# Patient Record
Sex: FEMALE | Race: WHITE | NOT HISPANIC OR LATINO | Employment: FULL TIME | URBAN - METROPOLITAN AREA
[De-identification: names, ages, dates, MRNs, and addresses within clinical notes are randomized per-mention and may not be internally consistent; named-entity substitution may affect disease eponyms.]

---

## 2017-03-30 ENCOUNTER — HOSPITAL ENCOUNTER (OUTPATIENT)
Dept: LAB | Facility: MEDICAL CENTER | Age: 47
End: 2017-03-30
Attending: FAMILY MEDICINE
Payer: COMMERCIAL

## 2017-03-30 LAB
25(OH)D3 SERPL-MCNC: 11 NG/ML (ref 30–100)
ALBUMIN SERPL BCP-MCNC: 3.9 G/DL (ref 3.2–4.9)
ALBUMIN/GLOB SERPL: 1.3 G/DL
ALP SERPL-CCNC: 73 U/L (ref 30–99)
ALT SERPL-CCNC: 22 U/L (ref 2–50)
ANION GAP SERPL CALC-SCNC: 10 MMOL/L (ref 0–11.9)
AST SERPL-CCNC: 19 U/L (ref 12–45)
BASOPHILS # BLD AUTO: 0.03 K/UL (ref 0–0.12)
BASOPHILS NFR BLD AUTO: 0.3 % (ref 0–1.8)
BILIRUB SERPL-MCNC: 0.6 MG/DL (ref 0.1–1.5)
BUN SERPL-MCNC: 11 MG/DL (ref 8–22)
CALCIUM SERPL-MCNC: 8.9 MG/DL (ref 8.5–10.5)
CHLORIDE SERPL-SCNC: 104 MMOL/L (ref 96–112)
CHOLEST SERPL-MCNC: 186 MG/DL (ref 100–199)
CO2 SERPL-SCNC: 24 MMOL/L (ref 20–33)
CREAT SERPL-MCNC: 0.86 MG/DL (ref 0.5–1.4)
EOSINOPHIL # BLD: 0.01 K/UL (ref 0–0.51)
EOSINOPHIL NFR BLD AUTO: 0.1 % (ref 0–6.9)
ERYTHROCYTE [DISTWIDTH] IN BLOOD BY AUTOMATED COUNT: 40.6 FL (ref 35.9–50)
GLOBULIN SER CALC-MCNC: 3 G/DL (ref 1.9–3.5)
GLUCOSE SERPL-MCNC: 79 MG/DL (ref 65–99)
HCT VFR BLD AUTO: 42.5 % (ref 37–47)
HDLC SERPL-MCNC: 43 MG/DL
HGB BLD-MCNC: 13.6 G/DL (ref 12–16)
IMM GRANULOCYTES # BLD AUTO: 0.04 K/UL (ref 0–0.11)
IMM GRANULOCYTES NFR BLD AUTO: 0.4 % (ref 0–0.9)
LDLC SERPL CALC-MCNC: 111 MG/DL
LYMPHOCYTES # BLD: 3.57 K/UL (ref 1–4.8)
LYMPHOCYTES NFR BLD AUTO: 36.6 % (ref 22–41)
MCH RBC QN AUTO: 26.9 PG (ref 27–33)
MCHC RBC AUTO-ENTMCNC: 32 G/DL (ref 33.6–35)
MCV RBC AUTO: 84.2 FL (ref 81.4–97.8)
MONOCYTES # BLD: 0.5 K/UL (ref 0–0.85)
MONOCYTES NFR BLD AUTO: 5.1 % (ref 0–13.4)
NEUTROPHILS # BLD: 5.6 K/UL (ref 2–7.15)
NEUTROPHILS NFR BLD AUTO: 57.5 % (ref 44–72)
NRBC # BLD AUTO: 0 K/UL
NRBC BLD-RTO: 0 /100 WBC
PLATELET # BLD AUTO: 293 K/UL (ref 164–446)
PMV BLD AUTO: 9.5 FL (ref 9–12.9)
POTASSIUM SERPL-SCNC: 3.9 MMOL/L (ref 3.6–5.5)
PROT SERPL-MCNC: 6.9 G/DL (ref 6–8.2)
RBC # BLD AUTO: 5.05 M/UL (ref 4.2–5.4)
SODIUM SERPL-SCNC: 138 MMOL/L (ref 135–145)
TRIGL SERPL-MCNC: 159 MG/DL (ref 0–149)
TSH SERPL DL<=0.005 MIU/L-ACNC: 1.04 UIU/ML (ref 0.3–3.7)
WBC # BLD AUTO: 9.8 K/UL (ref 4.8–10.8)

## 2017-03-30 PROCEDURE — 80061 LIPID PANEL: CPT

## 2017-03-30 PROCEDURE — 85025 COMPLETE CBC W/AUTO DIFF WBC: CPT

## 2017-03-30 PROCEDURE — 82306 VITAMIN D 25 HYDROXY: CPT

## 2017-03-30 PROCEDURE — 36415 COLL VENOUS BLD VENIPUNCTURE: CPT

## 2017-03-30 PROCEDURE — 84443 ASSAY THYROID STIM HORMONE: CPT

## 2017-03-30 PROCEDURE — 80053 COMPREHEN METABOLIC PANEL: CPT

## 2017-04-03 ENCOUNTER — HOSPITAL ENCOUNTER (OUTPATIENT)
Dept: RADIOLOGY | Facility: MEDICAL CENTER | Age: 47
End: 2017-04-03
Attending: FAMILY MEDICINE
Payer: COMMERCIAL

## 2017-04-03 DIAGNOSIS — Z12.39 SCREENING BREAST EXAMINATION: ICD-10-CM

## 2017-04-03 PROCEDURE — 77063 BREAST TOMOSYNTHESIS BI: CPT

## 2017-04-05 ENCOUNTER — HOSPITAL ENCOUNTER (OUTPATIENT)
Dept: RADIOLOGY | Facility: MEDICAL CENTER | Age: 47
End: 2017-04-05

## 2017-04-18 ENCOUNTER — HOSPITAL ENCOUNTER (OUTPATIENT)
Dept: RADIOLOGY | Facility: MEDICAL CENTER | Age: 47
End: 2017-04-18

## 2017-11-26 ENCOUNTER — APPOINTMENT (OUTPATIENT)
Dept: RADIOLOGY | Facility: MEDICAL CENTER | Age: 47
End: 2017-11-26
Attending: EMERGENCY MEDICINE
Payer: COMMERCIAL

## 2017-11-26 ENCOUNTER — HOSPITAL ENCOUNTER (EMERGENCY)
Facility: MEDICAL CENTER | Age: 47
End: 2017-11-26
Attending: EMERGENCY MEDICINE
Payer: COMMERCIAL

## 2017-11-26 VITALS
TEMPERATURE: 96.6 F | WEIGHT: 241.18 LBS | HEART RATE: 61 BPM | RESPIRATION RATE: 16 BRPM | OXYGEN SATURATION: 97 % | SYSTOLIC BLOOD PRESSURE: 115 MMHG | BODY MASS INDEX: 40.18 KG/M2 | HEIGHT: 65 IN | DIASTOLIC BLOOD PRESSURE: 60 MMHG

## 2017-11-26 DIAGNOSIS — S09.90XA CLOSED HEAD INJURY, INITIAL ENCOUNTER: ICD-10-CM

## 2017-11-26 PROCEDURE — 70450 CT HEAD/BRAIN W/O DYE: CPT

## 2017-11-26 PROCEDURE — 90471 IMMUNIZATION ADMIN: CPT

## 2017-11-26 PROCEDURE — 90715 TDAP VACCINE 7 YRS/> IM: CPT | Performed by: EMERGENCY MEDICINE

## 2017-11-26 PROCEDURE — A9270 NON-COVERED ITEM OR SERVICE: HCPCS | Performed by: EMERGENCY MEDICINE

## 2017-11-26 PROCEDURE — 700102 HCHG RX REV CODE 250 W/ 637 OVERRIDE(OP): Performed by: EMERGENCY MEDICINE

## 2017-11-26 PROCEDURE — 304217 HCHG IRRIGATION SYSTEM

## 2017-11-26 PROCEDURE — 304999 HCHG REPAIR-SIMPLE/INTERMED LEVEL 1

## 2017-11-26 PROCEDURE — 72125 CT NECK SPINE W/O DYE: CPT

## 2017-11-26 PROCEDURE — 305308 HCHG STAPLER,SKIN,DISP.

## 2017-11-26 PROCEDURE — 700111 HCHG RX REV CODE 636 W/ 250 OVERRIDE (IP): Performed by: EMERGENCY MEDICINE

## 2017-11-26 PROCEDURE — 99283 EMERGENCY DEPT VISIT LOW MDM: CPT

## 2017-11-26 RX ORDER — ONDANSETRON 4 MG/1
4 TABLET, ORALLY DISINTEGRATING ORAL ONCE
Status: COMPLETED | OUTPATIENT
Start: 2017-11-26 | End: 2017-11-26

## 2017-11-26 RX ORDER — FLUOXETINE HYDROCHLORIDE 40 MG/1
40 CAPSULE ORAL DAILY
Status: SHIPPED | COMMUNITY
End: 2018-12-26

## 2017-11-26 RX ORDER — HYDROCODONE BITARTRATE AND ACETAMINOPHEN 5; 325 MG/1; MG/1
1 TABLET ORAL ONCE
Status: COMPLETED | OUTPATIENT
Start: 2017-11-26 | End: 2017-11-26

## 2017-11-26 RX ORDER — ONDANSETRON 4 MG/1
4 TABLET, ORALLY DISINTEGRATING ORAL EVERY 8 HOURS PRN
Qty: 20 TAB | Refills: 0 | Status: SHIPPED | OUTPATIENT
Start: 2017-11-26 | End: 2018-12-18

## 2017-11-26 RX ORDER — HYDROCODONE BITARTRATE AND ACETAMINOPHEN 5; 325 MG/1; MG/1
1-2 TABLET ORAL EVERY 6 HOURS PRN
Qty: 5 TAB | Refills: 0 | Status: SHIPPED | OUTPATIENT
Start: 2017-11-26 | End: 2018-12-18

## 2017-11-26 RX ADMIN — HYDROCODONE BITARTRATE AND ACETAMINOPHEN 1 TABLET: 5; 325 TABLET ORAL at 13:58

## 2017-11-26 RX ADMIN — CLOSTRIDIUM TETANI TOXOID ANTIGEN (FORMALDEHYDE INACTIVATED), CORYNEBACTERIUM DIPHTHERIAE TOXOID ANTIGEN (FORMALDEHYDE INACTIVATED), BORDETELLA PERTUSSIS TOXOID ANTIGEN (GLUTARALDEHYDE INACTIVATED), BORDETELLA PERTUSSIS FILAMENTOUS HEMAGGLUTININ ANTIGEN (FORMALDEHYDE INACTIVATED), BORDETELLA PERTUSSIS PERTACTIN ANTIGEN, AND BORDETELLA PERTUSSIS FIMBRIAE 2/3 ANTIGEN 0.5 ML: 5; 2; 2.5; 5; 3; 5 INJECTION, SUSPENSION INTRAMUSCULAR at 13:47

## 2017-11-26 RX ADMIN — ONDANSETRON 4 MG: 4 TABLET, ORALLY DISINTEGRATING ORAL at 13:47

## 2017-11-26 ASSESSMENT — PAIN SCALES - GENERAL: PAINLEVEL_OUTOF10: 4

## 2017-11-26 NOTE — ED NOTES
Rolled out of bed and hit head on granite corner of  Nightstand. Laceration to scalp. Complaint of nausea. Denies LOC. States took advil PTA.

## 2017-11-26 NOTE — ED PROVIDER NOTES
ED Provider Note    CHIEF COMPLAINT  Chief Complaint   Patient presents with   • Scalp Laceration   • Nausea       HPI  Gladis Tovar is a 47 y.o. female who presentsTo the ED with head injury. The patient rolled out of her bed and hit her head on the night table. This happened approximate 4:00 in the morning, sharp severe pain to the head, the patient's been having a headache, has been taking ibuprofen for it, the patient is having nausea but no vomiting. No numbness, tingling, weakness. The patient is having some neck pain, no chest pain, shortness of breath, abdominal pains, nausea vomiting.    REVIEW OF SYSTEMS  See HPI for further details. All other systems are negative.     PAST MEDICAL HISTORY  History reviewed. No pertinent past medical history.    FAMILY HISTORY  History reviewed. No pertinent family history.    SOCIAL HISTORY  Social History     Social History   • Marital status:      Spouse name: N/A   • Number of children: N/A   • Years of education: N/A     Social History Main Topics   • Smoking status: Never Smoker   • Smokeless tobacco: Never Used   • Alcohol use Yes      Comment: occ   • Drug use: No   • Sexual activity: Not on file     Other Topics Concern   • Not on file     Social History Narrative   • No narrative on file       SURGICAL HISTORY  Past Surgical History:   Procedure Laterality Date   • APPENDECTOMY  1994   • BREAST RECONSTRUCTION      REDUCTION 1991   • CERVICAL CERCLAGE     • SHOULDER SURGERY Bilateral    • TONSILLECTOMY         CURRENT MEDICATIONS  Home Medications     Reviewed by Samira Senior (Pharmacy Tech) on 11/26/17 at 1344  Med List Status: Complete   Medication Last Dose Status   fluoxetine (PROZAC) 40 MG capsule 11/26/2017 Active   Levonorgest-Eth Estrad 91-Day (SEASONIQUE PO) 11/26/2017 Active                ALLERGIES  Allergies   Allergen Reactions   • Codeine Unspecified     Heart stops  RXN=age 3       PHYSICAL EXAM  VITAL SIGNS: /60   Pulse 61    "Temp 35.9 °C (96.6 °F)   Resp 16   Ht 1.651 m (5' 5\")   Wt 109.4 kg (241 lb 2.9 oz)   SpO2 97%   BMI 40.13 kg/m²   Constitutional:  Well developed, Well nourished,Moderate distress, Non-toxic appearance.   HENT: To have centimeter laceration on the occipital area, no cephalohematoma,  Eyes: PERRLA, EOMI, Conjunctiva normal, No discharge.   Neck: Mild paraspinal tenderness to palpation in the midline C-spine tenderness to palpation, no step-offs  Lymphatic: No lymphadenopathy noted.   Cardiovascular: Normal heart rate, Normal rhythm.   Thorax & Lungs: Normal breath sounds, No respiratory distress, No wheezing, No chest tenderness.   Skin: Warm, Dry, No erythema, No rash.   Extremities: Intact distal pulses, No edema, No tenderness.   Neurologic: Alert & oriented x 3, Normal motor function, Normal sensory function, No focal deficits noted.   Psychiatric: Affect normal, Judgment normal, Mood normal.     RADIOLOGY/PROCEDURES  CT-CSPINE WITHOUT PLUS RECONS   Final Result         1. No acute fracture from C1 through T1 is visualized.         CT-HEAD W/O   Final Result         1. No acute intracranial abnormality. No evidence of acute intracranial hemorrhage or mass lesion.                     Laceration Repair Procedure Note    Indication: Laceration    Procedure: The patient was placed in the appropriate position and anesthesia around the laceration was obtained by infiltration using 1% Lidocaine with epinephrine. The area was then irrigated with high pressure normal saline. The laceration was closed with staples. There were no additional lacerations requiring repair. The wound area was then dressed   Total repaired wound length: 2.5 cm.     Other Items: Staple count: 3    The patient tolerated the procedure well.    Complications: None          COURSE & MEDICAL DECISION MAKING  Pertinent Labs & Imaging studies reviewed. (See chart for details)  Patient with head injury, given the patient's persistent headache and " nausea get a CT scan also the head and the C-spine, these were negative. I stapled the patient's laceration, will discharge patient home on a few pain pills, Zofran, and she understands the risks of narcotics, though of the patient return with worsening symptoms.    FINAL IMPRESSION  1. Closed head injury, initial encounter        Patient referred to primary care provider for blood pressure management     This dictation was created using voice recognition software. The accuracy of the dictation is limited to the abilities of the software. I expect there may be some errors of grammar and possibly content. The nursing notes were reviewed and certain aspects of this information were incorporated into this note.    Electronically signed by: Lobo Arechiga, 11/26/2017 1:05 PM

## 2017-11-26 NOTE — DISCHARGE INSTRUCTIONS
Please follow-up with your primary care provider for blood pressure management.        Head Injury, Adult  You have a head injury. Headaches and throwing up (vomiting) are common after a head injury. It should be easy to wake up from sleeping. Sometimes you must stay in the hospital. Most problems happen within the first 24 hours. Side effects may occur up to 7-10 days after the injury.   WHAT ARE THE TYPES OF HEAD INJURIES?  Head injuries can be as minor as a bump. Some head injuries can be more severe. More severe head injuries include:  · A jarring injury to the brain (concussion).  · A bruise of the brain (contusion). This mean there is bleeding in the brain that can cause swelling.  · A cracked skull (skull fracture).  · Bleeding in the brain that collects, clots, and forms a bump (hematoma).  WHEN SHOULD I GET HELP RIGHT AWAY?   · You are confused or sleepy.  · You cannot be woken up.  · You feel sick to your stomach (nauseous) or keep throwing up (vomiting).  · Your dizziness or unsteadiness is getting worse.  · You have very bad, lasting headaches that are not helped by medicine. Take medicines only as told by your doctor.  · You cannot use your arms or legs like normal.  · You cannot walk.  · You notice changes in the black spots in the center of the colored part of your eye (pupil).  · You have clear or bloody fluid coming from your nose or ears.  · You have trouble seeing.  During the next 24 hours after the injury, you must stay with someone who can watch you. This person should get help right away (call 911 in the [place] if you start to shake and are not able to control it (have seizures), you pass out, or you are unable to wake up.  HOW CAN I PREVENT A HEAD INJURY IN THE FUTURE?  · Wear seat belts.  · Wear a helmet while bike riding and playing sports like football.  · Stay away from dangerous activities around the house.  WHEN CAN I RETURN TO NORMAL ACTIVITIES AND ATHLETICS?  See your doctor before  doing these activities. You should not do normal activities or play contact sports until 1 week after the following symptoms have stopped:  · Headache that does not go away.  · Dizziness.  · Poor attention.  · Confusion.  · Memory problems.  · Sickness to your stomach or throwing up.  · Tiredness.  · Fussiness.  · Bothered by bright lights or loud noises.  · Anxiousness or depression.  · Restless sleep.  MAKE SURE YOU:   · Understand these instructions.  · Will watch your condition.  · Will get help right away if you are not doing well or get worse.     This information is not intended to replace advice given to you by your health care provider. Make sure you discuss any questions you have with your health care provider.     Document Released: 11/30/2009 Document Revised: 01/08/2016 Document Reviewed: 08/25/2014  [employer] Interactive Patient Education ©2016 [employer]

## 2018-03-12 ENCOUNTER — OFFICE VISIT (OUTPATIENT)
Dept: URGENT CARE | Facility: CLINIC | Age: 48
End: 2018-03-12
Payer: COMMERCIAL

## 2018-03-12 VITALS
TEMPERATURE: 97.3 F | WEIGHT: 238 LBS | HEIGHT: 65 IN | RESPIRATION RATE: 18 BRPM | OXYGEN SATURATION: 96 % | BODY MASS INDEX: 39.65 KG/M2 | HEART RATE: 92 BPM | DIASTOLIC BLOOD PRESSURE: 72 MMHG | SYSTOLIC BLOOD PRESSURE: 110 MMHG

## 2018-03-12 DIAGNOSIS — E66.9 OBESITY (BMI 30-39.9): ICD-10-CM

## 2018-03-12 DIAGNOSIS — J01.00 ACUTE NON-RECURRENT MAXILLARY SINUSITIS: ICD-10-CM

## 2018-03-12 PROCEDURE — 99203 OFFICE O/P NEW LOW 30 MIN: CPT | Performed by: PHYSICIAN ASSISTANT

## 2018-03-12 RX ORDER — AMOXICILLIN AND CLAVULANATE POTASSIUM 875; 125 MG/1; MG/1
1 TABLET, FILM COATED ORAL 2 TIMES DAILY
Qty: 14 TAB | Refills: 0 | Status: SHIPPED | OUTPATIENT
Start: 2018-03-12 | End: 2018-03-19

## 2018-03-12 ASSESSMENT — ENCOUNTER SYMPTOMS
HEADACHES: 1
EYE DISCHARGE: 0
EYE REDNESS: 0
PALPITATIONS: 0
FEVER: 0
SINUS PAIN: 1
CHILLS: 0
SHORTNESS OF BREATH: 0
SORE THROAT: 1
EYE PAIN: 0
DIZZINESS: 1
COUGH: 0
WHEEZING: 0
SINUS PRESSURE: 1

## 2018-03-12 ASSESSMENT — PATIENT HEALTH QUESTIONNAIRE - PHQ9: CLINICAL INTERPRETATION OF PHQ2 SCORE: 0

## 2018-03-12 NOTE — PROGRESS NOTES
Subjective:      Gladis Tovar is a 47 y.o. female who presents with Sinus Problem (Almost a week stuffy nose , sunus pressure , dry cough )            Sinus Problem   This is a new problem. The current episode started 1 to 4 weeks ago. The problem is unchanged. There has been no fever. The pain is moderate. Associated symptoms include congestion, ear pain, headaches, sinus pressure and a sore throat. Pertinent negatives include no chills, coughing or shortness of breath. Past treatments include oral decongestants. The treatment provided no relief.       Review of Systems   Constitutional: Positive for malaise/fatigue. Negative for chills and fever.   HENT: Positive for congestion, ear pain, sinus pain, sinus pressure and sore throat.    Eyes: Negative for pain, discharge and redness.   Respiratory: Negative for cough, shortness of breath and wheezing.    Cardiovascular: Negative for chest pain and palpitations.   Neurological: Positive for dizziness and headaches.   All other systems reviewed and are negative.    PMH:  has no past medical history on file.  MEDS:   Current Outpatient Prescriptions:   •  amoxicillin-clavulanate (AUGMENTIN) 875-125 MG Tab, Take 1 Tab by mouth 2 times a day for 7 days., Disp: 14 Tab, Rfl: 0  •  Levonorgest-Eth Estrad 91-Day (SEASONIQUE PO), Take 1 Tab by mouth every day., Disp: , Rfl:   •  fluoxetine (PROZAC) 40 MG capsule, Take 40 mg by mouth every day., Disp: , Rfl:   •  hydrocodone-acetaminophen (NORCO) 5-325 MG Tab per tablet, Take 1-2 Tabs by mouth every 6 hours as needed., Disp: 5 Tab, Rfl: 0  •  ondansetron (ZOFRAN ODT) 4 MG TABLET DISPERSIBLE, Take 1 Tab by mouth every 8 hours as needed., Disp: 20 Tab, Rfl: 0  ALLERGIES:   Allergies   Allergen Reactions   • Codeine Unspecified     Heart stops  RXN=age 3     SURGHX:   Past Surgical History:   Procedure Laterality Date   • APPENDECTOMY  1994   • BREAST RECONSTRUCTION      REDUCTION 1991   • CERVICAL CERCLAGE     • SHOULDER SURGERY  "Bilateral    • TONSILLECTOMY       SOCHX:  reports that she has never smoked. She has never used smokeless tobacco. She reports that she drinks alcohol. She reports that she does not use drugs.  FH: Family history was reviewed, no pertinent findings to report  Medications, Allergies, and current problem list reviewed today in Epic       Objective:     /72   Pulse 92   Temp 36.3 °C (97.3 °F)   Resp 18   Ht 1.651 m (5' 5\")   Wt 108 kg (238 lb)   SpO2 96%   BMI 39.61 kg/m²      Physical Exam   Constitutional: She is oriented to person, place, and time. Vital signs are normal. She appears well-developed and well-nourished.   HENT:   Head: Normocephalic and atraumatic.   Right Ear: Hearing, tympanic membrane, external ear and ear canal normal.   Left Ear: Hearing, tympanic membrane, external ear and ear canal normal.   Nose: Mucosal edema and rhinorrhea present. No sinus tenderness. No epistaxis. Right sinus exhibits maxillary sinus tenderness. Left sinus exhibits maxillary sinus tenderness.   Mouth/Throat: Uvula is midline, oropharynx is clear and moist and mucous membranes are normal.   Neck: Normal range of motion. Neck supple.   Cardiovascular: Normal rate, regular rhythm, normal heart sounds and intact distal pulses.    Pulmonary/Chest: Effort normal and breath sounds normal.   Neurological: She is alert and oriented to person, place, and time.   Skin: Skin is warm and dry.   Psychiatric: She has a normal mood and affect. Her behavior is normal.   Vitals reviewed.              Assessment/Plan:     1. Acute non-recurrent maxillary sinusitis    - amoxicillin-clavulanate (AUGMENTIN) 875-125 MG Tab; Take 1 Tab by mouth 2 times a day for 7 days.  Dispense: 14 Tab; Refill: 0    2. Obesity (BMI 30-39.9)    - Patient identified as having weight management issue.  Appropriate orders and counseling given.    Differential diagnosis, natural history, supportive care discussed. Follow-up with primary care provider " within 7-10 days, emergency room precautions discussed.  Patient and/or family appears understanding of information.

## 2018-03-16 ENCOUNTER — OFFICE VISIT (OUTPATIENT)
Dept: URGENT CARE | Facility: CLINIC | Age: 48
End: 2018-03-16
Payer: COMMERCIAL

## 2018-03-16 VITALS
OXYGEN SATURATION: 97 % | HEIGHT: 65 IN | DIASTOLIC BLOOD PRESSURE: 80 MMHG | TEMPERATURE: 97.6 F | HEART RATE: 100 BPM | WEIGHT: 238 LBS | SYSTOLIC BLOOD PRESSURE: 124 MMHG | RESPIRATION RATE: 20 BRPM | BODY MASS INDEX: 39.65 KG/M2

## 2018-03-16 DIAGNOSIS — J06.9 URI WITH COUGH AND CONGESTION: ICD-10-CM

## 2018-03-16 DIAGNOSIS — J40 BRONCHITIS: Primary | ICD-10-CM

## 2018-03-16 DIAGNOSIS — J01.40 ACUTE NON-RECURRENT PANSINUSITIS: ICD-10-CM

## 2018-03-16 PROCEDURE — 99214 OFFICE O/P EST MOD 30 MIN: CPT | Performed by: PHYSICIAN ASSISTANT

## 2018-03-16 RX ORDER — BENZONATATE 200 MG/1
200 CAPSULE ORAL 3 TIMES DAILY PRN
Qty: 60 CAP | Refills: 0 | Status: SHIPPED | OUTPATIENT
Start: 2018-03-16 | End: 2018-12-18

## 2018-03-16 RX ORDER — METHYLPREDNISOLONE 4 MG/1
TABLET ORAL
Qty: 21 TAB | Refills: 0 | Status: SHIPPED | OUTPATIENT
Start: 2018-03-16 | End: 2018-12-18

## 2018-03-16 RX ORDER — DOXYCYCLINE HYCLATE 100 MG
100 TABLET ORAL 2 TIMES DAILY
Qty: 14 TAB | Refills: 0 | Status: SHIPPED | OUTPATIENT
Start: 2018-03-16 | End: 2018-03-23

## 2018-03-16 NOTE — PATIENT INSTRUCTIONS
Acute Bronchitis, Adult  Acute bronchitis is when air tubes (bronchi) in the lungs suddenly get swollen. The condition can make it hard to breathe. It can also cause these symptoms:  · A cough.  · Coughing up clear, yellow, or green mucus.  · Wheezing.  · Chest congestion.  · Shortness of breath.  · A fever.  · Body aches.  · Chills.  · A sore throat.  Follow these instructions at home:  Medicines  · Take over-the-counter and prescription medicines only as told by your doctor.  · If you were prescribed an antibiotic medicine, take it as told by your doctor. Do not stop taking the antibiotic even if you start to feel better.  General instructions  · Rest.  · Drink enough fluids to keep your pee (urine) clear or pale yellow.  · Avoid smoking and secondhand smoke. If you smoke and you need help quitting, ask your doctor. Quitting will help your lungs heal faster.  · Use an inhaler, cool mist vaporizer, or humidifier as told by your doctor.  · Keep all follow-up visits as told by your doctor. This is important.  How is this prevented?  To lower your risk of getting this condition again:  · Wash your hands often with soap and water. If you cannot use soap and water, use hand .  · Avoid contact with people who have cold symptoms.  · Try not to touch your hands to your mouth, nose, or eyes.  · Make sure to get the flu shot every year.  Contact a doctor if:  · Your symptoms do not get better in 2 weeks.  Get help right away if:  · You cough up blood.  · You have chest pain.  · You have very bad shortness of breath.  · You become dehydrated.  · You faint (pass out) or keep feeling like you are going to pass out.  · You keep throwing up (vomiting).  · You have a very bad headache.  · Your fever or chills gets worse.  This information is not intended to replace advice given to you by your health care provider. Make sure you discuss any questions you have with your health care provider.  Document Released: 06/05/2009  Document Revised: 07/26/2017 Document Reviewed: 06/07/2017  [employer] Interactive Patient Education © 2017 [employer]

## 2018-03-16 NOTE — PROGRESS NOTES
Subjective:      Pt is a 47 y.o. female who presents with Nasal Congestion and Cough            HPI  PT presents to  clinic today complaining of sore throat, pressure in ears, cough, fatigue, runny nose, wheezing and SOB. PT denies CP, NVD, abdominal pain, joint pain. PT states these symptoms began around 7 days ago. PT states the pain is a 7/10 with coughing fits, aching in nature and worse at night.  Pt has not taken any RX medications for this condition. The pt's medication list, problem list, and allergies have been evaluated and reviewed during today's visit.    PMH:  Negative per pt.      PSH:  Past Surgical History:   Procedure Laterality Date   • APPENDECTOMY  1994   • BREAST RECONSTRUCTION      REDUCTION 1991   • CERVICAL CERCLAGE     • SHOULDER SURGERY Bilateral    • TONSILLECTOMY         Fam Hx:  the patient's family history is not pertinent to their current complaint      Soc HX:  Social History     Social History   • Marital status:      Spouse name: N/A   • Number of children: N/A   • Years of education: N/A     Occupational History   • Not on file.     Social History Main Topics   • Smoking status: Never Smoker   • Smokeless tobacco: Never Used   • Alcohol use Yes      Comment: occ   • Drug use: No   • Sexual activity: Not on file     Other Topics Concern   • Not on file     Social History Narrative   • No narrative on file         Medications:    Current Outpatient Prescriptions:   •  benzonatate (TESSALON) 200 MG capsule, Take 1 Cap by mouth 3 times a day as needed., Disp: 60 Cap, Rfl: 0  •  doxycycline (VIBRAMYCIN) 100 MG Tab, Take 1 Tab by mouth 2 times a day for 7 days., Disp: 14 Tab, Rfl: 0  •  MethylPREDNISolone (MEDROL DOSEPAK) 4 MG Tablet Therapy Pack, Use as directed, Disp: 21 Tab, Rfl: 0  •  amoxicillin-clavulanate (AUGMENTIN) 875-125 MG Tab, Take 1 Tab by mouth 2 times a day for 7 days., Disp: 14 Tab, Rfl: 0  •  Levonorgest-Eth Estrad 91-Day (SEASONIQUE PO), Take 1 Tab by mouth  "every day., Disp: , Rfl:   •  fluoxetine (PROZAC) 40 MG capsule, Take 40 mg by mouth every day., Disp: , Rfl:   •  hydrocodone-acetaminophen (NORCO) 5-325 MG Tab per tablet, Take 1-2 Tabs by mouth every 6 hours as needed., Disp: 5 Tab, Rfl: 0  •  ondansetron (ZOFRAN ODT) 4 MG TABLET DISPERSIBLE, Take 1 Tab by mouth every 8 hours as needed., Disp: 20 Tab, Rfl: 0      Allergies:  Codeine    ROS  Review of Systems   Constitutional: Positive for malaise/fatigue. Negative for fever and diaphoresis.   HENT: Positive for congestion and sore throat. Negative for ear discharge, hearing loss, nosebleeds and tinnitus.    Eyes: Negative for blurred vision, double vision and photophobia.   Respiratory: Positive for cough, sputum production, shortness of breath and wheezing. Negative for hemoptysis.    Cardiovascular: Negative for chest pain and palpitations.   Gastrointestinal: Negative for nausea, vomiting, abdominal pain, diarrhea and constipation.   Genitourinary: Negative for dysuria and flank pain.   Musculoskeletal: Negative for joint pain and myalgias.   Skin: Negative for itching and rash.   Neurological: Positive for headaches. Negative for dizziness, tingling and weakness.   Endo/Heme/Allergies: Does not bruise/bleed easily.   Psychiatric/Behavioral: Negative for depression. The patient is not nervous/anxious.           Objective:     /80   Pulse 100   Temp 36.4 °C (97.6 °F)   Resp 20   Ht 1.651 m (5' 5\")   Wt 108 kg (238 lb)   SpO2 97%   BMI 39.61 kg/m²      Physical Exam      Physical Exam   Constitutional: PT is oriented to person, place, and time. PT appears well-developed and well-nourished. No distress.   HENT:   Head: Normocephalic and atraumatic.   Right Ear: Hearing, tympanic membrane, external ear and ear canal normal.   Left Ear: Hearing, tympanic membrane, external ear and ear canal normal.   Nose: Mucosal edema, rhinorrhea and sinus tenderness present. Right sinus exhibits frontal sinus " tenderness. Left sinus exhibits frontal sinus tenderness.   Mouth/Throat: Uvula is midline. Mucous membranes are pale. Posterior oropharyngeal edema and posterior oropharyngeal erythema present. No oropharyngeal exudate.   Eyes: Conjunctivae normal and EOM are normal. Pupils are equal, round, and reactive to light. Right eye exhibits no discharge. Left eye exhibits no discharge.   Neck: Normal range of motion. Neck supple. No thyromegaly present.   Cardiovascular: Normal rate, regular rhythm, normal heart sounds and intact distal pulses.  Exam reveals no gallop and no friction rub.    No murmur heard.  Pulmonary/Chest: Effort normal. No respiratory distress. PT has wheezes. PT has no rales. PT exhibits tenderness.   Abdominal: Soft. Bowel sounds are normal. PT exhibits no distension and no mass. There is no tenderness. There is no rebound and no guarding.   Musculoskeletal: Normal range of motion. PT exhibits no edema and no tenderness.   Lymphadenopathy:     PT has no cervical adenopathy.   Neurological: Pt is alert and oriented to person, place, and time. Pt has normal reflexes. No cranial nerve deficit.   Skin: Skin is warm and dry. No rash noted. No erythema.   Psychiatric: PT has a normal mood and affect. Pt behavior is normal. Judgment and thought content normal.          Assessment/Plan:     1. Bronchitis    - doxycycline (VIBRAMYCIN) 100 MG Tab; Take 1 Tab by mouth 2 times a day for 7 days.  Dispense: 14 Tab; Refill: 0  - MethylPREDNISolone (MEDROL DOSEPAK) 4 MG Tablet Therapy Pack; Use as directed  Dispense: 21 Tab; Refill: 0    2. URI with cough and congestion    - benzonatate (TESSALON) 200 MG capsule; Take 1 Cap by mouth 3 times a day as needed.  Dispense: 60 Cap; Refill: 0  - MethylPREDNISolone (MEDROL DOSEPAK) 4 MG Tablet Therapy Pack; Use as directed  Dispense: 21 Tab; Refill: 0    3. Acute non-recurrent pansinusitis    Rest, fluids encouraged.  OTC decongestant for congestion/cough  AVS with medical  info given.  Pt was in full understanding and agreement with the plan.  Follow-up as needed if symptoms worsen or fail to improve.

## 2018-04-16 ENCOUNTER — OFFICE VISIT (OUTPATIENT)
Dept: URGENT CARE | Facility: CLINIC | Age: 48
End: 2018-04-16
Payer: COMMERCIAL

## 2018-04-16 ENCOUNTER — HOSPITAL ENCOUNTER (OUTPATIENT)
Facility: MEDICAL CENTER | Age: 48
End: 2018-04-16
Attending: PHYSICIAN ASSISTANT
Payer: COMMERCIAL

## 2018-04-16 VITALS
TEMPERATURE: 98.9 F | DIASTOLIC BLOOD PRESSURE: 82 MMHG | OXYGEN SATURATION: 96 % | SYSTOLIC BLOOD PRESSURE: 130 MMHG | WEIGHT: 242 LBS | HEART RATE: 82 BPM | RESPIRATION RATE: 14 BRPM | BODY MASS INDEX: 40.32 KG/M2 | HEIGHT: 65 IN

## 2018-04-16 DIAGNOSIS — N30.00 ACUTE CYSTITIS WITHOUT HEMATURIA: ICD-10-CM

## 2018-04-16 LAB
APPEARANCE UR: NORMAL
BILIRUB UR STRIP-MCNC: NORMAL MG/DL
COLOR UR AUTO: YELLOW
GLUCOSE UR STRIP.AUTO-MCNC: NORMAL MG/DL
KETONES UR STRIP.AUTO-MCNC: NORMAL MG/DL
LEUKOCYTE ESTERASE UR QL STRIP.AUTO: NORMAL
NITRITE UR QL STRIP.AUTO: NORMAL
PH UR STRIP.AUTO: 7 [PH] (ref 5–8)
PROT UR QL STRIP: NORMAL MG/DL
RBC UR QL AUTO: NORMAL
SP GR UR STRIP.AUTO: 1.01
UROBILINOGEN UR STRIP-MCNC: NORMAL MG/DL

## 2018-04-16 PROCEDURE — 99000 SPECIMEN HANDLING OFFICE-LAB: CPT | Performed by: PHYSICIAN ASSISTANT

## 2018-04-16 PROCEDURE — 81002 URINALYSIS NONAUTO W/O SCOPE: CPT | Performed by: PHYSICIAN ASSISTANT

## 2018-04-16 PROCEDURE — 87086 URINE CULTURE/COLONY COUNT: CPT

## 2018-04-16 PROCEDURE — 87077 CULTURE AEROBIC IDENTIFY: CPT

## 2018-04-16 PROCEDURE — 99214 OFFICE O/P EST MOD 30 MIN: CPT | Performed by: PHYSICIAN ASSISTANT

## 2018-04-16 RX ORDER — NITROFURANTOIN 25; 75 MG/1; MG/1
100 CAPSULE ORAL EVERY 12 HOURS
Qty: 10 CAP | Refills: 0 | Status: SHIPPED | OUTPATIENT
Start: 2018-04-16 | End: 2018-04-21

## 2018-04-17 DIAGNOSIS — N30.00 ACUTE CYSTITIS WITHOUT HEMATURIA: ICD-10-CM

## 2018-04-18 ASSESSMENT — ENCOUNTER SYMPTOMS
BACK PAIN: 0
FEVER: 0
SHORTNESS OF BREATH: 0
PALPITATIONS: 0
FLANK PAIN: 0
COUGH: 0
CHILLS: 0

## 2018-04-18 NOTE — PROGRESS NOTES
Subjective:      Gldais Tovar is a 47 y.o. female who presents with UTI            UTI   This is a new problem. The current episode started in the past 7 days. The problem occurs constantly. The problem has been unchanged. Associated symptoms include urinary symptoms. Pertinent negatives include no chest pain, chills, coughing or fever. Nothing aggravates the symptoms. She has tried nothing for the symptoms.       Review of Systems   Constitutional: Negative for chills and fever.   Respiratory: Negative for cough and shortness of breath.    Cardiovascular: Negative for chest pain and palpitations.   Genitourinary: Positive for dysuria, frequency and urgency. Negative for flank pain and hematuria.   Musculoskeletal: Negative for back pain.   All other systems reviewed and are negative.    PMH:  has no past medical history on file.  MEDS:   Current Outpatient Prescriptions:   •  nitrofurantoin monohydr macro (MACROBID) 100 MG Cap, Take 1 Cap by mouth every 12 hours for 5 days., Disp: 10 Cap, Rfl: 0  •  Levonorgest-Eth Estrad 91-Day (SEASONIQUE PO), Take 1 Tab by mouth every day., Disp: , Rfl:   •  fluoxetine (PROZAC) 40 MG capsule, Take 40 mg by mouth every day., Disp: , Rfl:   •  benzonatate (TESSALON) 200 MG capsule, Take 1 Cap by mouth 3 times a day as needed., Disp: 60 Cap, Rfl: 0  •  MethylPREDNISolone (MEDROL DOSEPAK) 4 MG Tablet Therapy Pack, Use as directed, Disp: 21 Tab, Rfl: 0  •  hydrocodone-acetaminophen (NORCO) 5-325 MG Tab per tablet, Take 1-2 Tabs by mouth every 6 hours as needed., Disp: 5 Tab, Rfl: 0  •  ondansetron (ZOFRAN ODT) 4 MG TABLET DISPERSIBLE, Take 1 Tab by mouth every 8 hours as needed., Disp: 20 Tab, Rfl: 0  ALLERGIES:   Allergies   Allergen Reactions   • Codeine Unspecified     Heart stops  RXN=age 3     SURGHX:   Past Surgical History:   Procedure Laterality Date   • APPENDECTOMY  1994   • BREAST RECONSTRUCTION      REDUCTION 1991   • CERVICAL CERCLAGE     • SHOULDER SURGERY Bilateral    •  "TONSILLECTOMY       SOCHX:  reports that she has never smoked. She has never used smokeless tobacco. She reports that she drinks alcohol. She reports that she does not use drugs.  FH: Family history was reviewed, no pertinent findings to report  Medications, Allergies, and current problem list reviewed today in Epic       Objective:     /82   Pulse 82   Temp 37.2 °C (98.9 °F)   Resp 14   Ht 1.651 m (5' 5\")   Wt 109.8 kg (242 lb)   SpO2 96%   BMI 40.27 kg/m²      Physical Exam   Constitutional: She is oriented to person, place, and time. She appears well-developed and well-nourished.   Neck: Normal range of motion. Neck supple.   Cardiovascular: Normal rate, regular rhythm and normal heart sounds.    Pulmonary/Chest: Effort normal and breath sounds normal.   Musculoskeletal: She exhibits no tenderness.   No CVA tenderness   Neurological: She is alert and oriented to person, place, and time.   Skin: Skin is warm and dry.   Psychiatric: She has a normal mood and affect. Her behavior is normal. Judgment and thought content normal.   Vitals reviewed.              Assessment/Plan:     1. Acute cystitis without hematuria    - POCT Urinalysis  - Urine Culture; Future  - nitrofurantoin monohydr macro (MACROBID) 100 MG Cap; Take 1 Cap by mouth every 12 hours for 5 days.  Dispense: 10 Cap; Refill: 0    Differential diagnosis, natural history, supportive care discussed. Follow-up with primary care provider within 7-10 days, emergency room precautions discussed.  Patient and/or family appears understanding of information.    "

## 2018-04-19 LAB
BACTERIA UR CULT: ABNORMAL
BACTERIA UR CULT: ABNORMAL
SIGNIFICANT IND 70042: ABNORMAL
SITE SITE: ABNORMAL
SOURCE SOURCE: ABNORMAL

## 2018-05-03 ENCOUNTER — HOSPITAL ENCOUNTER (OUTPATIENT)
Facility: MEDICAL CENTER | Age: 48
End: 2018-05-03
Attending: NURSE PRACTITIONER
Payer: COMMERCIAL

## 2018-05-03 ENCOUNTER — OFFICE VISIT (OUTPATIENT)
Dept: URGENT CARE | Facility: CLINIC | Age: 48
End: 2018-05-03
Payer: COMMERCIAL

## 2018-05-03 VITALS
BODY MASS INDEX: 39.49 KG/M2 | DIASTOLIC BLOOD PRESSURE: 62 MMHG | TEMPERATURE: 98.4 F | HEART RATE: 90 BPM | WEIGHT: 237 LBS | HEIGHT: 65 IN | SYSTOLIC BLOOD PRESSURE: 110 MMHG | RESPIRATION RATE: 16 BRPM | OXYGEN SATURATION: 99 %

## 2018-05-03 DIAGNOSIS — Z86.19 HISTORY OF GROUP B STREPTOCOCCUS (GBS) INFECTION: ICD-10-CM

## 2018-05-03 DIAGNOSIS — R30.0 DYSURIA: ICD-10-CM

## 2018-05-03 DIAGNOSIS — N30.00 ACUTE CYSTITIS WITHOUT HEMATURIA: ICD-10-CM

## 2018-05-03 DIAGNOSIS — Z86.19 HISTORY OF CANDIDIASIS: ICD-10-CM

## 2018-05-03 LAB
APPEARANCE UR: NORMAL
BILIRUB UR STRIP-MCNC: NORMAL MG/DL
COLOR UR AUTO: YELLOW
GLUCOSE UR STRIP.AUTO-MCNC: NORMAL MG/DL
KETONES UR STRIP.AUTO-MCNC: NORMAL MG/DL
LEUKOCYTE ESTERASE UR QL STRIP.AUTO: NORMAL
NITRITE UR QL STRIP.AUTO: NORMAL
PH UR STRIP.AUTO: 7.5 [PH] (ref 5–8)
PROT UR QL STRIP: NORMAL MG/DL
RBC UR QL AUTO: NORMAL
SP GR UR STRIP.AUTO: 1.01
UROBILINOGEN UR STRIP-MCNC: NORMAL MG/DL

## 2018-05-03 PROCEDURE — 81002 URINALYSIS NONAUTO W/O SCOPE: CPT | Performed by: NURSE PRACTITIONER

## 2018-05-03 PROCEDURE — 99213 OFFICE O/P EST LOW 20 MIN: CPT | Performed by: NURSE PRACTITIONER

## 2018-05-03 PROCEDURE — 87077 CULTURE AEROBIC IDENTIFY: CPT

## 2018-05-03 PROCEDURE — 87086 URINE CULTURE/COLONY COUNT: CPT

## 2018-05-03 RX ORDER — FLUCONAZOLE 150 MG/1
150 TABLET ORAL DAILY
Qty: 1 TAB | Refills: 0 | Status: SHIPPED | OUTPATIENT
Start: 2018-05-03 | End: 2018-12-18

## 2018-05-03 RX ORDER — CEPHALEXIN 500 MG/1
1000 CAPSULE ORAL EVERY 6 HOURS
Qty: 42 CAP | Refills: 0 | Status: SHIPPED | OUTPATIENT
Start: 2018-05-03 | End: 2018-05-10

## 2018-05-03 ASSESSMENT — ENCOUNTER SYMPTOMS
DIZZINESS: 0
CHILLS: 0
NAUSEA: 0
MYALGIAS: 0
SORE THROAT: 0
VOMITING: 0
FLANK PAIN: 0
SHORTNESS OF BREATH: 0
EMPTYING BLADDER: 1
FEVER: 0
EYE PAIN: 0

## 2018-05-03 ASSESSMENT — PATIENT HEALTH QUESTIONNAIRE - PHQ9: CLINICAL INTERPRETATION OF PHQ2 SCORE: 0

## 2018-05-03 NOTE — PROGRESS NOTES
Subjective:     Gladis Tovar is a 47 y.o. female who presents for Cystitis (feeling of bloated and pressure lower abd, was taking antibtics for sore throat)       Cystitis    This is a recurrent problem. The current episode started 1 to 4 weeks ago. The problem occurs every urination. The problem has been unchanged. The quality of the pain is described as burning. The pain is at a severity of 3/10. The pain is mild. There has been no fever. There is no history of pyelonephritis. Associated symptoms include urgency. Pertinent negatives include no chills, discharge, flank pain, frequency, hematuria, hesitancy, nausea, possible pregnancy or vomiting. She has tried antibiotics for the symptoms. The treatment provided no relief. There is no history of recurrent UTIs or a single kidney.   No past medical history on file.  Past Surgical History:   Procedure Laterality Date   • APPENDECTOMY  1994   • BREAST RECONSTRUCTION      REDUCTION 1991   • CERVICAL CERCLAGE     • SHOULDER SURGERY Bilateral    • TONSILLECTOMY       Social History     Social History   • Marital status:      Spouse name: N/A   • Number of children: N/A   • Years of education: N/A     Occupational History   • Not on file.     Social History Main Topics   • Smoking status: Never Smoker   • Smokeless tobacco: Never Used   • Alcohol use Yes      Comment: occ   • Drug use: No   • Sexual activity: Not on file     Other Topics Concern   • Not on file     Social History Narrative   • No narrative on file    No family history on file. Review of Systems   Constitutional: Negative for chills and fever.   HENT: Negative for sore throat.    Eyes: Negative for pain.   Respiratory: Negative for shortness of breath.    Cardiovascular: Negative for chest pain.   Gastrointestinal: Negative for nausea and vomiting.   Genitourinary: Positive for dysuria and urgency. Negative for flank pain, frequency, hematuria and hesitancy.   Musculoskeletal: Negative for myalgias.  "  Skin: Negative for rash.   Neurological: Negative for dizziness.     Allergies   Allergen Reactions   • Codeine Unspecified     Heart stops  RXN=age 3      Objective:   /62   Pulse 90   Temp 36.9 °C (98.4 °F)   Resp 16   Ht 1.651 m (5' 5\")   Wt 107.5 kg (237 lb)   SpO2 99%   BMI 39.44 kg/m²   Physical Exam   Constitutional: She is oriented to person, place, and time. She appears well-developed and well-nourished. No distress.   HENT:   Head: Normocephalic and atraumatic.   Eyes: Conjunctivae and EOM are normal. Pupils are equal, round, and reactive to light.   Cardiovascular: Normal rate and regular rhythm.    No murmur heard.  Pulmonary/Chest: Effort normal and breath sounds normal. No respiratory distress.   Abdominal: Soft. She exhibits no distension. There is tenderness in the suprapubic area. There is no CVA tenderness.   Neurological: She is alert and oriented to person, place, and time. She has normal reflexes. No sensory deficit.   Skin: Skin is warm and dry.   Psychiatric: She has a normal mood and affect.         Assessment/Plan:   Assessment    1. Acute cystitis without hematuria  POCT Urinalysis    cephALEXin (KEFLEX) 500 MG Cap    URINE CULTURE(NEW)   2. Dysuria  POCT Urinalysis    cephALEXin (KEFLEX) 500 MG Cap    URINE CULTURE(NEW)   3. History of candidiasis  fluconazole (DIFLUCAN) 150 MG tablet   4. History of group B Streptococcus (GBS) infection     UA positive leuks, RBC patient's previous urine culture positive for strep B. Will place patient on Keflex which will cover provide coverage.  We'll send another urine culture to ensure bacterial infection. Advised increased fluid intake, cranberry pills.    Pt. Was given ABX therapy today and will change therapy if culture indicates this is necessary. ER precautions given- worsening symptoms, back pain, abd. Pain, or fevers.   Pt. Is to increase fluids, and take the complete duration of the therapy.   Pt. Understands and agrees with the " plan.   F/U with PCP in 3-4 days as needed.     Differential diagnosis, natural history, supportive care, and indications for immediate follow-up discussed.

## 2018-05-22 ENCOUNTER — HOSPITAL ENCOUNTER (OUTPATIENT)
Dept: LAB | Facility: MEDICAL CENTER | Age: 48
End: 2018-05-22
Attending: NURSE PRACTITIONER
Payer: COMMERCIAL

## 2018-05-22 PROCEDURE — 87186 SC STD MICRODIL/AGAR DIL: CPT

## 2018-05-22 PROCEDURE — 87086 URINE CULTURE/COLONY COUNT: CPT

## 2018-05-22 PROCEDURE — 87077 CULTURE AEROBIC IDENTIFY: CPT

## 2018-09-19 ENCOUNTER — OFFICE VISIT (OUTPATIENT)
Dept: URGENT CARE | Facility: CLINIC | Age: 48
End: 2018-09-19
Payer: COMMERCIAL

## 2018-09-19 VITALS
RESPIRATION RATE: 20 BRPM | SYSTOLIC BLOOD PRESSURE: 106 MMHG | WEIGHT: 238 LBS | HEART RATE: 92 BPM | OXYGEN SATURATION: 100 % | HEIGHT: 65 IN | DIASTOLIC BLOOD PRESSURE: 72 MMHG | BODY MASS INDEX: 39.65 KG/M2 | TEMPERATURE: 97.1 F

## 2018-09-19 DIAGNOSIS — J06.9 VIRAL URI WITH COUGH: ICD-10-CM

## 2018-09-19 DIAGNOSIS — J32.9 RHINOSINUSITIS: ICD-10-CM

## 2018-09-19 PROCEDURE — 99213 OFFICE O/P EST LOW 20 MIN: CPT | Performed by: NURSE PRACTITIONER

## 2018-09-19 RX ORDER — BENZONATATE 100 MG/1
100 CAPSULE ORAL 3 TIMES DAILY PRN
Qty: 60 CAP | Refills: 0 | Status: SHIPPED | OUTPATIENT
Start: 2018-09-19 | End: 2018-12-18

## 2018-09-19 ASSESSMENT — ENCOUNTER SYMPTOMS
SHORTNESS OF BREATH: 0
SPUTUM PRODUCTION: 1
FEVER: 0
COUGH: 1
WHEEZING: 0

## 2018-09-19 ASSESSMENT — PAIN SCALES - GENERAL: PAINLEVEL: 2=MINIMAL-SLIGHT

## 2018-09-19 ASSESSMENT — COPD QUESTIONNAIRES: COPD: 0

## 2018-09-19 NOTE — PROGRESS NOTES
Subjective:      Gladis Tovar is a 47 y.o. female who presents with Cough (x 5 days ); Nasal Congestion (x 5 days); and Ear Fullness (x 5 days )            Cough   This is a new problem. Episode onset: pt reports onset of cough and sinus congestion 5 days ago. She admits the cough is really bad and keeping her up at night. Denies any recent fever. Coughing up sputum throughout the day. The problem has been unchanged. The cough is productive of sputum. Associated symptoms include nasal congestion and postnasal drip. Pertinent negatives include no ear congestion, ear pain, fever, shortness of breath or wheezing. Associated symptoms comments: She admits she has recently been on a very long course of ABX for a kidney issue and is concerned about taking more ABX. Pt states over the last day her sinuses have been draining more constantly than before. She has tried OTC cough suppressant and rest (OTC cough drops and emergen-C) for the symptoms. The treatment provided mild relief. There is no history of asthma, COPD or pneumonia.       Review of Systems   Constitutional: Positive for malaise/fatigue. Negative for fever.   HENT: Positive for congestion and postnasal drip. Negative for ear pain.    Respiratory: Positive for cough and sputum production. Negative for shortness of breath and wheezing.    All other systems reviewed and are negative.    No past medical history on file.   Past Surgical History:   Procedure Laterality Date   • APPENDECTOMY  1994   • BREAST RECONSTRUCTION      REDUCTION 1991   • CERVICAL CERCLAGE     • SHOULDER SURGERY Bilateral    • TONSILLECTOMY        Social History     Social History   • Marital status:      Spouse name: N/A   • Number of children: N/A   • Years of education: N/A     Occupational History   • Not on file.     Social History Main Topics   • Smoking status: Never Smoker   • Smokeless tobacco: Never Used   • Alcohol use Yes      Comment: occ   • Drug use: No   • Sexual activity:  "Not on file     Other Topics Concern   • Not on file     Social History Narrative   • No narrative on file          Objective:     /72 (BP Location: Right arm, Patient Position: Sitting, BP Cuff Size: Adult)   Pulse 92   Temp 36.2 °C (97.1 °F) (Temporal)   Resp 20   Ht 1.651 m (5' 5\")   Wt 108 kg (238 lb)   SpO2 100%   BMI 39.61 kg/m²      Physical Exam   Constitutional: She is oriented to person, place, and time. Vital signs are normal. She appears well-developed and well-nourished.   HENT:   Head: Normocephalic and atraumatic.   Right Ear: Tympanic membrane and external ear normal.   Left Ear: Tympanic membrane and external ear normal.   Nose: Mucosal edema, rhinorrhea and sinus tenderness present.   Mouth/Throat: Posterior oropharyngeal erythema present.   Eyes: Pupils are equal, round, and reactive to light. EOM are normal.   Neck: Normal range of motion.   Cardiovascular: Normal rate and regular rhythm.    Pulmonary/Chest: Effort normal and breath sounds normal.   Musculoskeletal: Normal range of motion.   Lymphadenopathy:        Head (right side): Submandibular adenopathy present.        Head (left side): Submandibular adenopathy present.   Neurological: She is alert and oriented to person, place, and time.   Skin: Skin is warm and dry. Capillary refill takes less than 2 seconds.   Psychiatric: She has a normal mood and affect. Her speech is normal and behavior is normal. Thought content normal.   Vitals reviewed.              Assessment/Plan:     1. Rhinosinusitis    2. Viral URI with cough  - Hydrocod Polst-CPM Polst ER (TUSSIONEX) 10-8 MG/5ML Suspension Extended Release; Take 5 mL by mouth every 12 hours for 14 days.  Dispense: 140 mL; Refill: 0  - benzonatate (TESSALON) 100 MG Cap; Take 1 Cap by mouth 3 times a day as needed for Cough.  Dispense: 60 Cap; Refill: 0    Advised pt her symptoms appear to be consistent with a virus at this time. I would also like for her to wait and watch her " symptoms to assess for improvement as potentially starting her on another course of ABX could be detrimental I.e. Causing Cdiff and this was discussed with pt.   Increase water intake  Get plenty of rest  Sedating effects of cough syrup discussed. Checked patient's  and find no evidence of narcotic misuse.  Continue OTC immune support therapies and decongestant as directed  Sleep with HOB elevated to help improve cough at [facility]  Supportive care, differential diagnoses, and indications for immediate follow-up discussed with patient.    Pathogenesis of diagnosis discussed including typical length and natural progression.      Instructed to return to  or nearest emergency department if symptoms fail to improve, for any change in condition, further concerns, or new concerning symptoms.  Patient states understanding of the plan of care and discharge instructions.

## 2018-09-25 DIAGNOSIS — B96.89 ACUTE BACTERIAL SINUSITIS: ICD-10-CM

## 2018-09-25 DIAGNOSIS — J01.90 ACUTE BACTERIAL SINUSITIS: ICD-10-CM

## 2018-09-25 DIAGNOSIS — J98.01 BRONCHOSPASM: ICD-10-CM

## 2018-09-25 RX ORDER — AMOXICILLIN AND CLAVULANATE POTASSIUM 875; 125 MG/1; MG/1
1 TABLET, FILM COATED ORAL 2 TIMES DAILY
Qty: 20 TAB | Refills: 0 | Status: SHIPPED | OUTPATIENT
Start: 2018-09-25 | End: 2018-10-05

## 2018-09-25 RX ORDER — METHYLPREDNISOLONE 4 MG/1
4 TABLET ORAL DAILY
Qty: 1 KIT | Refills: 0 | Status: SHIPPED | OUTPATIENT
Start: 2018-09-25 | End: 2018-12-18

## 2018-12-18 ENCOUNTER — HOSPITAL ENCOUNTER (EMERGENCY)
Facility: MEDICAL CENTER | Age: 48
End: 2018-12-18
Attending: EMERGENCY MEDICINE
Payer: COMMERCIAL

## 2018-12-18 ENCOUNTER — APPOINTMENT (OUTPATIENT)
Dept: RADIOLOGY | Facility: MEDICAL CENTER | Age: 48
End: 2018-12-18
Attending: EMERGENCY MEDICINE
Payer: COMMERCIAL

## 2018-12-18 VITALS
SYSTOLIC BLOOD PRESSURE: 130 MMHG | RESPIRATION RATE: 16 BRPM | DIASTOLIC BLOOD PRESSURE: 72 MMHG | BODY MASS INDEX: 40.32 KG/M2 | HEART RATE: 76 BPM | WEIGHT: 242.29 LBS | TEMPERATURE: 97.5 F | OXYGEN SATURATION: 99 %

## 2018-12-18 DIAGNOSIS — S09.90XA CLOSED HEAD INJURY, INITIAL ENCOUNTER: ICD-10-CM

## 2018-12-18 DIAGNOSIS — S05.11XA PERIORBITAL CONTUSION OF RIGHT EYE, INITIAL ENCOUNTER: ICD-10-CM
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
## 2018-12-18 PROCEDURE — 70480 CT ORBIT/EAR/FOSSA W/O DYE: CPT

## 2018-12-18 PROCEDURE — 99284 EMERGENCY DEPT VISIT MOD MDM: CPT

## 2018-12-18 PROCEDURE — 700101 HCHG RX REV CODE 250

## 2018-12-18 RX ORDER — PROPARACAINE HYDROCHLORIDE 5 MG/ML
SOLUTION/ DROPS OPHTHALMIC
Status: COMPLETED
Start: 2018-12-18 | End: 2018-12-18

## 2018-12-18 RX ORDER — PROPARACAINE HYDROCHLORIDE 5 MG/ML
2 SOLUTION/ DROPS OPHTHALMIC ONCE
Status: COMPLETED | OUTPATIENT
Start: 2018-12-18 | End: 2018-12-18

## 2018-12-18 RX ADMIN — PROPARACAINE HYDROCHLORIDE 2 DROP: 5 SOLUTION/ DROPS OPHTHALMIC at 16:45

## 2018-12-18 RX ADMIN — FLUORESCEIN SODIUM 1 STRIP: 0.6 STRIP OPHTHALMIC at 16:45

## 2018-12-18 ASSESSMENT — PAIN SCALES - GENERAL: PAINLEVEL_OUTOF10: 4
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
## 2018-12-19 NOTE — ED NOTES
.Patient discharged in stable condition per orders.. Patient verbalized understanding of all discharge instructions. All belongings accounted for.

## 2018-12-19 NOTE — DISCHARGE INSTRUCTIONS
Head Injuries, Adult    Return for worsening headache, repeated vomiting, confusion, seizure, unequal pupils or difficulty arousing from sleep.  Avoid activities that may cause a repeat concussion for 1 weeks.  Take ibuprofen and Tylenol for pain.  You can apply ice around the eye.  Expect the edema and bruising to track lower in the face.    You had a borderline or high normal blood pressure reading today.  This does not necessarily mean you have hypertension.  Please followup with your/a primary physician for comprehensive blood pressure evaluation and yearly fasting cholesterol assessment.  BP Readings from Last 3 Encounters:   12/18/18 129/73   09/19/18 106/72   05/03/18 110/62         You have had a head injury which does not appear serious at this time. A concussion is a state of changed mental ability, usually from a blow to the head. You should take clear liquids for the rest of the day and then resume your regular diet. You should not take sedatives or alcoholic beverages for 48 hours after discharge. After injuries such as yours, most problems occur within the first 24 hours.    THESE MINOR SYMPTOMS MAY BE SEEN AFTER DISCHARGE:  Memory difficulties  Dizziness  Headaches Double vision  Hearing difficulties   Depression Tiredness  Weakness  Difficulty with concentration      If you experience any of these problems, you should not be alarmed. A bruise on the brain (concussion) requires a few days for recovery. This is the same as a bruise elsewhere on the body. Many patients with head injuries frequently experience such symptoms. Usually, these problems disappear without medical care. If symptoms last for more than one day, notify your caregiver. See your caregiver sooner if symptoms are becoming worse rather than better.    HOME CARE INSTRUCTIONS  During the next 24 hours you must stay with someone who can watch you for the above warning signs.  This person should wake you up every 30 minutes for 3 hours or  as directed to check on your condition, noting any of the above signs or symptoms. Problems which are getting worse mean you should call or return immediately to the facility where you were just seen, or to the nearest emergency department. In case of emergency or unconsciousness, dial 911.    Although it is unlikely that serious side effects will occur, you should be aware of signs and symptoms which may necessitate your return to this location. Side effects may occur up to 7 - 10 days following the injury.  It is important for you to carefully monitor your condition and contact your caregiver or seek immediate medical attention if there is a change in your condition.    SEEK IMMEDIATE MEDICAL ATTENTION IF:  There is confusion or drowsiness.   You can not awaken the injured person.  There is nausea (feeling sick to your stomach) or continued, forceful vomiting.  You notice dizziness or unsteadiness which is getting worse, or inability to walk.  You have convulsions or unconsciousness.  You experience severe, persistent headaches not relieved by Tylenol®. (Do not take aspirin as this impairs clotting abilities). Take other pain medications only as directed.  You can not use arms or legs normally.  There are changes in pupil sizes. (This is the black center in the colored part of the eye)  There is clear or bloody discharge from the nose or ears.    AGREEMENT BETWEEN PATIENT AND HEALTHCARE TEAM:  Your signature on this document represents an understanding between you and the healthcare team that took care of you today.  That means that you:  Understand these discharge instructions.   Will monitor your condition.  Will seek immediate medical attention as instructed.    Document Released: 12/18/2006  Document Re-Released: 06/30/2008  [employer]® Patient Information ©2009 [employer].
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
## 2018-12-26 ENCOUNTER — OFFICE VISIT (OUTPATIENT)
Dept: MEDICAL GROUP | Age: 48
End: 2018-12-26
Payer: COMMERCIAL

## 2018-12-26 VITALS
BODY MASS INDEX: 39.95 KG/M2 | WEIGHT: 239.8 LBS | DIASTOLIC BLOOD PRESSURE: 74 MMHG | OXYGEN SATURATION: 94 % | HEART RATE: 86 BPM | HEIGHT: 65 IN | TEMPERATURE: 96.3 F | SYSTOLIC BLOOD PRESSURE: 112 MMHG

## 2018-12-26 DIAGNOSIS — Z01.419 ENCOUNTER FOR GYNECOLOGICAL EXAMINATION: ICD-10-CM

## 2018-12-26 DIAGNOSIS — E66.9 OBESITY (BMI 35.0-39.9 WITHOUT COMORBIDITY): ICD-10-CM

## 2018-12-26 DIAGNOSIS — Z23 NEED FOR INFLUENZA VACCINATION: ICD-10-CM

## 2018-12-26 DIAGNOSIS — Z12.11 SCREENING FOR COLORECTAL CANCER: ICD-10-CM

## 2018-12-26 DIAGNOSIS — F33.1 MODERATE EPISODE OF RECURRENT MAJOR DEPRESSIVE DISORDER (HCC): ICD-10-CM

## 2018-12-26 DIAGNOSIS — J45.20 MILD INTERMITTENT ASTHMA WITHOUT COMPLICATION: ICD-10-CM

## 2018-12-26 DIAGNOSIS — Z12.12 SCREENING FOR COLORECTAL CANCER: ICD-10-CM

## 2018-12-26 DIAGNOSIS — E78.00 HYPERCHOLESTEROLEMIA: ICD-10-CM

## 2018-12-26 DIAGNOSIS — Z12.31 VISIT FOR SCREENING MAMMOGRAM: ICD-10-CM

## 2018-12-26 DIAGNOSIS — E55.9 VITAMIN D INSUFFICIENCY: ICD-10-CM

## 2018-12-26 PROBLEM — F33.9 RECURRENT MAJOR DEPRESSIVE DISORDER (HCC): Status: ACTIVE | Noted: 2018-12-26

## 2018-12-26 PROCEDURE — 90471 IMMUNIZATION ADMIN: CPT | Performed by: INTERNAL MEDICINE

## 2018-12-26 PROCEDURE — 90686 IIV4 VACC NO PRSV 0.5 ML IM: CPT | Performed by: INTERNAL MEDICINE

## 2018-12-26 PROCEDURE — 99204 OFFICE O/P NEW MOD 45 MIN: CPT | Mod: 25 | Performed by: INTERNAL MEDICINE

## 2018-12-26 RX ORDER — FLUOXETINE HYDROCHLORIDE 20 MG/1
20 CAPSULE ORAL
Qty: 30 CAP | Refills: 0 | Status: SHIPPED | OUTPATIENT
Start: 2018-12-26 | End: 2019-02-27

## 2018-12-26 RX ORDER — BUPROPION HYDROCHLORIDE 75 MG/1
75 TABLET ORAL 2 TIMES DAILY
Qty: 90 TAB | Refills: 3 | Status: SHIPPED | OUTPATIENT
Start: 2018-12-26 | End: 2019-01-30 | Stop reason: SDUPTHER

## 2018-12-26 NOTE — LETTER
[facility]  Tawny Jeronimo M.D.  [address]  [place] [address]  Fax: [phone]   Authorization for Release/Disclosure of   Protected Health Information   Name: GLADIS STRONG : 1970 SSN: [id number]   Address: [address] [facility] [address]  [place] [address] Phone:    [phone] (home)    I authorize the entity listed below to release/disclose the PHI below to:   [facility]/Tawny Jeronimo M.D. and Tawny Jeronimo M.D.   Provider or Entity Name:  Migdalia Foley   Address   City, State, [facility]   Phone:      Fax:     Reason for request: continuity of care   Information to be released:    [  ] LAST COLONOSCOPY,  including any PATH REPORT and follow-up  [  ] LAST FIT/COLOGUARD RESULT [  ] LAST DEXA  [  ] LAST MAMMOGRAM  [  ] LAST PAP  [  ] LAST LABS [  ] RETINA EXAM REPORT  [  ] IMMUNIZATION RECORDS  [XXX] Release all info      [  ] Check here and initial the line next to each item to release ALL health information INCLUDING  _____ Care and treatment for drug and / or alcohol abuse  _____ HIV testing, infection status, or AIDS  _____ Genetic Testing    DATES OF SERVICE OR TIME PERIOD TO BE DISCLOSED: _____________  I understand and acknowledge that:  * This Authorization may be revoked at any time by you in writing, except if your health information has already been used or disclosed.  * Your health information that will be used or disclosed as a result of you signing this authorization could be re-disclosed by the recipient. If this occurs, your re-disclosed health information may no longer be protected by State or Federal laws.  * You may refuse to sign this Authorization. Your refusal will not affect your ability to obtain treatment.  * This Authorization becomes effective upon signing and will  on (date) __________.      If no date is indicated, this Authorization will  one (1) year from the signature date.    Name: Gladis Strong    Signature:   Date:     2018       PLEASE FAX REQUESTED RECORDS  BACK TO: [phone]

## 2018-12-26 NOTE — LETTER
[facility]  Tawny Jeronimo M.D.  [address]  [place] [address]  Fax: [phone]   Authorization for Release/Disclosure of   Protected Health Information   Name: GLADIS TOVAR : 1970 SSN: [id number]   Address: [facility] [address]  [place] [address] Phone:    [phone] (home)    I authorize the entity listed below to release/disclose the PHI below to:   [facility]/Tawny Jeronimo M.D. and Tawny Jeronimo M.D.   Provider or Entity Name:  [facility] Address   City, [facility]   Phone:      Fax:     Reason for request: continuity of care   Information to be released:    [  ] LAST COLONOSCOPY,  including any PATH REPORT and follow-up  [  ] LAST FIT/COLOGUARD RESULT [  ] LAST DEXA  [  ] LAST MAMMOGRAM  [  ] LAST PAP  [  ] LAST LABS [  ] RETINA EXAM REPORT  [  ] IMMUNIZATION RECORDS  [XXXX] Release all info      [  ] Check here and initial the line next to each item to release ALL health information INCLUDING  _____ Care and treatment for drug and / or alcohol abuse  _____ HIV testing, infection status, or AIDS  _____ Genetic Testing    DATES OF SERVICE OR TIME PERIOD TO BE DISCLOSED: _____________  I understand and acknowledge that:  * This Authorization may be revoked at any time by you in writing, except if your health information has already been used or disclosed.  * Your health information that will be used or disclosed as a result of you signing this authorization could be re-disclosed by the recipient. If this occurs, your re-disclosed health information may no longer be protected by State or Federal laws.  * You may refuse to sign this Authorization. Your refusal will not affect your ability to obtain treatment.  * This Authorization becomes effective upon signing and will  on (date) __________.      If no date is indicated, this Authorization will  one (1) year from the signature date.    Name: Gladis Tovar    Signature:   Date:     2018       PLEASE FAX REQUESTED RECORDS  BACK TO: [phone]

## 2018-12-27 NOTE — ASSESSMENT & PLAN NOTE
Patient was diagnosed with allergic asthma by Dr. Parmar, allergy specialist in 2018.  She was prescribed to take albuterol inhaler as needed.  Patient stated that her asthma is mild and well controlled.  Her asthma is usually triggered by cigarette smoke or perfume.

## 2018-12-27 NOTE — ASSESSMENT & PLAN NOTE
Patient has low vitamin D level at 11 on 3/30/17.  She is taking vitamin D 2000 units daily.  She has not rechecked her vitamin D level.

## 2018-12-27 NOTE — ASSESSMENT & PLAN NOTE
Patient reported that she noticed gaining weight in the past few years.  She stated that she is physically active and she walks a lot at work.  She is  at a high school.  She does not eat too much meat that she likes to eat carbohydrate.  She also takes Seasonique oral contraceptive pill daily for over 10 years.  She also takes Prozac 40 mg daily for depression.  I discussed with patient that both oral contraceptive pill and antidepressant can cause weight gain as well.

## 2018-12-27 NOTE — ASSESSMENT & PLAN NOTE
Patient stated that she was diagnosed with depression since 1998 and she was treated with Paxil and she did not tolerate.  She is doing well with Prozac.  She is taking Prozac 20 mg daily for many years and she increased the dose of Prozac to 40 mg in the past few years due to increasing stress at work.  She states that her depression is well controlled but she noticed gaining weight a lot.  She also noticed decreasing libido.  I discussed with patient to try Wellbutrin.  Patient is interested to take Wellbutrin.  She wanted to completely wean off Prozac and switch to Wellbutrin.  I discussed with patient how to wean off Prozac and start with low-dose Wellbutrin 75 mg twice daily.  She agreed with the plan.  She denies suicidal ideation or plan or homicidal ideation or plan.

## 2018-12-27 NOTE — PROGRESS NOTES
Gladis Tovar is a 48 y.o. female here to establish care and the evaluation and management of:      HPI:    Vitamin D insufficiency  Patient has low vitamin D level at 11 on 3/30/17.  She is taking vitamin D 2000 units daily.  She has not rechecked her vitamin D level.    Obesity (BMI 35.0-39.9 without comorbidity)  Patient reported that she noticed gaining weight in the past few years.  She stated that she is physically active and she walks a lot at work.  She is  at a high school.  She does not eat too much meat that she likes to eat carbohydrate.  She also takes Seasonique oral contraceptive pill daily for over 10 years.  She also takes Prozac 40 mg daily for depression.  I discussed with patient that both oral contraceptive pill and antidepressant can cause weight gain as well.    Moderate episode of recurrent major depressive disorder (HCC)  Patient stated that she was diagnosed with depression since 1998 and she was treated with Paxil and she did not tolerate.  She is doing well with Prozac.  She is taking Prozac 20 mg daily for many years and she increased the dose of Prozac to 40 mg in the past few years due to increasing stress at work.  She states that her depression is well controlled but she noticed gaining weight a lot.  She also noticed decreasing libido.  I discussed with patient to try Wellbutrin.  Patient is interested to take Wellbutrin.  She wanted to completely wean off Prozac and switch to Wellbutrin.  I discussed with patient how to wean off Prozac and start with low-dose Wellbutrin 75 mg twice daily.  She agreed with the plan.  She denies suicidal ideation or plan or homicidal ideation or plan.    Mild intermittent asthma without complication  Patient was diagnosed with allergic asthma by Dr. Parmar, allergy specialist in 2018.  She was prescribed to take albuterol inhaler as needed.  Patient stated that her asthma is mild and well controlled.  Her asthma is usually triggered by  cigarette smoke or perfume.    Hypercholesterolemia  Patient has high triglyceride and LDL cholesterol in the past.  She has never been treated with medication.  She tries to control cholesterol with diet and exercise.  I discussed her previous blood test with her in clinic today.    Results for SHILPI STRONG (MRN [id number]) as of 12/26/2018 18:27   Ref. Range 3/30/2017 11:10   Cholesterol,Tot Latest Ref Range: 100 - 199 mg/dL 186   Triglycerides Latest Ref Range: 0 - 149 mg/dL 159 (H)   HDL Latest Ref Range: >=40 mg/dL 43   LDL Latest Ref Range: <100 mg/dL 111 (H)     Current medicines (including changes today)  Current Outpatient Prescriptions   Medication Sig Dispense Refill   • vitamin D (CHOLECALCIFEROL) 1000 UNIT Tab Take 2,000 Units by mouth every day.     • Albuterol Sulfate 108 (90 Base) MCG/ACT AEROSOL POWDER, BREATH ACTIVATED Inhale  by mouth.     • buPROPion (WELLBUTRIN) 75 MG Tab Take 1 Tab by mouth 2 times a day. 90 Tab 3   • FLUoxetine (PROZAC) 20 MG Cap Take 1 Cap by mouth every 48 hours. 30 Cap 0   • Levonorgest-Eth Estrad 91-Day (SEASONIQUE PO) Take 1 Tab by mouth every day.       No current facility-administered medications for this visit.      She  has a past medical history of Depression.  She  has a past surgical history that includes tonsillectomy; breast reconstruction; appendectomy (1994); cervical cerclage; and shoulder surgery (Bilateral).  Social History   Substance Use Topics   • Smoking status: Never Smoker   • Smokeless tobacco: Never Used   • Alcohol use Yes      Comment: occ     Social History     Social History Narrative   • No narrative on file     Family History   Problem Relation Age of Onset   • Lung Disease Mother    • Arthritis Mother    • Diabetes Mother    • Hypertension Father    • Alcohol/Drug Brother    • Arthritis Maternal Grandmother    • Cancer Maternal Grandmother    • Diabetes Maternal Grandmother      Family Status   Relation Status   • Mo Alive   • Fa Alive   • Bro  "Alive   • MGMo (Not Specified)     Health Maintenance Topics with due status: Overdue       Topic Date Due    IMM PNEUMOCOCCAL 19-64 (ADULT) MEDIUM RISK SERIES 10/29/1987    PAP SMEAR 10/29/1989    MAMMOGRAM 04/03/2018    COLONOSCOPY 10/29/2018    IMM ZOSTER VACCINES 10/29/2018         ROS    Gen.: Denied weight change, appetite change, fatigue.  ENT: Denied sinus tenderness, nasal congestion, runny nose, or sore throat  CVS: Denied chest pain, palpitations, legs swelling.  Respiratory: Denied cough, shortness of breath, wheezing.  GI: Denied abdominal pain, constipation or diarrhea.  Endocrine: Denied temperature intolerance, increased frequency of urination, polyphagia or polydipsia.  Musculoskeletal: Denied back pain or joint pain.    All other systems reviewed and are negative     Objective:     Blood pressure 112/74, pulse 86, temperature (!) 35.7 °C (96.3 °F), temperature source Temporal, height 1.659 m (5' 5.3\"), weight 108.8 kg (239 lb 12.8 oz), last menstrual period 09/26/2017, SpO2 94 %, not currently breastfeeding. Body mass index is 39.54 kg/m².  Physical Exam:    Constitutional: Well nourished and Well developed, Alert, no distress.  Skin: Warm, dry, good turgor, no rashes in visible areas.  Eye: Equal, round and reactive, conjunctiva clear, lids normal.  ENMT: Lips without lesions, good dentition, oropharynx clear.  Neck: Trachea midline, no masses, no thyromegaly. No cervical or supraclavicular lymphadenopathy.  Respiratory: Unlabored respiratory effort, lungs clear to auscultation, no wheezes, no ronchi.  Cardiovascular: Normal S1, S2, no murmur, no edema.   Abdomen: Soft, non distended, non-tender, no masses, no hepatosplenomegaly. Bowel sound normal.  Extremities: No edema, no clubbing, no cyanosis.  Psych: Alert and oriented x3, normal affect and mood.          Assessment and Plan:   The following treatment plan was discussed       1. Moderate episode of recurrent major depressive disorder " (HCC)  - Patient wanted to completely wean off Prozac due to decreased libido and weight gain.  I discussed with patient to wean down Prozac from 40 mg daily to 20 mg daily for 1-2 weeks and then cut down Prozac to 20 mg every other days for 2 weeks and then Prozac 20 mg twice a week and then stop.  She will take Wellbutrin 75 mg twice daily.  Patient is advised to closely monitor any potential side effects of Wellbutrin and Prozac and any mood changes.  - Discussed with patient regarding the use and side effects of medication as well as black box warning of suicidality risk with medication. Patient verbally understands. Recommend to call 911 or go to ER if patient has suicidal ideation or plan.  - buPROPion (WELLBUTRIN) 75 MG Tab; Take 1 Tab by mouth 2 times a day.  Dispense: 90 Tab; Refill: 3  - FLUoxetine (PROZAC) 20 MG Cap; Take 1 Cap by mouth every 48 hours.  Dispense: 30 Cap; Refill: 0  - CBC WITH DIFFERENTIAL; Future  - COMP METABOLIC PANEL; Future  - TSH; Future  - FREE THYROXINE; Future    2. Obesity (BMI 35.0-39.9 without comorbidity)  -We discussed to eat low carbohydrate and low-fat diet.  I advised patient to do cardio exercise 5 days a week, 30 minutes to 45 minutes each time.  Will wean off Prozac.  Patient is encouraged to avoid processed sugar as well.    3. Mild intermittent asthma without complication  - Well-controlled. Continue current regimens, albuterol inhaler 2 puffs every 6 hours as needed.  Reviewed potential side effects of albuterol inhaler with patient and advised patient not to over using it.. Recheck lab 1-2 weeks before next follow up visit.  Advised to follow with allergy specialist as scheduled.  - Albuterol Sulfate 108 (90 Base) MCG/ACT AEROSOL POWDER, BREATH ACTIVATED; Inhale  by mouth.  - CBC WITH DIFFERENTIAL; Future  - COMP METABOLIC PANEL; Future  - TSH; Future  - FREE THYROXINE; Future    4. Vitamin D insufficiency  - Continue vitamin D 2000 units daily.  Recheck vitamin D  in 2-3 months.  - vitamin D (CHOLECALCIFEROL) 1000 UNIT Tab; Take 2,000 Units by mouth every day.  - VITAMIN D,25 HYDROXY; Future    5. Hypercholesterolemia  - Not on medication yet.  Continue to control with diet and exercise.  Recheck lab in 3 months.  - Advised to eat low fat, low carbohydrate and high fiber diet as well as do cardio physical exercise regularly.  - COMP METABOLIC PANEL; Future  - Lipid Profile; Future  - TSH; Future  - FREE THYROXINE; Future    6. Encounter for gynecological examination  - Patient would like to refer to gynecologist for discussion of switching oral contraceptive pill to different contraception method as she is probably going to menopause within 5 years.  She also needs to do routine pelvic exam and Pap smear.  - REFERRAL TO GYNECOLOGY    7. Screening for colorectal cancer  - Patient stated that she has colonoscopy 10 years ago with [facility] and has some polyps removed.  Refer to digestive health for colonoscopy.  - REFERRAL TO  FOR COLONOSCOPY    8. Visit for screening mammogram  - Counseling for breast cancer screening.  Ordered screening mammogram.  - MA-SCREEN MAMMO W/CAD-BILAT; Future    9. Need for influenza vaccination  - Influenza vaccine was given today after reviewing risks and benefits as well as side effects of vaccine.  - Influenza Vaccine Quad Injection >3Y (PF)        Records requested.  Followup: Return in about 8 weeks (around 2/20/2019), or if symptoms worsen or fail to improve, for Hypercholesterolemia, depression, Vitamin D insufficiency, Asthma, Lab review. sooner should new symptoms or problems arise.      Please note that this dictation was created using voice recognition software. I have made every reasonable attempt to correct obvious errors, but I expect that there may have unintended errors in text, spelling, punctuation, or grammar that I did not discover.

## 2018-12-27 NOTE — ASSESSMENT & PLAN NOTE
Patient has high triglyceride and LDL cholesterol in the past.  She has never been treated with medication.  She tries to control cholesterol with diet and exercise.  I discussed her previous blood test with her in clinic today.    Results for SHILPI STRONG (MRN [id number]) as of 12/26/2018 18:27   Ref. Range 3/30/2017 11:10   Cholesterol,Tot Latest Ref Range: 100 - 199 mg/dL 186   Triglycerides Latest Ref Range: 0 - 149 mg/dL 159 (H)   HDL Latest Ref Range: >=40 mg/dL 43   LDL Latest Ref Range: <100 mg/dL 111 (H)

## 2019-01-10 ENCOUNTER — HOSPITAL ENCOUNTER (OUTPATIENT)
Dept: RADIOLOGY | Facility: MEDICAL CENTER | Age: 49
End: 2019-01-10
Attending: INTERNAL MEDICINE
Payer: COMMERCIAL

## 2019-01-10 DIAGNOSIS — Z12.31 VISIT FOR SCREENING MAMMOGRAM: ICD-10-CM

## 2019-01-10 PROCEDURE — 77063 BREAST TOMOSYNTHESIS BI: CPT

## 2019-01-30 DIAGNOSIS — F33.1 MODERATE EPISODE OF RECURRENT MAJOR DEPRESSIVE DISORDER (HCC): ICD-10-CM

## 2019-01-30 RX ORDER — BUPROPION HYDROCHLORIDE 100 MG/1
100 TABLET ORAL 2 TIMES DAILY
Qty: 180 TAB | Refills: 3 | Status: SHIPPED | OUTPATIENT
Start: 2019-01-30 | End: 2019-11-27 | Stop reason: SDUPTHER

## 2019-01-31 NOTE — PROGRESS NOTES
Patient sent my chart message regarding her depression is not well controlled by taking Wellbutrin 75 mg twice daily.  She is completely wean off Prozac.  She is currently taking Wellbutrin alone.  I advised patient to increase the dose of Wellbutrin from 75 mg twice daily to 100 mg twice daily.  I advised patient to return back to clinic as scheduled on 2/27/19.  I also advised patient to return back to clinic sooner if her depression is not well controlled.  I also offered patient to see psychiatrist and psychologist and I can place referral for her if she wants to see psychiatrist and/or psychologist.    Please see patient email documented on 1/30/19 for detailed discussion.    Tawny Jeronimo M.D.

## 2019-02-13 ENCOUNTER — OFFICE VISIT (OUTPATIENT)
Dept: URGENT CARE | Facility: CLINIC | Age: 49
End: 2019-02-13
Payer: COMMERCIAL

## 2019-02-13 VITALS
OXYGEN SATURATION: 98 % | HEIGHT: 63 IN | DIASTOLIC BLOOD PRESSURE: 90 MMHG | RESPIRATION RATE: 16 BRPM | BODY MASS INDEX: 42.7 KG/M2 | HEART RATE: 87 BPM | SYSTOLIC BLOOD PRESSURE: 136 MMHG | WEIGHT: 241 LBS | TEMPERATURE: 98.7 F

## 2019-02-13 DIAGNOSIS — J40 BRONCHITIS: Primary | ICD-10-CM

## 2019-02-13 DIAGNOSIS — J02.9 SORE THROAT: ICD-10-CM

## 2019-02-13 LAB
INT CON NEG: NORMAL
INT CON POS: NORMAL
S PYO AG THROAT QL: NEGATIVE

## 2019-02-13 PROCEDURE — 87880 STREP A ASSAY W/OPTIC: CPT | Performed by: NURSE PRACTITIONER

## 2019-02-13 PROCEDURE — 99214 OFFICE O/P EST MOD 30 MIN: CPT | Performed by: NURSE PRACTITIONER

## 2019-02-13 RX ORDER — BENZONATATE 200 MG/1
200 CAPSULE ORAL EVERY 8 HOURS PRN
Qty: 21 CAP | Refills: 0 | Status: SHIPPED | OUTPATIENT
Start: 2019-02-13 | End: 2019-02-27

## 2019-02-13 RX ORDER — DOXYCYCLINE HYCLATE 100 MG
100 TABLET ORAL 2 TIMES DAILY
Qty: 20 TAB | Refills: 0 | Status: SHIPPED | OUTPATIENT
Start: 2019-02-13 | End: 2019-02-23

## 2019-02-13 RX ORDER — METHYLPREDNISOLONE 4 MG/1
TABLET ORAL
Qty: 1 KIT | Refills: 0 | Status: SHIPPED | OUTPATIENT
Start: 2019-02-13 | End: 2019-02-27

## 2019-02-13 ASSESSMENT — ENCOUNTER SYMPTOMS
COUGH: 1
WHEEZING: 1
SHORTNESS OF BREATH: 0
PSYCHIATRIC NEGATIVE: 1
SINUS PAIN: 0
SPUTUM PRODUCTION: 1
FEVER: 1
FOCAL WEAKNESS: 0
EYES NEGATIVE: 1
MYALGIAS: 1
CHILLS: 1
TINGLING: 0
SENSORY CHANGE: 0
NEUROLOGICAL NEGATIVE: 1
DIZZINESS: 0
WEAKNESS: 0
GASTROINTESTINAL NEGATIVE: 1
SORE THROAT: 1

## 2019-02-14 NOTE — PROGRESS NOTES
Subjective:     Gladis Tovar is a 48 y.o. female who presents for Pharyngitis (cough, ear ache for 2 weeks)       Cough   This is a new problem. Episode onset: 2 weeks ago. The problem has been gradually worsening. The cough is productive of sputum. Associated symptoms include chills, a fever, myalgias, a sore throat and wheezing. Pertinent negatives include no chest pain, ear pain or shortness of breath. Treatments tried: Mucinex, Tessalon. Her past medical history is significant for bronchitis.   Pt reports hx of mild asthma which has been controlled with PRN albuterol inhaler.    PMH:  has a past medical history of Depression.    MEDS:   Current Outpatient Prescriptions:   •  doxycycline (VIBRAMYCIN) 100 MG Tab, Take 1 Tab by mouth 2 times a day for 10 days., Disp: 20 Tab, Rfl: 0  •  MethylPREDNISolone (MEDROL DOSEPAK) 4 MG Tablet Therapy Pack, Use as directed on package, Disp: 1 Kit, Rfl: 0  •  benzonatate (TESSALON) 200 MG capsule, Take 1 Cap by mouth every 8 hours as needed for Cough., Disp: 21 Cap, Rfl: 0  •  buPROPion (WELLBUTRIN) 100 MG Tab, Take 1 Tab by mouth 2 times a day., Disp: 180 Tab, Rfl: 3  •  vitamin D (CHOLECALCIFEROL) 1000 UNIT Tab, Take 2,000 Units by mouth every day., Disp: , Rfl:   •  Albuterol Sulfate 108 (90 Base) MCG/ACT AEROSOL POWDER, BREATH ACTIVATED, Inhale  by mouth., Disp: , Rfl:   •  Levonorgest-Eth Estrad 91-Day (SEASONIQUE PO), Take 1 Tab by mouth every day., Disp: , Rfl:   •  FLUoxetine (PROZAC) 20 MG Cap, Take 1 Cap by mouth every 48 hours. (Patient not taking: Reported on 2/13/2019), Disp: 30 Cap, Rfl: 0    ALLERGIES:   Allergies   Allergen Reactions   • Codeine Unspecified     Heart stops  RXN=age 3     SURGHX:   Past Surgical History:   Procedure Laterality Date   • APPENDECTOMY  1994   • BREAST RECONSTRUCTION      REDUCTION 1991   • CERVICAL CERCLAGE     • SHOULDER SURGERY Bilateral    • TONSILLECTOMY       SOCHX:  reports that she has never smoked. She has never used smokeless  "tobacco. She reports that she drinks alcohol. She reports that she does not use drugs.     FH: Reviewed with patient, not pertinent to this visit.     Review of Systems   Constitutional: Positive for chills, fever and malaise/fatigue.   HENT: Positive for sore throat. Negative for congestion, ear pain and sinus pain.    Eyes: Negative.    Respiratory: Positive for cough, sputum production and wheezing. Negative for shortness of breath.    Cardiovascular: Negative for chest pain.   Gastrointestinal: Negative.    Genitourinary: Negative.    Musculoskeletal: Positive for myalgias.   Skin: Negative.    Neurological: Negative.  Negative for dizziness, tingling, sensory change, focal weakness and weakness.   Psychiatric/Behavioral: Negative.    All other systems reviewed and are negative.    Objective:     /90 (BP Location: Right arm, Patient Position: Sitting, BP Cuff Size: Adult)   Pulse 87   Temp 37.1 °C (98.7 °F) (Temporal)   Resp 16   Ht 1.6 m (5' 3\")   Wt 109.3 kg (241 lb)   SpO2 98%   BMI 42.69 kg/m²     Physical Exam   Constitutional: She is oriented to person, place, and time. She appears well-developed and well-nourished. She is cooperative. No distress.   HENT:   Head: Normocephalic.   Right Ear: Tympanic membrane and external ear normal.   Left Ear: Tympanic membrane and external ear normal.   Nose: Rhinorrhea present. Right sinus exhibits no maxillary sinus tenderness and no frontal sinus tenderness. Left sinus exhibits no maxillary sinus tenderness and no frontal sinus tenderness.   Mouth/Throat: Uvula is midline and mucous membranes are normal. Posterior oropharyngeal edema and posterior oropharyngeal erythema present. No oropharyngeal exudate.   Eyes: Pupils are equal, round, and reactive to light. Conjunctivae and EOM are normal.   Neck: Normal range of motion.   Cardiovascular: Normal rate, regular rhythm, normal heart sounds and normal pulses.    Pulmonary/Chest: Effort normal. No " respiratory distress. She has no decreased breath sounds. She has rhonchi.   Abdominal: Soft. Bowel sounds are normal.   Musculoskeletal: Normal range of motion. She exhibits no deformity.   Lymphadenopathy:     She has no cervical adenopathy.   Neurological: She is alert and oriented to person, place, and time. She has normal strength. No sensory deficit.   Skin: Skin is warm and dry. Capillary refill takes less than 2 seconds.   Psychiatric: She has a normal mood and affect.   Vitals reviewed.    Rapid Strep A swab: negative       Assessment/Plan:     1. Bronchitis  - doxycycline (VIBRAMYCIN) 100 MG Tab; Take 1 Tab by mouth 2 times a day for 10 days.  Dispense: 20 Tab; Refill: 0  - MethylPREDNISolone (MEDROL DOSEPAK) 4 MG Tablet Therapy Pack; Use as directed on package  Dispense: 1 Kit; Refill: 0  - benzonatate (TESSALON) 200 MG capsule; Take 1 Cap by mouth every 8 hours as needed for Cough.  Dispense: 21 Cap; Refill: 0    2. Sore throat  - POCT Rapid Strep A    Pt states temp of 98.7 is unusually high for her. Symptoms getting worse, especially productive cough. Has been taking multiple OTC medications as well as Tessalon 100 mg from previous visit with no relief. Hx of asthma. Will send Rx electronically for abx, Medrol Dosepak, and Tessalon 200 mg. Pt states she still has albuterol inhaler available. Discussed supportive measures including increasing fluids and rest as well as OTC symptom management including acetaminophen and/or ibuprofen PRN pain and/or fever.     Patient advised to: Return for 1) Symptoms don't improve or worsen, or go to ER, 2) Follow up with primary care in 7-10 days.    Differential diagnosis, natural history, supportive care, and indications for immediate follow-up discussed. All questions answered. Patient agrees with the plan of care.

## 2019-02-14 NOTE — PATIENT INSTRUCTIONS

## 2019-02-22 ENCOUNTER — HOSPITAL ENCOUNTER (OUTPATIENT)
Dept: LAB | Facility: MEDICAL CENTER | Age: 49
End: 2019-02-22
Attending: INTERNAL MEDICINE
Payer: COMMERCIAL

## 2019-02-22 DIAGNOSIS — E78.00 HYPERCHOLESTEROLEMIA: ICD-10-CM

## 2019-02-22 DIAGNOSIS — F33.1 MODERATE EPISODE OF RECURRENT MAJOR DEPRESSIVE DISORDER (HCC): ICD-10-CM

## 2019-02-22 DIAGNOSIS — E55.9 VITAMIN D INSUFFICIENCY: ICD-10-CM

## 2019-02-22 DIAGNOSIS — J45.20 MILD INTERMITTENT ASTHMA WITHOUT COMPLICATION: ICD-10-CM

## 2019-02-22 LAB
25(OH)D3 SERPL-MCNC: 20 NG/ML (ref 30–100)
ALBUMIN SERPL BCP-MCNC: 3.8 G/DL (ref 3.2–4.9)
ALBUMIN/GLOB SERPL: 1.7 G/DL
ALP SERPL-CCNC: 65 U/L (ref 30–99)
ALT SERPL-CCNC: 13 U/L (ref 2–50)
ANION GAP SERPL CALC-SCNC: 8 MMOL/L (ref 0–11.9)
AST SERPL-CCNC: 17 U/L (ref 12–45)
BASOPHILS # BLD AUTO: 0.4 % (ref 0–1.8)
BASOPHILS # BLD: 0.04 K/UL (ref 0–0.12)
BILIRUB SERPL-MCNC: 0.5 MG/DL (ref 0.1–1.5)
BUN SERPL-MCNC: 12 MG/DL (ref 8–22)
CALCIUM SERPL-MCNC: 9.1 MG/DL (ref 8.5–10.5)
CHLORIDE SERPL-SCNC: 106 MMOL/L (ref 96–112)
CHOLEST SERPL-MCNC: 145 MG/DL (ref 100–199)
CO2 SERPL-SCNC: 23 MMOL/L (ref 20–33)
CREAT SERPL-MCNC: 0.79 MG/DL (ref 0.5–1.4)
EOSINOPHIL # BLD AUTO: 0.01 K/UL (ref 0–0.51)
EOSINOPHIL NFR BLD: 0.1 % (ref 0–6.9)
ERYTHROCYTE [DISTWIDTH] IN BLOOD BY AUTOMATED COUNT: 45.6 FL (ref 35.9–50)
FASTING STATUS PATIENT QL REPORTED: NORMAL
GLOBULIN SER CALC-MCNC: 2.2 G/DL (ref 1.9–3.5)
GLUCOSE SERPL-MCNC: 88 MG/DL (ref 65–99)
HCT VFR BLD AUTO: 43.2 % (ref 37–47)
HDLC SERPL-MCNC: 38 MG/DL
HGB BLD-MCNC: 13.9 G/DL (ref 12–16)
IMM GRANULOCYTES # BLD AUTO: 0.1 K/UL (ref 0–0.11)
IMM GRANULOCYTES NFR BLD AUTO: 1 % (ref 0–0.9)
LDLC SERPL CALC-MCNC: 75 MG/DL
LYMPHOCYTES # BLD AUTO: 4.27 K/UL (ref 1–4.8)
LYMPHOCYTES NFR BLD: 42.8 % (ref 22–41)
MCH RBC QN AUTO: 28.1 PG (ref 27–33)
MCHC RBC AUTO-ENTMCNC: 32.2 G/DL (ref 33.6–35)
MCV RBC AUTO: 87.4 FL (ref 81.4–97.8)
MONOCYTES # BLD AUTO: 0.87 K/UL (ref 0–0.85)
MONOCYTES NFR BLD AUTO: 8.7 % (ref 0–13.4)
NEUTROPHILS # BLD AUTO: 4.68 K/UL (ref 2–7.15)
NEUTROPHILS NFR BLD: 47 % (ref 44–72)
NRBC # BLD AUTO: 0 K/UL
NRBC BLD-RTO: 0 /100 WBC
PLATELET # BLD AUTO: 265 K/UL (ref 164–446)
PMV BLD AUTO: 9.6 FL (ref 9–12.9)
POTASSIUM SERPL-SCNC: 4.2 MMOL/L (ref 3.6–5.5)
PROT SERPL-MCNC: 6 G/DL (ref 6–8.2)
RBC # BLD AUTO: 4.94 M/UL (ref 4.2–5.4)
SODIUM SERPL-SCNC: 137 MMOL/L (ref 135–145)
T4 FREE SERPL-MCNC: 0.77 NG/DL (ref 0.53–1.43)
TRIGL SERPL-MCNC: 159 MG/DL (ref 0–149)
TSH SERPL DL<=0.005 MIU/L-ACNC: 1.01 UIU/ML (ref 0.38–5.33)
WBC # BLD AUTO: 10 K/UL (ref 4.8–10.8)

## 2019-02-22 PROCEDURE — 36415 COLL VENOUS BLD VENIPUNCTURE: CPT

## 2019-02-22 PROCEDURE — 84439 ASSAY OF FREE THYROXINE: CPT

## 2019-02-22 PROCEDURE — 80053 COMPREHEN METABOLIC PANEL: CPT

## 2019-02-22 PROCEDURE — 84443 ASSAY THYROID STIM HORMONE: CPT

## 2019-02-22 PROCEDURE — 82306 VITAMIN D 25 HYDROXY: CPT

## 2019-02-22 PROCEDURE — 80061 LIPID PANEL: CPT

## 2019-02-22 PROCEDURE — 85025 COMPLETE CBC W/AUTO DIFF WBC: CPT

## 2019-02-27 ENCOUNTER — OFFICE VISIT (OUTPATIENT)
Dept: MEDICAL GROUP | Age: 49
End: 2019-02-27
Payer: COMMERCIAL

## 2019-02-27 VITALS
OXYGEN SATURATION: 95 % | HEIGHT: 65 IN | WEIGHT: 241 LBS | SYSTOLIC BLOOD PRESSURE: 106 MMHG | BODY MASS INDEX: 40.15 KG/M2 | TEMPERATURE: 96.6 F | DIASTOLIC BLOOD PRESSURE: 64 MMHG | HEART RATE: 92 BPM

## 2019-02-27 DIAGNOSIS — F33.1 MODERATE EPISODE OF RECURRENT MAJOR DEPRESSIVE DISORDER (HCC): ICD-10-CM

## 2019-02-27 DIAGNOSIS — E55.9 VITAMIN D INSUFFICIENCY: ICD-10-CM

## 2019-02-27 DIAGNOSIS — J45.20 MILD INTERMITTENT ASTHMA WITHOUT COMPLICATION: ICD-10-CM

## 2019-02-27 DIAGNOSIS — E78.00 HYPERCHOLESTEROLEMIA: ICD-10-CM

## 2019-02-27 PROCEDURE — 99214 OFFICE O/P EST MOD 30 MIN: CPT | Performed by: INTERNAL MEDICINE

## 2019-02-27 ASSESSMENT — PATIENT HEALTH QUESTIONNAIRE - PHQ9
2. FEELING DOWN, DEPRESSED, IRRITABLE, OR HOPELESS: NOT AT ALL
SUM OF ALL RESPONSES TO PHQ9 QUESTIONS 1 AND 2: 0
7. TROUBLE CONCENTRATING ON THINGS, SUCH AS READING THE NEWSPAPER OR WATCHING TELEVISION: NOT AT ALL
1. LITTLE INTEREST OR PLEASURE IN DOING THINGS: NOT AT ALL
4. FEELING TIRED OR HAVING LITTLE ENERGY: NOT AT ALL
6. FEELING BAD ABOUT YOURSELF - OR THAT YOU ARE A FAILURE OR HAVE LET YOURSELF OR YOUR FAMILY DOWN: NOT AL ALL
SUM OF ALL RESPONSES TO PHQ QUESTIONS 1-9: 0
5. POOR APPETITE OR OVEREATING: NOT AT ALL
8. MOVING OR SPEAKING SO SLOWLY THAT OTHER PEOPLE COULD HAVE NOTICED. OR THE OPPOSITE, BEING SO FIGETY OR RESTLESS THAT YOU HAVE BEEN MOVING AROUND A LOT MORE THAN USUAL: NOT AT ALL
3. TROUBLE FALLING OR STAYING ASLEEP OR SLEEPING TOO MUCH: NOT AT ALL
9. THOUGHTS THAT YOU WOULD BE BETTER OFF DEAD, OR OF HURTING YOURSELF: NOT AT ALL

## 2019-02-28 NOTE — PROGRESS NOTES
Subjective:   Gladis Tovar is a 48 y.o. female here today for evaluation and management of:      Vitamin D insufficiency  Patient states that she is taking vitamin D 2000 units daily regularly.  Her vitamin D level improved from 11 on 3/30/17 to 20 on 2/22/19.  She denies side effects from taking vitamin D.  We discussed to increase the dose of vitamin D to 5000 units daily.  She agreed with the plan.    Moderate episode of recurrent major depressive disorder (HCC)  Patient is taking Wellbutrin 100 mg twice daily currently.  She completely wean off Prozac.  She increased her dose of Wellbutrin to 100 mg twice daily on 1/30/19.  She denies side effects from taking Wellbutrin.  She states that her depression is stable with current dose of Wellbutrin.  She reported having difficult time on switching Prozac to Wellbutrin, but she is doing better now with Wellbutrin.  She would like to stop taking Prozac due to risks of weight gain.  She is happy with her current condition.  She denied suicidal ideation or plan or homicidal ideation or plan.    Mild intermittent asthma without complication  Patient follows with Dr. Parmar, allergy specialist.  She is using albuterol inhaler as needed.  Patient states that she may use albuterol inhaler when she exposed to cold air.  Her asthma is stable and very well controlled currently.    Hypercholesterolemia  Patient tries to control her cholesterol with diet and physical exercise.  Her cholesterol level slightly improved.  She still has slightly elevated triglyceride.  She will try to control her triglyceride with diet and physical exercise.  I discussed blood test result with her in clinic today.    Results for GLADIS TOVAR (MRN [id number]) as of 2/27/2019 18:11   Ref. Range 3/30/2017 11:10 2/22/2019 07:25   Cholesterol,Tot Latest Ref Range: 100 - 199 mg/dL 186 145   Triglycerides Latest Ref Range: 0 - 149 mg/dL 159 (H) 159 (H)   HDL Latest Ref Range: >=40 mg/dL 43 38 (A)   LDL Latest Ref  "Range: <100 mg/dL 111 (H) 75          Current medicines (including changes today)  Current Outpatient Prescriptions   Medication Sig Dispense Refill   • buPROPion (WELLBUTRIN) 100 MG Tab Take 1 Tab by mouth 2 times a day. 180 Tab 3   • vitamin D (CHOLECALCIFEROL) 1000 UNIT Tab Take 5,000 Units by mouth every day.     • Albuterol Sulfate 108 (90 Base) MCG/ACT AEROSOL POWDER, BREATH ACTIVATED Inhale  by mouth.     • Levonorgest-Eth Estrad 91-Day (SEASONIQUE PO) Take 1 Tab by mouth every day.       No current facility-administered medications for this visit.      She  has a past medical history of Depression.    ROS   No chest pain, no shortness of breath, no abdominal pain       Objective:     Blood pressure 106/64, pulse 92, temperature 35.9 °C (96.6 °F), temperature source Temporal, height 1.651 m (5' 5\"), weight 109.3 kg (241 lb), SpO2 95 %, not currently breastfeeding. Body mass index is 40.1 kg/m².   Physical Exam:  General: Alert, oriented and no acute distress.  Eye contact is good, speech goal directed, affect calm  HEENT: conjunctiva non-injected, sclera non-icteric.  Oral mucous membranes pink and moist with no lesions.  Pinna normal.   Lungs: Normal respiratory effort, clear to auscultation bilaterally with good excursion.  CV: regular rate and rhythm. No murmurs.  Abdomen: soft, non distended, nontender, Bowel sound normal.  Ext: no edema, color normal, vascularity normal, temperature normal        Assessment and Plan:   The following treatment plan was discussed     1. Moderate episode of recurrent major depressive disorder (HCC)  - Well-controlled.  Continue Wellbutrin 100 mg twice daily.  - Discussed with patient regarding the use and side effects of medication as well as black box warning of suicidality risk with medication. Patient verbally understands. Recommend to call 911 or go to ER if patient has suicidal ideation or plan.  - Comp Metabolic Panel; Future    2. Hypercholesterolemia  - Improved.  " She still has slightly high triglyceride and low HDL at 38.  Continue to control with diet and exercise.  Recheck lab 1-2 weeks before next follow up visit.  - Reviewed the risks and benefits of treatment and potential side effects of medication.  - Advised to eat low fat, low carbohydrate and high fiber diet as well as do cardio physical exercise regularly.  - Comp Metabolic Panel; Future  - Lipid Profile; Future    3. Vitamin D insufficiency  - Improved but not at goal yet.  Discussed to increase the dose of vitamin D from 2000 units daily to 5000 units daily.  - VITAMIN D,25 HYDROXY; Future    4. Mild intermittent asthma without complication  - Well-controlled. Continue current regimens, albuterol inhaler once or twice a month as needed. Recheck lab 1-2 weeks before next follow up visit.    5. Health Maintenance   - Counseling to receive pneumonia vaccine.  Patient would like to postpone her pneumonia vaccine   to March 2019 when she has vacation.  Patient was referred to [facility] for colon cancer screening as she had colonoscopy 10 years ago with [facility].  She has not scheduled with [facility] yet.  Reprinted contact information of [facility] for patient.  Patient already has appointment with her gynecologist for pelvic exam and Pap smear.  She has normal mammogram on 1/10/20.    Followup: Return in about 6 months (around 8/27/2019), or if symptoms worsen or fail to improve, for Hyperlipidemia, Depression, Asthma, Vitamin D insufficiency, Lab review.      Please note that this dictation was created using voice recognition software. I have made every reasonable attempt to correct obvious errors, but I expect that there may have unintended errors in text, spelling, punctuation, or grammar that I did not discover.

## 2019-03-12 ENCOUNTER — HOSPITAL ENCOUNTER (OUTPATIENT)
Dept: LAB | Facility: MEDICAL CENTER | Age: 49
End: 2019-03-12
Attending: OBSTETRICS & GYNECOLOGY
Payer: COMMERCIAL

## 2019-03-12 PROCEDURE — 88175 CYTOPATH C/V AUTO FLUID REDO: CPT

## 2019-03-12 PROCEDURE — 87491 CHLMYD TRACH DNA AMP PROBE: CPT

## 2019-03-12 PROCEDURE — 87591 N.GONORRHOEAE DNA AMP PROB: CPT

## 2019-03-13 LAB — AMBIGUOUS DTTM AMBI4: NORMAL

## 2019-03-15 LAB
C TRACH DNA GENITAL QL NAA+PROBE: NEGATIVE
CYTOLOGY REG CYTOL: NORMAL
N GONORRHOEA DNA GENITAL QL NAA+PROBE: NEGATIVE
SPECIMEN SOURCE: NORMAL

## 2019-03-22 ENCOUNTER — TELEPHONE (OUTPATIENT)
Dept: MEDICAL GROUP | Age: 49
End: 2019-03-22

## 2019-03-22 DIAGNOSIS — Z23 NEED FOR VACCINATION: ICD-10-CM

## 2019-03-22 NOTE — TELEPHONE ENCOUNTER
Patient is on the MA Schedule 3/25/19 for PPSV 23 vaccine/injection.    SPECIFIC Action To Be Taken: Orders pending, please sign.

## 2019-03-25 ENCOUNTER — NON-PROVIDER VISIT (OUTPATIENT)
Dept: MEDICAL GROUP | Age: 49
End: 2019-03-25
Payer: COMMERCIAL

## 2019-03-25 ENCOUNTER — HOSPITAL ENCOUNTER (OUTPATIENT)
Dept: LAB | Facility: MEDICAL CENTER | Age: 49
End: 2019-03-25
Attending: OBSTETRICS & GYNECOLOGY
Payer: COMMERCIAL

## 2019-03-25 DIAGNOSIS — Z23 NEED FOR VACCINATION: ICD-10-CM

## 2019-03-25 LAB
ESTRADIOL SERPL-MCNC: <20 PG/ML
FSH SERPL-ACNC: 0.9 MIU/ML
LH SERPL-ACNC: <0.2 IU/L
PROGEST SERPL-MCNC: 0.13 NG/ML

## 2019-03-25 PROCEDURE — 83002 ASSAY OF GONADOTROPIN (LH): CPT

## 2019-03-25 PROCEDURE — 82670 ASSAY OF TOTAL ESTRADIOL: CPT

## 2019-03-25 PROCEDURE — 84144 ASSAY OF PROGESTERONE: CPT

## 2019-03-25 PROCEDURE — 83001 ASSAY OF GONADOTROPIN (FSH): CPT

## 2019-03-25 PROCEDURE — 90471 IMMUNIZATION ADMIN: CPT | Performed by: INTERNAL MEDICINE

## 2019-03-25 PROCEDURE — 36415 COLL VENOUS BLD VENIPUNCTURE: CPT

## 2019-03-25 PROCEDURE — 82671 ASSAY OF ESTROGENS: CPT

## 2019-03-25 PROCEDURE — 90732 PPSV23 VACC 2 YRS+ SUBQ/IM: CPT | Performed by: INTERNAL MEDICINE

## 2019-03-25 NOTE — PROGRESS NOTES
"Gladis Tovar is a 48 y.o. female here for a non-provider visit for:   PNEUMOVAX (PPSV23)    Reason for immunization: continue or complete series started at the office  Immunization records indicate need for vaccine: Yes, confirmed with Epic  Minimum interval has been met for this vaccine: Yes  ABN completed: Yes    Order and dose verified by: AMAYA MINER  VIS Dated  2015 was given to patient: Yes  All IAC Questionnaire questions were answered \"No.\"    Patient tolerated injection and no adverse effects were observed or reported: Yes    Pt scheduled for next dose in series: Not Indicated  "

## 2019-03-29 LAB
ESTRADIOL SERPL HS-MCNC: 6.5 PG/ML
ESTROGEN SERPL CALC-MCNC: 36.5 PG/ML
ESTRONE SERPL-MCNC: 30 PG/ML

## 2019-05-17 DIAGNOSIS — Z01.812 PRE-OPERATIVE LABORATORY EXAMINATION: ICD-10-CM

## 2019-05-17 LAB
BASOPHILS # BLD AUTO: 0.5 % (ref 0–1.8)
BASOPHILS # BLD: 0.05 K/UL (ref 0–0.12)
EOSINOPHIL # BLD AUTO: 0 K/UL (ref 0–0.51)
EOSINOPHIL NFR BLD: 0 % (ref 0–6.9)
ERYTHROCYTE [DISTWIDTH] IN BLOOD BY AUTOMATED COUNT: 41.6 FL (ref 35.9–50)
HCG SERPL QL: NEGATIVE
HCT VFR BLD AUTO: 42 % (ref 37–47)
HGB BLD-MCNC: 13.7 G/DL (ref 12–16)
IMM GRANULOCYTES # BLD AUTO: 0.06 K/UL (ref 0–0.11)
IMM GRANULOCYTES NFR BLD AUTO: 0.6 % (ref 0–0.9)
LYMPHOCYTES # BLD AUTO: 3.61 K/UL (ref 1–4.8)
LYMPHOCYTES NFR BLD: 35.8 % (ref 22–41)
MCH RBC QN AUTO: 28 PG (ref 27–33)
MCHC RBC AUTO-ENTMCNC: 32.6 G/DL (ref 33.6–35)
MCV RBC AUTO: 85.7 FL (ref 81.4–97.8)
MONOCYTES # BLD AUTO: 0.84 K/UL (ref 0–0.85)
MONOCYTES NFR BLD AUTO: 8.3 % (ref 0–13.4)
NEUTROPHILS # BLD AUTO: 5.51 K/UL (ref 2–7.15)
NEUTROPHILS NFR BLD: 54.8 % (ref 44–72)
NRBC # BLD AUTO: 0 K/UL
NRBC BLD-RTO: 0 /100 WBC
PLATELET # BLD AUTO: 232 K/UL (ref 164–446)
PMV BLD AUTO: 9.8 FL (ref 9–12.9)
RBC # BLD AUTO: 4.9 M/UL (ref 4.2–5.4)
WBC # BLD AUTO: 10.1 K/UL (ref 4.8–10.8)

## 2019-05-17 PROCEDURE — 36415 COLL VENOUS BLD VENIPUNCTURE: CPT

## 2019-05-17 PROCEDURE — 85025 COMPLETE CBC W/AUTO DIFF WBC: CPT

## 2019-05-17 PROCEDURE — 84703 CHORIONIC GONADOTROPIN ASSAY: CPT

## 2019-05-22 NOTE — H&P
DATE OF PROCEDURE:  2019    PREOPERATIVE DIAGNOSES:  1.  Abnormal uterine bleeding.  2.  Possible endometrial polyp or submucosal fibroid.  3.  Desires tubal sterilization.  4.  Desires conservative surgical management.    PLANNED PROCEDURE:  Examination under anesthesia, pelviscopy, bilateral   salpingectomy, diagnostic and operative hysteroscopy, excision of endometrial   polyp or submucosal fibroid with MyoSure and endometrial curettage.    HISTORY OF PRESENT ILLNESS:  The patient is a 48-year-old para 1-2-3-2   sexually active woman with an unsure last menstrual period, who has abnormal   uterine bleeding and who desires permanent sterilization.  The patient   underwent a pelvic ultrasound on 2019, which demonstrated that her   uterus measured 7.3x4.8x4.7 cm.  Her endometrial stripe measured 1.5 cm.    There were multiple small echogenic foci seen in the endometrium and it was   unclear if these represented endometrial polyps or submucosal fibroids.  Her   uterus is retroverted.  Her bilateral ovaries were not visualized.    The patient is also multiparous and desires permanent sterilization.  She has   a history of vaginal deliveries, however, several of them were .    The risks, benefits and alternatives of an examination under anesthesia,   pelviscopy, bilateral salpingectomy, diagnostic and operative hysteroscopy,   excision of endometrial polyp or submucosal fibroid with the MyoSure and   endometrial curettage were discussed with the patient and she agrees to   proceed.    PAST MEDICAL HISTORY:  Depression.    PAST SURGICAL HISTORY:  Breast surgery, laparoscopic appendectomy,   tonsillectomy, D and C.    OBSTETRIC HISTORY:  NSVDs x5, SABs x2.  She had 2 NSVDs at 24 weeks and both   of those infants passed away.    ALLERGIES:  TO CODEINE.    SOCIAL HISTORY:  She is .  She denies alcohol, tobacco, or drugs of   abuse.    REVIEW OF SYSTEMS:  Negative.    PHYSICAL EXAMINATION:  VITAL  SIGNS:  Blood pressure 116/69, pulse 89, temperature 98.2.  GENERAL:  Alert, awake and oriented x3, in no acute distress.  LUNGS:  Clear to auscultation bilaterally.  HEART:  Regular rate and rhythm.  No murmurs, rubs or gallops.  S1, S2 intact.  GENITOURINARY:  Deferred.  EXTREMITIES:  No clubbing, cyanosis or edema.    PREOPERATIVE LABORATORY STUDIES:  White count 10.1, hemoglobin 13.7,   hematocrit 42, platelets 232.  Qualitative beta-hCG is negative.    ASSESSMENT AND PLAN:  A 48-year-old para 1-2-3-2 sexually active woman with an   unsure last menstrual period, who has abnormal uterine bleeding and a   thickened endometrial stripe on transvaginal ultrasound, possible endometrial   polyp or submucosal fibroid, who desires permanent sterilization.  Risks,   benefits and alternatives of an examination under anesthesia, pelviscopy,   bilateral salpingectomy, diagnostic and operative hysteroscopy, excision of   endometrial polyp or submucosal fibroid with the MyoSure and endometrial   curettage were discussed with the patient and she agrees to proceed.       ____________________________________     WENDY BIRMINGHAM MD    HTA / N[facility]    DD:  05/22/2019 14:20:06  DT:  05/22/2019 14:35:39    D#:  [id number]  Job#:  [id number]

## 2019-05-23 ENCOUNTER — HOSPITAL ENCOUNTER (OUTPATIENT)
Facility: MEDICAL CENTER | Age: 49
End: 2019-05-23
Attending: OBSTETRICS & GYNECOLOGY | Admitting: OBSTETRICS & GYNECOLOGY
Payer: COMMERCIAL

## 2019-05-23 ENCOUNTER — ANESTHESIA (OUTPATIENT)
Dept: SURGERY | Facility: MEDICAL CENTER | Age: 49
End: 2019-05-23
Payer: COMMERCIAL

## 2019-05-23 ENCOUNTER — ANESTHESIA EVENT (OUTPATIENT)
Dept: SURGERY | Facility: MEDICAL CENTER | Age: 49
End: 2019-05-23
Payer: COMMERCIAL

## 2019-05-23 VITALS
WEIGHT: 242.95 LBS | HEART RATE: 100 BPM | OXYGEN SATURATION: 93 % | HEIGHT: 65 IN | TEMPERATURE: 97.4 F | BODY MASS INDEX: 40.48 KG/M2 | RESPIRATION RATE: 16 BRPM | SYSTOLIC BLOOD PRESSURE: 128 MMHG | DIASTOLIC BLOOD PRESSURE: 63 MMHG

## 2019-05-23 DIAGNOSIS — G89.18 PAIN FOLLOWING SURGERY OR PROCEDURE: ICD-10-CM

## 2019-05-23 LAB — PATHOLOGY CONSULT NOTE: NORMAL

## 2019-05-23 PROCEDURE — 700101 HCHG RX REV CODE 250: Performed by: ANESTHESIOLOGY

## 2019-05-23 PROCEDURE — 700111 HCHG RX REV CODE 636 W/ 250 OVERRIDE (IP): Performed by: ANESTHESIOLOGY

## 2019-05-23 PROCEDURE — A4338 INDWELLING CATHETER LATEX: HCPCS | Performed by: OBSTETRICS & GYNECOLOGY

## 2019-05-23 PROCEDURE — 88302 TISSUE EXAM BY PATHOLOGIST: CPT

## 2019-05-23 PROCEDURE — 501394 HCHG SOLUTION SORBITOL 3% 3000ML BAG: Performed by: OBSTETRICS & GYNECOLOGY

## 2019-05-23 PROCEDURE — A9270 NON-COVERED ITEM OR SERVICE: HCPCS | Performed by: ANESTHESIOLOGY

## 2019-05-23 PROCEDURE — 502240 HCHG MISC OR SUPPLY RC 0272: Performed by: OBSTETRICS & GYNECOLOGY

## 2019-05-23 PROCEDURE — 502587 HCHG PACK, D&C: Performed by: OBSTETRICS & GYNECOLOGY

## 2019-05-23 PROCEDURE — 160041 HCHG SURGERY MINUTES - EA ADDL 1 MIN LEVEL 4: Performed by: OBSTETRICS & GYNECOLOGY

## 2019-05-23 PROCEDURE — 500886 HCHG PACK, LAPAROSCOPY: Performed by: OBSTETRICS & GYNECOLOGY

## 2019-05-23 PROCEDURE — 160036 HCHG PACU - EA ADDL 30 MINS PHASE I: Performed by: OBSTETRICS & GYNECOLOGY

## 2019-05-23 PROCEDURE — 700105 HCHG RX REV CODE 258: Performed by: OBSTETRICS & GYNECOLOGY

## 2019-05-23 PROCEDURE — A6402 STERILE GAUZE <= 16 SQ IN: HCPCS | Performed by: OBSTETRICS & GYNECOLOGY

## 2019-05-23 PROCEDURE — 160046 HCHG PACU - 1ST 60 MINS PHASE II: Performed by: OBSTETRICS & GYNECOLOGY

## 2019-05-23 PROCEDURE — 160048 HCHG OR STATISTICAL LEVEL 1-5: Performed by: OBSTETRICS & GYNECOLOGY

## 2019-05-23 PROCEDURE — 700102 HCHG RX REV CODE 250 W/ 637 OVERRIDE(OP): Performed by: ANESTHESIOLOGY

## 2019-05-23 PROCEDURE — 160002 HCHG RECOVERY MINUTES (STAT): Performed by: OBSTETRICS & GYNECOLOGY

## 2019-05-23 PROCEDURE — 500002 HCHG ADHESIVE, DERMABOND: Performed by: OBSTETRICS & GYNECOLOGY

## 2019-05-23 PROCEDURE — 88305 TISSUE EXAM BY PATHOLOGIST: CPT

## 2019-05-23 PROCEDURE — 501582 HCHG TROCAR, THRD BLADED: Performed by: OBSTETRICS & GYNECOLOGY

## 2019-05-23 PROCEDURE — 160029 HCHG SURGERY MINUTES - 1ST 30 MINS LEVEL 4: Performed by: OBSTETRICS & GYNECOLOGY

## 2019-05-23 PROCEDURE — 160035 HCHG PACU - 1ST 60 MINS PHASE I: Performed by: OBSTETRICS & GYNECOLOGY

## 2019-05-23 PROCEDURE — 502704 HCHG DEVICE, LIGASURE IMPACT: Performed by: OBSTETRICS & GYNECOLOGY

## 2019-05-23 PROCEDURE — 160009 HCHG ANES TIME/MIN: Performed by: OBSTETRICS & GYNECOLOGY

## 2019-05-23 PROCEDURE — 501838 HCHG SUTURE GENERAL: Performed by: OBSTETRICS & GYNECOLOGY

## 2019-05-23 PROCEDURE — 160025 RECOVERY II MINUTES (STATS): Performed by: OBSTETRICS & GYNECOLOGY

## 2019-05-23 PROCEDURE — A6404 STERILE GAUZE > 48 SQ IN: HCPCS | Performed by: OBSTETRICS & GYNECOLOGY

## 2019-05-23 RX ORDER — ONDANSETRON 2 MG/ML
4 INJECTION INTRAMUSCULAR; INTRAVENOUS
Status: DISCONTINUED | OUTPATIENT
Start: 2019-05-23 | End: 2019-05-23 | Stop reason: HOSPADM

## 2019-05-23 RX ORDER — SIMETHICONE 80 MG
80 TABLET,CHEWABLE ORAL EVERY 8 HOURS PRN
Status: DISCONTINUED | OUTPATIENT
Start: 2019-05-23 | End: 2019-05-23 | Stop reason: HOSPADM

## 2019-05-23 RX ORDER — HALOPERIDOL 5 MG/ML
1 INJECTION INTRAMUSCULAR
Status: DISCONTINUED | OUTPATIENT
Start: 2019-05-23 | End: 2019-05-23 | Stop reason: HOSPADM

## 2019-05-23 RX ORDER — DEXAMETHASONE SODIUM PHOSPHATE 4 MG/ML
INJECTION, SOLUTION INTRA-ARTICULAR; INTRALESIONAL; INTRAMUSCULAR; INTRAVENOUS; SOFT TISSUE PRN
Status: DISCONTINUED | OUTPATIENT
Start: 2019-05-23 | End: 2019-05-23 | Stop reason: SURG

## 2019-05-23 RX ORDER — HYDROMORPHONE HYDROCHLORIDE 1 MG/ML
0.1 INJECTION, SOLUTION INTRAMUSCULAR; INTRAVENOUS; SUBCUTANEOUS
Status: DISCONTINUED | OUTPATIENT
Start: 2019-05-23 | End: 2019-05-23 | Stop reason: HOSPADM

## 2019-05-23 RX ORDER — ACETAMINOPHEN 500 MG
1000 TABLET ORAL ONCE
Status: COMPLETED | OUTPATIENT
Start: 2019-05-23 | End: 2019-05-23

## 2019-05-23 RX ORDER — MEPERIDINE HYDROCHLORIDE 25 MG/ML
12.5 INJECTION INTRAMUSCULAR; INTRAVENOUS; SUBCUTANEOUS
Status: DISCONTINUED | OUTPATIENT
Start: 2019-05-23 | End: 2019-05-23 | Stop reason: HOSPADM

## 2019-05-23 RX ORDER — OXYCODONE HCL 5 MG/5 ML
5 SOLUTION, ORAL ORAL
Status: COMPLETED | OUTPATIENT
Start: 2019-05-23 | End: 2019-05-23

## 2019-05-23 RX ORDER — CEFAZOLIN SODIUM 1 G/3ML
INJECTION, POWDER, FOR SOLUTION INTRAMUSCULAR; INTRAVENOUS PRN
Status: DISCONTINUED | OUTPATIENT
Start: 2019-05-23 | End: 2019-05-23 | Stop reason: SURG

## 2019-05-23 RX ORDER — PROMETHAZINE HYDROCHLORIDE 25 MG/1
12.5 SUPPOSITORY RECTAL EVERY 4 HOURS PRN
Status: DISCONTINUED | OUTPATIENT
Start: 2019-05-23 | End: 2019-05-23 | Stop reason: HOSPADM

## 2019-05-23 RX ORDER — BUPIVACAINE HYDROCHLORIDE AND EPINEPHRINE 2.5; 5 MG/ML; UG/ML
INJECTION, SOLUTION EPIDURAL; INFILTRATION; INTRACAUDAL; PERINEURAL
Status: DISCONTINUED
Start: 2019-05-23 | End: 2019-05-23 | Stop reason: HOSPADM

## 2019-05-23 RX ORDER — HYDROMORPHONE HYDROCHLORIDE 1 MG/ML
0.4 INJECTION, SOLUTION INTRAMUSCULAR; INTRAVENOUS; SUBCUTANEOUS
Status: DISCONTINUED | OUTPATIENT
Start: 2019-05-23 | End: 2019-05-23 | Stop reason: HOSPADM

## 2019-05-23 RX ORDER — ONDANSETRON 2 MG/ML
INJECTION INTRAMUSCULAR; INTRAVENOUS PRN
Status: DISCONTINUED | OUTPATIENT
Start: 2019-05-23 | End: 2019-05-23 | Stop reason: SURG

## 2019-05-23 RX ORDER — SODIUM CHLORIDE, SODIUM LACTATE, POTASSIUM CHLORIDE, CALCIUM CHLORIDE 600; 310; 30; 20 MG/100ML; MG/100ML; MG/100ML; MG/100ML
INJECTION, SOLUTION INTRAVENOUS CONTINUOUS
Status: DISCONTINUED | OUTPATIENT
Start: 2019-05-23 | End: 2019-05-23 | Stop reason: HOSPADM

## 2019-05-23 RX ORDER — KETOROLAC TROMETHAMINE 30 MG/ML
INJECTION, SOLUTION INTRAMUSCULAR; INTRAVENOUS PRN
Status: DISCONTINUED | OUTPATIENT
Start: 2019-05-23 | End: 2019-05-23 | Stop reason: SURG

## 2019-05-23 RX ORDER — OXYCODONE HCL 5 MG/5 ML
10 SOLUTION, ORAL ORAL
Status: COMPLETED | OUTPATIENT
Start: 2019-05-23 | End: 2019-05-23

## 2019-05-23 RX ORDER — HYDROMORPHONE HYDROCHLORIDE 1 MG/ML
0.2 INJECTION, SOLUTION INTRAMUSCULAR; INTRAVENOUS; SUBCUTANEOUS
Status: DISCONTINUED | OUTPATIENT
Start: 2019-05-23 | End: 2019-05-23 | Stop reason: HOSPADM

## 2019-05-23 RX ORDER — BUPIVACAINE HYDROCHLORIDE AND EPINEPHRINE 2.5; 5 MG/ML; UG/ML
INJECTION, SOLUTION INFILTRATION; PERINEURAL
Status: DISCONTINUED | OUTPATIENT
Start: 2019-05-23 | End: 2019-05-23 | Stop reason: HOSPADM

## 2019-05-23 RX ORDER — OXYCODONE HYDROCHLORIDE AND ACETAMINOPHEN 5; 325 MG/1; MG/1
1-2 TABLET ORAL EVERY 4 HOURS PRN
Qty: 30 TAB | Refills: 0 | Status: SHIPPED | OUTPATIENT
Start: 2019-05-23 | End: 2019-05-30

## 2019-05-23 RX ADMIN — FENTANYL CITRATE 25 MCG: 50 INJECTION INTRAMUSCULAR; INTRAVENOUS at 15:09

## 2019-05-23 RX ADMIN — SODIUM CHLORIDE, POTASSIUM CHLORIDE, SODIUM LACTATE AND CALCIUM CHLORIDE: 600; 310; 30; 20 INJECTION, SOLUTION INTRAVENOUS at 13:09

## 2019-05-23 RX ADMIN — CEFAZOLIN 2 G: 330 INJECTION, POWDER, FOR SOLUTION INTRAMUSCULAR; INTRAVENOUS at 13:16

## 2019-05-23 RX ADMIN — MIDAZOLAM HYDROCHLORIDE 2 MG: 1 INJECTION, SOLUTION INTRAMUSCULAR; INTRAVENOUS at 13:06

## 2019-05-23 RX ADMIN — OXYCODONE HYDROCHLORIDE 10 MG: 5 SOLUTION ORAL at 15:09

## 2019-05-23 RX ADMIN — PROPOFOL 200 MG: 10 INJECTION, EMULSION INTRAVENOUS at 13:09

## 2019-05-23 RX ADMIN — ACETAMINOPHEN 1000 MG: 500 TABLET ORAL at 11:35

## 2019-05-23 RX ADMIN — FENTANYL CITRATE 50 MCG: 50 INJECTION, SOLUTION INTRAMUSCULAR; INTRAVENOUS at 14:28

## 2019-05-23 RX ADMIN — FENTANYL CITRATE 150 MCG: 50 INJECTION, SOLUTION INTRAMUSCULAR; INTRAVENOUS at 13:09

## 2019-05-23 RX ADMIN — DEXAMETHASONE SODIUM PHOSPHATE 8 MG: 4 INJECTION, SOLUTION INTRA-ARTICULAR; INTRALESIONAL; INTRAMUSCULAR; INTRAVENOUS; SOFT TISSUE at 13:24

## 2019-05-23 RX ADMIN — ONDANSETRON 4 MG: 2 INJECTION INTRAMUSCULAR; INTRAVENOUS at 14:26

## 2019-05-23 RX ADMIN — SODIUM CHLORIDE, POTASSIUM CHLORIDE, SODIUM LACTATE AND CALCIUM CHLORIDE: 600; 310; 30; 20 INJECTION, SOLUTION INTRAVENOUS at 11:35

## 2019-05-23 RX ADMIN — ROCURONIUM BROMIDE 50 MG: 10 INJECTION, SOLUTION INTRAVENOUS at 13:09

## 2019-05-23 RX ADMIN — FENTANYL CITRATE 25 MCG: 50 INJECTION INTRAMUSCULAR; INTRAVENOUS at 15:14

## 2019-05-23 RX ADMIN — HYDROMORPHONE HYDROCHLORIDE 0.4 MG: 1 INJECTION, SOLUTION INTRAMUSCULAR; INTRAVENOUS; SUBCUTANEOUS at 15:41

## 2019-05-23 RX ADMIN — KETOROLAC TROMETHAMINE 30 MG: 30 INJECTION, SOLUTION INTRAMUSCULAR at 14:26

## 2019-05-23 RX ADMIN — HYDROMORPHONE HYDROCHLORIDE 0.2 MG: 1 INJECTION, SOLUTION INTRAMUSCULAR; INTRAVENOUS; SUBCUTANEOUS at 15:25

## 2019-05-23 RX ADMIN — HYDROMORPHONE HYDROCHLORIDE 0.2 MG: 1 INJECTION, SOLUTION INTRAMUSCULAR; INTRAVENOUS; SUBCUTANEOUS at 15:17

## 2019-05-23 RX ADMIN — LIDOCAINE HYDROCHLORIDE 80 MG: 20 INJECTION, SOLUTION INFILTRATION; PERINEURAL at 13:09

## 2019-05-23 ASSESSMENT — PAIN SCALES - GENERAL: PAIN_LEVEL: 0

## 2019-05-23 NOTE — ANESTHESIA PROCEDURE NOTES
Airway  Date/Time: 5/23/2019 1:18 PM  Performed by: MAURICIO CRISOSTOMO  Authorized by: MAURICIO CRISOSTOMO     Location:  OR  Urgency:  Elective  Indications for Airway Management:  Anesthesia  Spontaneous Ventilation: absent    Sedation Level:  Deep  Preoxygenated: Yes    Patient Position:  Sniffing  Mask Difficulty Assessment:  1 - vent by mask  Final Airway Type:  Endotracheal airway  Final Endotracheal Airway:  ETT  Cuffed: Yes    Technique Used for Successful ETT Placement:  Direct laryngoscopy  Insertion Site:  Oral  Blade Type:  Randle  Laryngoscope Blade/Videolaryngoscope Blade Size:  2  ETT Size (mm):  6.5  Measured from:  Teeth  ETT to Teeth (cm):  22  Placement Verified by: auscultation and capnometry    Cormack-Lehane Classification:  Grade IIb - view of arytenoids or posterior of glottis only  Number of Attempts at Approach:  1

## 2019-05-23 NOTE — OP REPORT
DATE OF SERVICE:  05/23/2019    PREOPERATIVE DIAGNOSES:  1.  Abnormal uterine bleeding.  2.  Possible endometrial polyp or submucosal fibroid.  3.  Desires tubal sterilization.  4.  Desires conservative surgical management.    POSTOPERATIVE DIAGNOSES:  1.  Abnormal uterine bleeding.  2.  Possible endometrial polyp or submucosal fibroid.  3.  Desires tubal sterilization.  4.  Desires conservative surgical management.  5.  No evidence of endometrial polyp or submucosal fibroid.  Pathology is   pending.    PROCEDURE PERFORMED:  Examination under anesthesia, pelviscopy, bilateral   salpingectomy, diagnostic and operative hysteroscopy, endometrial curettage   with the MyoSure and sharp uterine curettage.    SURGEON:  Edwina Miranda MD    ANESTHESIOLOGIST:  Juan Mohan MD    ANESTHESIA:  General endotracheal tube anesthesia and local anesthetic.    SPECIMEN:  Bilateral fallopian tubes and endometrial curettings.    ESTIMATED BLOOD LOSS:  Less than 20 mL    FINDINGS:  On examination under anesthesia, her cervix is closed, without   palpable masses.  Her uterus is retroverted and enlarged.  There are no   adnexal masses palpated.    LAPAROSCOPIC FINDINGS:  Normal pelvis.  Normal bilateral tubes and ovaries.    Normal uterus.  No evidence of endometriosis or pelvic adhesive disease.    HYSTEROSCOPIC FINDINGS:  Bilateral tubal ostia are visualized.  No evidence of   endometrial polyp or submucosal fibroid.  Endometrium does not appear plush,   but does appear vascular.  No abnormal masses seen.    COMPLICATIONS:  None apparent.    INDICATIONS FOR THE PROCEDURE:  The patient is a 48-year-old para 1-2-3-2,   sexually active woman with an unsure last menstrual period who has abnormal   uterine bleeding and who desires permanent sterilization.    DETAILS OF THE PROCEDURE:  The patient was taken to the operating room where   she underwent general endotracheal tube anesthesia.  She was then placed in   the dorsal  lithotomy position in the [facility].  An examination   under anesthesia was performed and the findings are noted as above.  The   patient was then prepped and draped in the dorsal lithotomy position.  A Martines   catheter was placed in her urinary bladder.    Medium Graves speculum was inserted into the vagina.  The cervix was   visualized.  The anterior lip of the cervix was grasped with an Allis clamp.    The endocervix was dilated to allow a 6 Jelly manipulator to be placed within   the uterine cavity.  The Jelly manipulator was placed without difficulty.  The   Allis clamp and speculum were removed.    Attention was turned to the laparoscopic portion of the procedure.  A 10 mm   infraumbilical skin incision was made with the scalpel after the instillation   of 0.25% Marcaine with epinephrine.  The incision was carried down to the   level of the fascia.  The fascia was grasped with Kocher clamps, elevated and   incised with Kapadia scissors.  The incision was extended laterally with Kapadia   scissors.  Either side of the fascial incision was sutured with 0 Vicryl as   stay sutures.  The S retractors were placed within the incision.  The   peritoneum was identified and entered bluntly.  The S retractors were placed   through all of these incisions and intra-abdominal entry was confirmed as   there was bowel and omentum visualized.  The patient was placed in   Trendelenburg.  The Gianni trocar was inserted under direct visualization.    The CO2 gas was connected and the opening pressure was 8 mmHg.  The   laparoscope was inserted and the patient's pelvis and upper abdomen were   explored and the findings are noted as above.  A second trocar site was   identified 3 fingerbreadths above the pubic symphysis.  A 5 mm skin incision   was made with the scalpel after the instillation of 0.25% Marcaine with   epinephrine.  The 5 mm trocar was inserted under direct visualization.  The   bilateral fallopian tubes and  ovaries were visualized.  Secondary to her   adiposity, I was unable to adequately visualize the bilateral ureters.    The right fallopian tube was grasped with the Prestige clamp, elevated and   brought to the midline.  There was no evidence of ureter proximal to the   fallopian tube.  The LigaSure was used to cauterize and then incise the   fallopian tube along its length to a level 2 cm from the cornual region of the   uterus.  The fallopian tube was cauterized and incised and then sent to   pathology.    The pedicle was examined and found to be hemostatic.  The right ovary appeared   within normal limits.    The left ovary and fallopian tube were examined and identified.  The left   fallopian tube was grasped near the fimbriated end and brought to the midline.    The LigaSure was used to cauterize the fallopian tube along its length to a   level approximately 2 cm from the cornual region of the uterus.  The fallopian   tube was cauterized and then incised and sent to pathology.  The pedicle was   examined and found to be hemostatic.    There was no evidence of endometriosis implants or pelvic adhesive disease.    The trocars were removed under direct visualization.  The CO2 gas was released   from the patient's abdomen.  The fascia was reapproximated using the stay   sutures as well as an additional figure-of-X of 0 Vicryl suture.  The   subcutaneous tissues were reapproximated with 2-0 Vicryl and the skin was   closed with 4-0 Monocryl.  The 5 mm trocar site skin was closed with 4-0   Monocryl.    The wounds were dressed with benzoin, Steri-Strips and a pressure dressing.    Attention was turned to the hysteroscopic portion of the procedure.  The Jelly   manipulator was removed.  The medium Graves speculum was inserted into the   vagina.  The cervix was visualized.  The anterior lip of the cervix was   grasped with an Allis clamp and the endocervix was dilated to allow the   MyoSure camera to be inserted.  The  MyoSure camera was inserted under direct   visualization and the findings are noted as above.  Using the MyoSure light,   endometrial curettings was performed.    The MyoSure camera and MyoSure device were removed under direct visualization.    The sharp curette was inserted and sharp curettage was performed.  The   specimens were sent to pathology.    The cervix was visualized and a small area of bleeding was made hemostatic   with silver nitrate.    The speculum was removed.  The Martines catheter was removed.  The patient was   taken out of dorsal lithotomy position.  She was then extubated and taken to   the PACU in stable condition.       ____________________________________     WENDY BIRMINGHAM MD    HTA / [facility]    DD:  05/23/2019 15:04:10  DT:  05/23/2019 16:40:37    D#:  [id number]  Job#:  [id number]

## 2019-05-23 NOTE — DISCHARGE INSTRUCTIONS
ACTIVITY: Rest and take it easy for the first 24 hours.  A responsible adult is recommended to remain with you during that time.  It is normal to feel sleepy.  We encourage you to not do anything that requires balance, judgment or coordination.    MILD FLU-LIKE SYMPTOMS ARE NORMAL. YOU MAY EXPERIENCE GENERALIZED MUSCLE ACHES, THROAT IRRITATION, HEADACHE AND/OR SOME NAUSEA.    FOR 24 HOURS DO NOT:  Drive, operate machinery or run household appliances.  Drink beer or alcoholic beverages.   Make important decisions or sign legal documents.    SPECIAL INSTRUCTIONS: Call for a temperature >100.4, heavy vaginal bleeding, foul smelling discharge, or if  incision oozes blood, pus, or fluid.  No driving for 2 weeks or while on narcotics, no lifting >20 pounds for 4 weeks, and pelvic rest (no tampons/douche/sexual intercourse) for 6 weeks.  Call for signs/symptos of DVT (deep vein thrombosis)/PE(pulmonary embolus).  Ambulate three times a day.    DIET: To avoid nausea, slowly advance diet as tolerated, avoiding spicy or greasy foods for the first day.  Add more substantial food to your diet according to your physician's instructions.  Babies can be fed formula or breast milk as soon as they are hungry.  INCREASE FLUIDS AND FIBER TO AVOID CONSTIPATION.    SURGICAL DRESSING/BATHING: May remove dressings in 3 days, may shower tomorrow, no tub baths/hot tubs/swimming.     FOLLOW-UP APPOINTMENT:  A follow-up appointment should be arranged with your doctor; call to schedule.    You should CALL YOUR PHYSICIAN if you develop:  Fever greater than 101 degrees F.  Pain not relieved by medication, or persistent nausea or vomiting.  Excessive bleeding (blood soaking through dressing) or unexpected drainage from the wound.  Extreme redness or swelling around the incision site, drainage of pus or foul smelling drainage.  Inability to urinate or empty your bladder within 8 hours.  Problems with breathing or chest pain.    You should call  [phone] if you develop problems with breathing or chest pain.  If you are unable to contact your doctor or surgical center, you should go to the nearest emergency room or urgent care center.  Physician's telephone #: DR. Mendieta [phone]    If any questions arise, call your doctor.  If your doctor is not available, please feel free to call the Surgical Center at [phone].  The Center is open Monday through Friday from 7AM to 7PM.  You can also call the HEALTH HOTLINE open 24 hours/day, 7 days/week and speak to a nurse at [phone], or toll free at [phone].    A registered nurse may call you a few days after your surgery to see how you are doing after your procedure.    MEDICATIONS: Resume taking daily medication.  Take prescribed pain medication with food.  If no medication is prescribed, you may take non-aspirin pain medication if needed.  PAIN MEDICATION CAN BE VERY CONSTIPATING.  Take a stool softener or laxative such as senokot, pericolace, or milk of magnesia if needed.    Prescription given for percocet.  Last pain medication given at 3:09 PM.    If your physician has prescribed pain medication that includes Acetaminophen (Tylenol), do not take additional Acetaminophen (Tylenol) while taking the prescribed medication.    Depression / Suicide Risk    As you are discharged from this [facility] facility, it is important to learn how to keep safe from harming yourself.    Recognize the warning signs:  · Abrupt changes in personality, positive or negative- including increase in energy   · Giving away possessions  · Change in eating patterns- significant weight changes-  positive or negative  · Change in sleeping patterns- unable to sleep or sleeping all the time   · Unwillingness or inability to communicate  · Depression  · Unusual sadness, discouragement and loneliness  · Talk of wanting to die  · Neglect of personal appearance   · Rebelliousness- reckless behavior  · Withdrawal from  people/activities they love  · Confusion- inability to concentrate     If you or a loved one observes any of these behaviors or has concerns about self-harm, here's what you can do:  · Talk about it- your feelings and reasons for harming yourself  · Remove any means that you might use to hurt yourself (examples: pills, rope, extension cords, firearm)  · Get professional help from the community (Mental Health, Substance Abuse, psychological counseling)  · Do not be alone:Call your Safe Contact- someone whom you trust who will be there for you.  · Call your local CRISIS HOTLINE 047-1823 or 560-995-8254  · Call your local Children's Mobile Crisis Response Team [place] (482) 356-8663 or www.[employer]  · Call the toll free National Suicide Prevention Hotlines   · National Suicide Prevention Lifeline 990-547-VSTO (3115)  · National [place] Line Network 800-SUICIDE (161-1160)      Deep Vein Thrombosis  A deep vein thrombosis (DVT) is a blood clot (thrombus) that usually occurs in a deep, larger vein of the lower leg or the pelvis, or in an upper extremity such as the arm. These are dangerous and can lead to serious and even life-threatening complications if the clot travels to the lungs.  A DVT can damage the valves in your leg veins so that instead of flowing upward, the blood pools in the lower leg. This is called post-thrombotic syndrome, and it can result in pain, swelling, discoloration, and sores on the leg.  What are the causes?  A DVT is caused by the formation of a blood clot in your leg, pelvis, or arm. Usually, several things contribute to the formation of blood clots. A clot may develop when:  · Your blood flow slows down.  · Your vein becomes damaged in some way.  · You have a condition that makes your blood clot more easily.  What increases the risk?  A DVT is more likely to develop in:  · People who are older, especially over 60 years of age.  · People who are overweight (obese).  · People who sit  or lie still for a long time, such as during long-distance travel (over 4 hours), bed rest, hospitalization, or during recovery from certain medical conditions like a stroke.  · People who do not engage in much physical activity (sedentary lifestyle).  · People who have chronic breathing disorders.  · People who have a personal or family history of blood clots or blood clotting disease.  · People who have peripheral vascular disease (PVD), diabetes, or some types of cancer.  · People who have heart disease, especially if the person had a recent heart attack or has congestive heart failure.  · People who have neurological diseases that affect the legs (leg paresis).  · People who have had a traumatic injury, such as breaking a hip or leg.  · People who have recently had major or lengthy surgery, especially on the hip, knee, or abdomen.  · People who have had a central line placed inside a large vein.  · People who take medicines that contain the hormone estrogen. These include birth control pills and hormone replacement therapy.  · Pregnancy or during childbirth or the postpartum period.  · Long plane flights (over 8 hours).  What are the signs or symptoms?     Symptoms of a DVT can include:  · Swelling of your leg or arm, especially if one side is much worse.  · Warmth and redness of your leg or arm, especially if one side is much worse.  · Pain in your arm or leg. If the clot is in your leg, symptoms may be more noticeable or worse when you stand or walk.  · A feeling of pins and needles, if the clot is in the arm.  The symptoms of a DVT that has traveled to the lungs (pulmonary embolism, PE) usually start suddenly and include:  · Shortness of breath while active or at rest.  · Coughing or coughing up blood or blood-tinged mucus.  · Chest pain that is often worse with deep breaths.  · Rapid or irregular heartbeat.  · Feeling light-headed or dizzy.  · Fainting.  · Feeling anxious.  · Sweating.  There may also be  pain and swelling in a leg if that is where the blood clot started.  These symptoms may represent a serious problem that is an emergency. Do not wait to see if the symptoms will go away. Get medical help right away. Call your local emergency services (911 in the [place] Do not drive yourself to the hospital.   How is this diagnosed?  Your health care provider will take a medical history and perform a physical exam. You may also have other tests, including:  · Blood tests to assess the clotting properties of your blood.  · Imaging tests, such as CT, ultrasound, MRI, X-ray, and other tests to see if you have clots anywhere in your body.

## 2019-05-23 NOTE — OR SURGEON
Immediate Post OP Note    PreOp Diagnosis:     1. Abnormal uterine bleeding.   2. Possible endometrial polyp or submucosal fibroid.   3. Desires tubal sterilization.   4. Desires conservative surgical management.    PostOp Diagnosis: As above.  No evidence of endometrial polyp or submucosal fibroid.    Procedure(s):  EXAM UNDER ANESTHESIA, PELVIS - Wound Class: Clean Contaminated  PELVISCOPY - Wound Class: Clean Contaminated  SALPINGECTOMY - Wound Class: Clean Contaminated  HYSTEROSCOPY, DIAGNOSTIC- ENDOMETRIAL CURETTAGE WITH MYOSURE - Wound Class: Clean Contaminated  HYSTEROSCOPY, WITH VIDEO IMAGING - Wound Class: Clean Contaminated  HYSTEROSCOPY, WITH TISSUE REMOVAL, USING HYSTEROSCOPIC ROTATING CUTTER BLADE - Wound Class: Clean Contaminated  SHARP UTERINE CURETTAGE    Surgeon(s):  Edwina Miranda M.D.    Anesthesiologist/Type of Anesthesia:  Anesthesiologist: Juan Mohan M.D./General and local anesthetic    Surgical Staff:  Circulator: Johanne Merino R.N.  Relief Circulator: Shasha Dai R.N.  Relief Scrub: Maggy Sutton  Scrub Person: Sidney Gamez    Specimens removed if any:  ID Type Source Tests Collected by Time Destination   A : bilateral fallopian tubes  Tissue Fallopian Tube PATHOLOGY SPECIMEN Edwina Miranda M.D. 5/23/2019 1348    B : endometrial curettings #1 Tissue Endometrium PATHOLOGY SPECIMEN Edwina Miranda M.D. 5/23/2019 1424    C : endometrial curettings #2 Tissue Endometrium PATHOLOGY SPECIMEN Edwina Miranda M.D. 5/23/2019 1427        Estimated Blood Loss: <20 cc    Findings: EUA: cervix is closed without palpable masses.  Uterus is retroverted and not enlarged.  No adnexal masses palpated.  Laparoscopic findings: normal bilateral tubes and ovaries.  Normal uterus.      Hysteroscopic findings: bilateral tubal ostia are visualized.  No evidence of endometrial polyp or submucosal fibroid.  Endometrium does not appear plush but does appear vascular.  No abnormal  masses seen.    Complications: None apparent.        5/23/2019 2:32 PM Edwina Miranda M.D.

## 2019-05-23 NOTE — OR NURSING
"1441 Patient arrived from OR on gurney. Report received from RN and Anesthesia. Patient's VS WNL, patient arousable, stable, breathing even/non labored. Dressings CDI, peripad on, valdes removed in OR.  1512 Pt c/o \"severe pain\" patient still drowsy, denies nausea, medicated, see MAR.   1635 Pt ambulated to bathroom, voided. Minimal vaginal bleeding noted.   1643 Patient's mom at bedside.   1700 Patient verbalized readiness to go home. Discharge instructions discussed with patient and mom, copy given. Patient verbalized understanding to instructions. Prescriptions with mom. Belongings with patient.   1726 Discharge criteria met. Pt says pain is tolerable, PIV out, Discharged via wheelchair.      "

## 2019-05-23 NOTE — ANESTHESIA TIME REPORT
Anesthesia Start and Stop Event Times     Date Time Event    5/23/2019 1309 Anesthesia Start     1443 Anesthesia Stop        Responsible Staff  05/23/19    Name Role Begin End    Juan Mohan M.D. Anesth 1309 1443        Preop Diagnosis (Free Text):  Pre-op Diagnosis     ABNORMAL UTERINE BLEEDING, MENOPAUSAL AND PERIMENOPAUSAL DISORDER         Preop Diagnosis (Codes):  Diagnosis Information     Diagnosis Code(s): Abnormal uterine bleeding [N93.9]        Post op Diagnosis  Abnormal uterine bleeding  desires sterilization    Premium Reason  Non-Premium    Comments:

## 2019-05-23 NOTE — ANESTHESIA QCDR
2019 [facility] Clinical Data Registry (for Quality Improvement)     Postoperative nausea/vomiting risk protocol (Adult = 18 yrs and Pediatric 3-17 yrs)- (430 and 463)  General inhalation anesthetic (NOT TIVA) with PONV risk factors: Yes  Provision of anti-emetic therapy with at least 2 different classes of agents: Yes   Patient DID NOT receive anti-emetic therapy and reason is documented in Medical Record:  N/A    Multimodal Pain Management- (AQI59)  Patient undergoing Elective Surgery (i.e. Outpatient, or ASC, or Prescheduled Surgery prior to Hospital Admission): Yes  Use of Multimodal Pain Management, two or more drugs and/or interventions, NOT including systemic opioids: Yes   Exception: Documented allergy to multiple classes of analgesics:  N/A    PACU assessment of acute postoperative pain prior to Anesthesia Care End- Applies to Patients Age = 18- (ABG7)  Initial PACU pain score is which of the following: < 7/10  Patient unable to report pain score: N/A    Post-anesthetic transfer of care checklist/protocol to PACU/ICU- (426 and 427)  Upon conclusion of case, patient transferred to which of the following locations: PACU/Non-ICU  Use of transfer checklist/protocol: Yes  Exclusion: Service Performed in Patient Hospital Room (and thus did not require transfer): N/A    PACU Reintubation- (AQI31)  General anesthesia requiring endotracheal intubation (ETT) along with subsequent extubation in OR or PACU: Yes  Required reintubation in the PACU: No   Extubation was a planned trial documented in the medical record prior to removal of the original airway device:  N/A    Unplanned admission to ICU related to anesthesia service up through end of PACU care- (MD51)  Unplanned admission to ICU (not initially anticipated at anesthesia start time): No

## 2019-05-23 NOTE — ANESTHESIA POSTPROCEDURE EVALUATION
Patient: Gladis Tovar    Procedure Summary     Date:  05/23/19 Room / Location:  [facility] ROOM 23 / SURGERY SAME DAY [facility]    Anesthesia Start:  1309 Anesthesia Stop:  1443    Procedures:       EXAM UNDER ANESTHESIA, PELVIS (Vagina )      PELVISCOPY (Abdomen)      SALPINGECTOMY (Bilateral Abdomen)      HYSTEROSCOPY, DIAGNOSTIC- AND EXCISION OF POLY/FIBROID AND ENDOMETRIAL CURETTAGE (Vagina )      HYSTEROSCOPY, WITH VIDEO IMAGING (Vagina )      HYSTEROSCOPY, WITH TISSUE REMOVAL, USING HYSTEROSCOPIC ROTATING CUTTER BLADE (Vagina ) Diagnosis:       Abnormal uterine bleeding      (ABNORMAL UTERINE BLEEDING)    Surgeon:  Edwina Miranda M.D. Responsible Provider:  Juan Mohan M.D.    Anesthesia Type:  general ASA Status:  2          Final Anesthesia Type: general  Last vitals  BP   Blood Pressure: 128/63, NIBP: 141/49    Temp   36.6 °C (97.9 °F)    Pulse   Pulse: (!) 115, Heart Rate (Monitored): (!) 114   Resp   20    SpO2   91 %      Anesthesia Post Evaluation    Patient location during evaluation: PACU  Patient participation: complete - patient participated  Level of consciousness: awake and alert  Pain score: 0    Airway patency: patent  Anesthetic complications: no  Cardiovascular status: hemodynamically stable  Respiratory status: acceptable  Hydration status: euvolemic    PONV: none           Nurse Pain Score: 0 (NPRS)

## 2019-05-23 NOTE — ANESTHESIA PREPROCEDURE EVALUATION
Relevant Problems   (+) Mild intermittent asthma without complication       Physical Exam    Airway   Mallampati: II  TM distance: >3 FB  Neck ROM: full       Cardiovascular - normal exam  Rhythm: regular  Rate: normal  (-) murmur     Dental - normal exam         Pulmonary - normal exam  Breath sounds clear to auscultation     Abdominal    Neurological - normal exam                 Anesthesia Plan    ASA 2       Plan - general       Airway plan will be ETT        Induction: intravenous    Postoperative Plan: Postoperative administration of opioids is intended.    Pertinent diagnostic labs and testing reviewed    Informed Consent:    Anesthetic plan and risks discussed with patient.    Use of blood products discussed with: patient whom consented to blood products.

## 2019-06-20 DIAGNOSIS — Z01.812 PRE-OPERATIVE LABORATORY EXAMINATION: ICD-10-CM

## 2019-06-20 DIAGNOSIS — Z01.810 PRE-OPERATIVE CARDIOVASCULAR EXAMINATION: ICD-10-CM

## 2019-06-20 LAB
ANION GAP SERPL CALC-SCNC: 11 MMOL/L (ref 0–11.9)
BASOPHILS # BLD AUTO: 0.3 % (ref 0–1.8)
BASOPHILS # BLD: 0.03 K/UL (ref 0–0.12)
BUN SERPL-MCNC: 14 MG/DL (ref 8–22)
CALCIUM SERPL-MCNC: 9.9 MG/DL (ref 8.5–10.5)
CHLORIDE SERPL-SCNC: 107 MMOL/L (ref 96–112)
CO2 SERPL-SCNC: 25 MMOL/L (ref 20–33)
CREAT SERPL-MCNC: 0.82 MG/DL (ref 0.5–1.4)
EOSINOPHIL # BLD AUTO: 0.01 K/UL (ref 0–0.51)
EOSINOPHIL NFR BLD: 0.1 % (ref 0–6.9)
ERYTHROCYTE [DISTWIDTH] IN BLOOD BY AUTOMATED COUNT: 42.5 FL (ref 35.9–50)
GLUCOSE SERPL-MCNC: 75 MG/DL (ref 65–99)
HCG SERPL QL: POSITIVE
HCT VFR BLD AUTO: 43.5 % (ref 37–47)
HGB BLD-MCNC: 13.8 G/DL (ref 12–16)
IMM GRANULOCYTES # BLD AUTO: 0.03 K/UL (ref 0–0.11)
IMM GRANULOCYTES NFR BLD AUTO: 0.3 % (ref 0–0.9)
LYMPHOCYTES # BLD AUTO: 3.35 K/UL (ref 1–4.8)
LYMPHOCYTES NFR BLD: 35.2 % (ref 22–41)
MCH RBC QN AUTO: 27.6 PG (ref 27–33)
MCHC RBC AUTO-ENTMCNC: 31.7 G/DL (ref 33.6–35)
MCV RBC AUTO: 87 FL (ref 81.4–97.8)
MONOCYTES # BLD AUTO: 0.78 K/UL (ref 0–0.85)
MONOCYTES NFR BLD AUTO: 8.2 % (ref 0–13.4)
NEUTROPHILS # BLD AUTO: 5.32 K/UL (ref 2–7.15)
NEUTROPHILS NFR BLD: 55.9 % (ref 44–72)
NRBC # BLD AUTO: 0 K/UL
NRBC BLD-RTO: 0 /100 WBC
PLATELET # BLD AUTO: 201 K/UL (ref 164–446)
PMV BLD AUTO: 10.1 FL (ref 9–12.9)
POTASSIUM SERPL-SCNC: 3.7 MMOL/L (ref 3.6–5.5)
RBC # BLD AUTO: 5 M/UL (ref 4.2–5.4)
SODIUM SERPL-SCNC: 143 MMOL/L (ref 135–145)
WBC # BLD AUTO: 9.5 K/UL (ref 4.8–10.8)

## 2019-06-20 PROCEDURE — 36415 COLL VENOUS BLD VENIPUNCTURE: CPT

## 2019-06-20 PROCEDURE — 85025 COMPLETE CBC W/AUTO DIFF WBC: CPT

## 2019-06-20 PROCEDURE — 80048 BASIC METABOLIC PNL TOTAL CA: CPT

## 2019-06-20 PROCEDURE — 84703 CHORIONIC GONADOTROPIN ASSAY: CPT

## 2019-06-20 PROCEDURE — 93005 ELECTROCARDIOGRAM TRACING: CPT

## 2019-06-21 LAB — EKG IMPRESSION: NORMAL

## 2019-06-21 PROCEDURE — 93010 ELECTROCARDIOGRAM REPORT: CPT | Performed by: INTERNAL MEDICINE

## 2019-06-22 NOTE — H&P
DATE OF PROCEDURE:  2019    PREOPERATIVE DIAGNOSES:  Abnormal uterine bleeding, dysmenorrhea, likely   adenomyosis, history of prior tubal sterilization, desires definitive surgical   management.    PLANNED PROCEDURE:  Examination under anesthesia, pelviscopy,   laparoscopic-assisted vaginal hysterectomy and cystoscopy.    HISTORY OF PRESENT ILLNESS:  The patient is a 48-year-old para 1-3-2-2,   sexually active woman with an unsure LMP who has a history of abnormal   bleeding and pelvic pain who recently underwent an examination under   anesthesia, exploratory laparoscopy, bilateral salpingectomy and diagnostic   and operative hysteroscopy for benign endometrial polyps on 2019 who   continues to have pelvic pain and abnormal bleeding who desires definitive   surgical management.    The patient desires hysterectomy.  I discussed the risks, benefits and   alternatives of an examination under anesthesia, pelviscopy,   laparoscopic-assisted vaginal hysterectomy and cystoscopy with the patient and   she agrees to proceed.    PAST OBSTETRIC HISTORY:  History of  at term.  History of 2 prior NSVDs at   24 weeks, both infants .  History of  at 32 weeks and history of SABs   x2.    PAST SURGICAL HISTORY:  Breast surgery, tonsillectomy, appendectomy, D and C,   and then the exploratory laparoscopy, bilateral salpingectomy, diagnostic and   operative hysteroscopy with excision of endometrial polyps on 2019.    ALLERGIES:  TO CODEINE.    PAST MEDICAL HISTORY:  Depression, obesity.    SOCIAL HISTORY:  She has a partner.  She denies alcohol, tobacco, or drugs of   abuse.    REVIEW OF SYSTEMS:  Negative.    FAMILY HISTORY:  Noncontributory.    PHYSICAL EXAMINATION:  VITAL SIGNS:  Blood pressure 110/70, pulse 81, temperature 98.2, BMI 41.19.  GENERAL:  Alert, awake and oriented x3, in no acute distress.  LUNGS:  Clear to auscultation bilaterally.  HEART:  Regular rate and rhythm.  No murmurs, rubs or  gallops.  S1, S2 intact.  EXTREMITIES:  No clubbing, cyanosis or edema.  SKIN:  Well-healed previous laparoscopic incisions.  GENITOURINARY:  Deferred.    PREOPERATIVE LABORATORY STUDIES:  White count 9.5, hemoglobin 13.8, hematocrit   43.5 and platelets 201.  Sodium 143, potassium 3.7, chloride 107, CO2 is 25,   glucose 75, BUN 14, creatinine 0.62 and calcium 9.9.  Her qualitative beta hCG   is actually positive, so we will run a stat quantitative beta hCG, it is   possible that there is a heterophilic antibody leading to a false positive   qualitative beta hCG.  If her quantitative Beta hCG is positive, we will have   to cancel the surgery.    EKG:  Sinus rhythm.    ASSESSMENT AND PLAN:  A 48-year-old para 1-3-2-2, sexually active woman with   an unsure LMP who has abnormal bleeding who recently underwent an examination   under anesthesia, exploratory laparoscopy, bilateral salpingectomy, diagnostic   and operative hysteroscopy for benign endometrial polyps who continues to   have pelvic pain and abnormal bleeding who desires definitive surgical   management.  The risks, benefits and alternatives of an examination under   anesthesia, pelviscopy, laparoscopic-assisted vaginal hysterectomy and   cystoscopy were discussed with the patient and she agrees to proceed.  We will   obtain a quantitative beta hCG prior to the surgery and if negative, we will   proceed as scheduled.  If positive, we will have to delay the surgery.    Risks, benefits and alternatives of the procedure discussed with the patient   and she agrees to proceed.       ____________________________________     WENDY BIRMINGHAM MD    HTA / NTS    DD:  06/21/2019 11:39:50  DT:  06/21/2019 14:12:20    D#:  [id number]  Job#:  [id number]

## 2019-06-24 ENCOUNTER — ANESTHESIA (OUTPATIENT)
Dept: SURGERY | Facility: MEDICAL CENTER | Age: 49
End: 2019-06-24
Payer: COMMERCIAL

## 2019-06-24 ENCOUNTER — ANESTHESIA EVENT (OUTPATIENT)
Dept: SURGERY | Facility: MEDICAL CENTER | Age: 49
End: 2019-06-24
Payer: COMMERCIAL

## 2019-06-24 ENCOUNTER — HOSPITAL ENCOUNTER (OUTPATIENT)
Facility: MEDICAL CENTER | Age: 49
End: 2019-06-24
Attending: OBSTETRICS & GYNECOLOGY | Admitting: OBSTETRICS & GYNECOLOGY
Payer: COMMERCIAL

## 2019-06-24 VITALS
OXYGEN SATURATION: 95 % | DIASTOLIC BLOOD PRESSURE: 65 MMHG | BODY MASS INDEX: 39.67 KG/M2 | TEMPERATURE: 97.7 F | HEIGHT: 65 IN | WEIGHT: 238.1 LBS | SYSTOLIC BLOOD PRESSURE: 118 MMHG | HEART RATE: 99 BPM | RESPIRATION RATE: 15 BRPM

## 2019-06-24 DIAGNOSIS — G89.18 PAIN FOLLOWING SURGERY OR PROCEDURE: ICD-10-CM

## 2019-06-24 LAB — PATHOLOGY CONSULT NOTE: NORMAL

## 2019-06-24 PROCEDURE — 500512 HCHG ENDO PEANUT: Performed by: OBSTETRICS & GYNECOLOGY

## 2019-06-24 PROCEDURE — 160048 HCHG OR STATISTICAL LEVEL 1-5: Performed by: OBSTETRICS & GYNECOLOGY

## 2019-06-24 PROCEDURE — 700102 HCHG RX REV CODE 250 W/ 637 OVERRIDE(OP): Performed by: ANESTHESIOLOGY

## 2019-06-24 PROCEDURE — 160009 HCHG ANES TIME/MIN: Performed by: OBSTETRICS & GYNECOLOGY

## 2019-06-24 PROCEDURE — 160036 HCHG PACU - EA ADDL 30 MINS PHASE I: Performed by: OBSTETRICS & GYNECOLOGY

## 2019-06-24 PROCEDURE — 501330 HCHG SET, CYSTO IRRIG TUBING: Performed by: OBSTETRICS & GYNECOLOGY

## 2019-06-24 PROCEDURE — A4338 INDWELLING CATHETER LATEX: HCPCS | Performed by: OBSTETRICS & GYNECOLOGY

## 2019-06-24 PROCEDURE — 502703 HCHG DEVICE, LIGASURE V SEALER: Performed by: OBSTETRICS & GYNECOLOGY

## 2019-06-24 PROCEDURE — A9270 NON-COVERED ITEM OR SERVICE: HCPCS | Performed by: ANESTHESIOLOGY

## 2019-06-24 PROCEDURE — 700111 HCHG RX REV CODE 636 W/ 250 OVERRIDE (IP): Performed by: ANESTHESIOLOGY

## 2019-06-24 PROCEDURE — 501411 HCHG SPONGE, BABY LAP W/O RINGS: Performed by: OBSTETRICS & GYNECOLOGY

## 2019-06-24 PROCEDURE — 88307 TISSUE EXAM BY PATHOLOGIST: CPT

## 2019-06-24 PROCEDURE — 700105 HCHG RX REV CODE 258: Performed by: OBSTETRICS & GYNECOLOGY

## 2019-06-24 PROCEDURE — 500002 HCHG ADHESIVE, DERMABOND: Performed by: OBSTETRICS & GYNECOLOGY

## 2019-06-24 PROCEDURE — 160025 RECOVERY II MINUTES (STATS): Performed by: OBSTETRICS & GYNECOLOGY

## 2019-06-24 PROCEDURE — 700101 HCHG RX REV CODE 250: Performed by: ANESTHESIOLOGY

## 2019-06-24 PROCEDURE — 160046 HCHG PACU - 1ST 60 MINS PHASE II: Performed by: OBSTETRICS & GYNECOLOGY

## 2019-06-24 PROCEDURE — 500886 HCHG PACK, LAPAROSCOPY: Performed by: OBSTETRICS & GYNECOLOGY

## 2019-06-24 PROCEDURE — 160029 HCHG SURGERY MINUTES - 1ST 30 MINS LEVEL 4: Performed by: OBSTETRICS & GYNECOLOGY

## 2019-06-24 PROCEDURE — 160041 HCHG SURGERY MINUTES - EA ADDL 1 MIN LEVEL 4: Performed by: OBSTETRICS & GYNECOLOGY

## 2019-06-24 PROCEDURE — 160047 HCHG PACU  - EA ADDL 30 MINS PHASE II: Performed by: OBSTETRICS & GYNECOLOGY

## 2019-06-24 PROCEDURE — 160035 HCHG PACU - 1ST 60 MINS PHASE I: Performed by: OBSTETRICS & GYNECOLOGY

## 2019-06-24 PROCEDURE — A6402 STERILE GAUZE <= 16 SQ IN: HCPCS | Performed by: OBSTETRICS & GYNECOLOGY

## 2019-06-24 PROCEDURE — 501582 HCHG TROCAR, THRD BLADED: Performed by: OBSTETRICS & GYNECOLOGY

## 2019-06-24 PROCEDURE — 501838 HCHG SUTURE GENERAL: Performed by: OBSTETRICS & GYNECOLOGY

## 2019-06-24 PROCEDURE — 160002 HCHG RECOVERY MINUTES (STAT): Performed by: OBSTETRICS & GYNECOLOGY

## 2019-06-24 PROCEDURE — 700101 HCHG RX REV CODE 250: Performed by: OBSTETRICS & GYNECOLOGY

## 2019-06-24 RX ORDER — HYDROMORPHONE HYDROCHLORIDE 1 MG/ML
0.4 INJECTION, SOLUTION INTRAMUSCULAR; INTRAVENOUS; SUBCUTANEOUS
Status: DISCONTINUED | OUTPATIENT
Start: 2019-06-24 | End: 2019-06-24 | Stop reason: HOSPADM

## 2019-06-24 RX ORDER — OXYCODONE HCL 5 MG/5 ML
10 SOLUTION, ORAL ORAL
Status: COMPLETED | OUTPATIENT
Start: 2019-06-24 | End: 2019-06-24

## 2019-06-24 RX ORDER — CEFOTETAN DISODIUM 2 G/20ML
INJECTION, POWDER, FOR SOLUTION INTRAMUSCULAR; INTRAVENOUS PRN
Status: DISCONTINUED | OUTPATIENT
Start: 2019-06-24 | End: 2019-06-24 | Stop reason: SURG

## 2019-06-24 RX ORDER — GABAPENTIN 300 MG/1
300 CAPSULE ORAL ONCE
Status: COMPLETED | OUTPATIENT
Start: 2019-06-24 | End: 2019-06-24

## 2019-06-24 RX ORDER — HYDROMORPHONE HYDROCHLORIDE 1 MG/ML
0.2 INJECTION, SOLUTION INTRAMUSCULAR; INTRAVENOUS; SUBCUTANEOUS
Status: DISCONTINUED | OUTPATIENT
Start: 2019-06-24 | End: 2019-06-24 | Stop reason: HOSPADM

## 2019-06-24 RX ORDER — HALOPERIDOL 5 MG/ML
1 INJECTION INTRAMUSCULAR
Status: DISCONTINUED | OUTPATIENT
Start: 2019-06-24 | End: 2019-06-24 | Stop reason: HOSPADM

## 2019-06-24 RX ORDER — DEXAMETHASONE SODIUM PHOSPHATE 4 MG/ML
INJECTION, SOLUTION INTRA-ARTICULAR; INTRALESIONAL; INTRAMUSCULAR; INTRAVENOUS; SOFT TISSUE PRN
Status: DISCONTINUED | OUTPATIENT
Start: 2019-06-24 | End: 2019-06-24 | Stop reason: SURG

## 2019-06-24 RX ORDER — ONDANSETRON 2 MG/ML
4 INJECTION INTRAMUSCULAR; INTRAVENOUS
Status: COMPLETED | OUTPATIENT
Start: 2019-06-24 | End: 2019-06-24

## 2019-06-24 RX ORDER — SODIUM CHLORIDE, SODIUM LACTATE, POTASSIUM CHLORIDE, CALCIUM CHLORIDE 600; 310; 30; 20 MG/100ML; MG/100ML; MG/100ML; MG/100ML
INJECTION, SOLUTION INTRAVENOUS CONTINUOUS
Status: DISCONTINUED | OUTPATIENT
Start: 2019-06-24 | End: 2019-06-24 | Stop reason: HOSPADM

## 2019-06-24 RX ORDER — ACETAMINOPHEN 500 MG
1000 TABLET ORAL ONCE
Status: COMPLETED | OUTPATIENT
Start: 2019-06-24 | End: 2019-06-24

## 2019-06-24 RX ORDER — PROMETHAZINE HYDROCHLORIDE 25 MG/1
12.5 SUPPOSITORY RECTAL EVERY 4 HOURS PRN
Status: DISCONTINUED | OUTPATIENT
Start: 2019-06-24 | End: 2019-06-24 | Stop reason: HOSPADM

## 2019-06-24 RX ORDER — HYDROMORPHONE HYDROCHLORIDE 1 MG/ML
0.1 INJECTION, SOLUTION INTRAMUSCULAR; INTRAVENOUS; SUBCUTANEOUS
Status: DISCONTINUED | OUTPATIENT
Start: 2019-06-24 | End: 2019-06-24 | Stop reason: HOSPADM

## 2019-06-24 RX ORDER — KETOROLAC TROMETHAMINE 30 MG/ML
INJECTION, SOLUTION INTRAMUSCULAR; INTRAVENOUS PRN
Status: DISCONTINUED | OUTPATIENT
Start: 2019-06-24 | End: 2019-06-24 | Stop reason: SURG

## 2019-06-24 RX ORDER — SIMETHICONE 80 MG
80 TABLET,CHEWABLE ORAL EVERY 8 HOURS PRN
Status: DISCONTINUED | OUTPATIENT
Start: 2019-06-24 | End: 2019-06-24 | Stop reason: HOSPADM

## 2019-06-24 RX ORDER — ONDANSETRON 2 MG/ML
INJECTION INTRAMUSCULAR; INTRAVENOUS PRN
Status: DISCONTINUED | OUTPATIENT
Start: 2019-06-24 | End: 2019-06-24 | Stop reason: SURG

## 2019-06-24 RX ORDER — BUPIVACAINE HYDROCHLORIDE AND EPINEPHRINE 2.5; 5 MG/ML; UG/ML
INJECTION, SOLUTION EPIDURAL; INFILTRATION; INTRACAUDAL; PERINEURAL
Status: DISCONTINUED
Start: 2019-06-24 | End: 2019-06-24 | Stop reason: HOSPADM

## 2019-06-24 RX ORDER — OXYCODONE HCL 5 MG/5 ML
5 SOLUTION, ORAL ORAL
Status: COMPLETED | OUTPATIENT
Start: 2019-06-24 | End: 2019-06-24

## 2019-06-24 RX ORDER — MEPERIDINE HYDROCHLORIDE 25 MG/ML
6.25 INJECTION INTRAMUSCULAR; INTRAVENOUS; SUBCUTANEOUS
Status: DISCONTINUED | OUTPATIENT
Start: 2019-06-24 | End: 2019-06-24 | Stop reason: HOSPADM

## 2019-06-24 RX ORDER — BUPIVACAINE HYDROCHLORIDE AND EPINEPHRINE 2.5; 5 MG/ML; UG/ML
INJECTION, SOLUTION EPIDURAL; INFILTRATION; INTRACAUDAL; PERINEURAL
Status: DISCONTINUED | OUTPATIENT
Start: 2019-06-24 | End: 2019-06-24 | Stop reason: HOSPADM

## 2019-06-24 RX ORDER — OXYCODONE HYDROCHLORIDE AND ACETAMINOPHEN 5; 325 MG/1; MG/1
1-2 TABLET ORAL EVERY 4 HOURS PRN
Qty: 30 TAB | Refills: 0 | Status: SHIPPED | OUTPATIENT
Start: 2019-06-24 | End: 2019-07-01

## 2019-06-24 RX ADMIN — ONDANSETRON 4 MG: 2 INJECTION INTRAMUSCULAR; INTRAVENOUS at 15:16

## 2019-06-24 RX ADMIN — FENTANYL CITRATE 50 MCG: 50 INJECTION, SOLUTION INTRAMUSCULAR; INTRAVENOUS at 14:24

## 2019-06-24 RX ADMIN — CEFOTETAN DISODIUM 2 G: 2 INJECTION, POWDER, FOR SOLUTION INTRAMUSCULAR; INTRAVENOUS at 12:38

## 2019-06-24 RX ADMIN — ACETAMINOPHEN 1000 MG: 500 TABLET ORAL at 11:19

## 2019-06-24 RX ADMIN — DEXAMETHASONE SODIUM PHOSPHATE 8 MG: 4 INJECTION, SOLUTION INTRA-ARTICULAR; INTRALESIONAL; INTRAMUSCULAR; INTRAVENOUS; SOFT TISSUE at 12:46

## 2019-06-24 RX ADMIN — FENTANYL CITRATE 50 MCG: 50 INJECTION, SOLUTION INTRAMUSCULAR; INTRAVENOUS at 13:46

## 2019-06-24 RX ADMIN — ONDANSETRON 4 MG: 2 INJECTION INTRAMUSCULAR; INTRAVENOUS at 14:15

## 2019-06-24 RX ADMIN — FLUORESCEIN SODIUM 0.5 ML: 100 INJECTION INTRAVENOUS at 14:12

## 2019-06-24 RX ADMIN — GABAPENTIN 300 MG: 300 CAPSULE ORAL at 11:18

## 2019-06-24 RX ADMIN — SODIUM CHLORIDE, POTASSIUM CHLORIDE, SODIUM LACTATE AND CALCIUM CHLORIDE 1000 ML: 600; 310; 30; 20 INJECTION, SOLUTION INTRAVENOUS at 11:18

## 2019-06-24 RX ADMIN — FENTANYL CITRATE 200 MCG: 50 INJECTION, SOLUTION INTRAMUSCULAR; INTRAVENOUS at 12:38

## 2019-06-24 RX ADMIN — HYDROMORPHONE HYDROCHLORIDE 0.4 MG: 1 INJECTION, SOLUTION INTRAMUSCULAR; INTRAVENOUS; SUBCUTANEOUS at 16:10

## 2019-06-24 RX ADMIN — SODIUM CHLORIDE, POTASSIUM CHLORIDE, SODIUM LACTATE AND CALCIUM CHLORIDE: 600; 310; 30; 20 INJECTION, SOLUTION INTRAVENOUS at 13:25

## 2019-06-24 RX ADMIN — PROPOFOL 200 MG: 10 INJECTION, EMULSION INTRAVENOUS at 12:38

## 2019-06-24 RX ADMIN — ROCURONIUM BROMIDE 15 MG: 10 INJECTION, SOLUTION INTRAVENOUS at 14:10

## 2019-06-24 RX ADMIN — FENTANYL CITRATE 50 MCG: 50 INJECTION, SOLUTION INTRAMUSCULAR; INTRAVENOUS at 15:11

## 2019-06-24 RX ADMIN — OXYCODONE HYDROCHLORIDE 10 MG: 5 SOLUTION ORAL at 15:23

## 2019-06-24 RX ADMIN — FENTANYL CITRATE 50 MCG: 50 INJECTION, SOLUTION INTRAMUSCULAR; INTRAVENOUS at 15:23

## 2019-06-24 RX ADMIN — HYDROMORPHONE HYDROCHLORIDE 0.4 MG: 1 INJECTION, SOLUTION INTRAMUSCULAR; INTRAVENOUS; SUBCUTANEOUS at 15:43

## 2019-06-24 RX ADMIN — KETOROLAC TROMETHAMINE 30 MG: 30 INJECTION, SOLUTION INTRAMUSCULAR at 14:19

## 2019-06-24 RX ADMIN — ROCURONIUM BROMIDE 50 MG: 10 INJECTION, SOLUTION INTRAVENOUS at 12:38

## 2019-06-24 RX ADMIN — SUGAMMADEX 200 MG: 100 INJECTION, SOLUTION INTRAVENOUS at 14:20

## 2019-06-24 RX ADMIN — SODIUM CHLORIDE, POTASSIUM CHLORIDE, SODIUM LACTATE AND CALCIUM CHLORIDE: 600; 310; 30; 20 INJECTION, SOLUTION INTRAVENOUS at 14:30

## 2019-06-24 RX ADMIN — MIDAZOLAM HYDROCHLORIDE 2 MG: 1 INJECTION, SOLUTION INTRAMUSCULAR; INTRAVENOUS at 12:30

## 2019-06-24 RX ADMIN — LIDOCAINE HYDROCHLORIDE 80 MG: 20 INJECTION, SOLUTION INFILTRATION; PERINEURAL at 12:38

## 2019-06-24 ASSESSMENT — PAIN SCALES - GENERAL: PAIN_LEVEL: 4

## 2019-06-24 NOTE — ANESTHESIA POSTPROCEDURE EVALUATION
Patient: Gladis Tovar    Procedure Summary     Date:  06/24/19 Room / Location:  [facility] ROOM 26 / SURGERY SAME DAY Health system    Anesthesia Start:  1229 Anesthesia Stop:  1454    Procedures:       EXAM UNDER ANESTHESIA, PELVIS (N/A Vagina )      PELVISCOPY (Vagina )      HYSTERECTOMY, TOTAL, VAGINAL, LAPAROSCOPY-ASSISTED (Vagina )      CYSTOSCOPY (Vagina ) Diagnosis:       Abnormal uterine bleeding      Endometriosis      (ABNORMAL UTERINE BLEEDING, ADENOMYOSIS, ENDOMETRIOSIS)    Surgeon:  Edwina Miranda M.D. Responsible Provider:  Juan Mohan M.D.    Anesthesia Type:  general ASA Status:  3          Final Anesthesia Type: general  Last vitals  BP   Blood Pressure: 118/65, NIBP: 150/60    Temp   36.3 °C (97.3 °F)    Pulse   Pulse: 93   Resp   20    SpO2   100 %      Anesthesia Post Evaluation    Patient location during evaluation: PACU  Patient participation: complete - patient participated  Level of consciousness: awake and alert  Pain score: 4    Airway patency: patent  Anesthetic complications: no  Cardiovascular status: hemodynamically stable  Respiratory status: acceptable  Hydration status: euvolemic    PONV: none           Nurse Pain Score: 0 (NPRS)

## 2019-06-24 NOTE — ANESTHESIA TIME REPORT
Anesthesia Start and Stop Event Times     Date Time Event    6/24/2019 1229 Anesthesia Start     1454 Anesthesia Stop        Responsible Staff  06/24/19    Name Role Begin End    Juan Mohan M.D. Anesth 1229 1454        Preop Diagnosis (Free Text):  Pre-op Diagnosis     ABNORMAL UTERINE BLEEDING, ADENOMYOSIS, ENDOMETRIOSIS        Preop Diagnosis (Codes):  Diagnosis Information     Diagnosis Code(s): Abnormal uterine bleeding [N93.9]     Endometriosis [617]        Post op Diagnosis  Abnormal uterine bleeding      Premium Reason  Non-Premium    Comments:

## 2019-06-24 NOTE — OR SURGEON
Immediate Post OP Note    PreOp Diagnosis:     1.  Abnormal uterine bleeding.  2.  Dysmenorrhea.  3.  Likely adenomyosis.  4.  Pelvic pain.  5.  Previous bilateral salpingectomy for permanent sterilization.  6.  Desires definitive surgical management.    PostOp Diagnosis: As above - pathology pending.    Procedure(s):  EXAM UNDER ANESTHESIA, PELVIS - Wound Class: Clean Contaminated  PELVISCOPY - Wound Class: Clean Contaminated  HYSTERECTOMY, TOTAL, VAGINAL, LAPAROSCOPY-ASSISTED - Wound Class: Clean Contaminated  CYSTOSCOPY WITH BLADDER DISTENSION- Wound Class: Clean Contaminated    Surgeon(s):  KONSTANTIN Morales M.D.    Anesthesiologist/Type of Anesthesia:  Anesthesiologist: Juan Mohan M.D./General and local anesthetic    Surgical Staff:  Circulator: Mary Sorto R.N.  Relief Circulator: Jenny Drake R.N.  Scrub Person: Sachi Wiseman; Key Rhodes    Specimens removed if any:  ID Type Source Tests Collected by Time Destination   A : uterus, cervix Tissue Uterus PATHOLOGY SPECIMEN Edwina Miranda M.D. 6/24/2019 1329        Estimated Blood Loss: 50 cc    Findings: EUA: uterus is AV mobile and 7 weeks size.  No adnexal masses.  Cervix is shortened and scarred from previous history of cerclage placement during previous pregnancies.      Laparoscopic findings: normal bilateral ovaries.  Normal uterus.  Bilateral ureters visualized.  Previous bilateral salpingectomy noted.    Cystoscopic findings: bladder is erythematous with findings consistent with IC.  Bilateral efflux of yellow urine from both ureteral orifices.  No evidence of bladder injury or suture material in bladder.  No masses or lesions noted.    Complications: none apparent        6/24/2019 2:41 PM Edwina Miranda M.D.

## 2019-06-24 NOTE — ANESTHESIA PREPROCEDURE EVALUATION
Relevant Problems   (+) Hypercholesterolemia   (+) Mild intermittent asthma without complication   (+) Moderate episode of recurrent major depressive disorder (HCC)   (+) Obesity (BMI 35.0-39.9 without comorbidity)       Physical Exam    Airway   Mallampati: II  TM distance: >3 FB  Neck ROM: full       Cardiovascular - normal exam  Rhythm: regular  Rate: normal  (-) murmur     Dental - normal exam         Pulmonary - normal exam  Breath sounds clear to auscultation     Abdominal    Neurological - normal exam                 Anesthesia Plan    ASA 3   ASA physical status 3 criteria: morbid obesity - BMI greater than or equal to 40    Plan - general       Airway plan will be ETT        Induction: intravenous    Postoperative Plan: Postoperative administration of opioids is intended.    Pertinent diagnostic labs and testing reviewed    Informed Consent:    Anesthetic plan and risks discussed with patient.    Use of blood products discussed with: patient whom consented to blood products.

## 2019-06-24 NOTE — ANESTHESIA PROCEDURE NOTES
Airway  Date/Time: 6/24/2019 12:40 PM  Performed by: MAURICIO CRISOSTOMO  Authorized by: MAURICIO CRISOSTOMO     Location:  OR  Urgency:  Elective  Indications for Airway Management:  Anesthesia  Spontaneous Ventilation: absent    Sedation Level:  Deep  Preoxygenated: Yes    Patient Position:  Sniffing  Mask Difficulty Assessment:  1 - vent by mask  Final Airway Type:  Endotracheal airway  Final Endotracheal Airway:  ETT  Cuffed: Yes    Technique Used for Successful ETT Placement:  Direct laryngoscopy  Devices/Methods Used in Placement:  Intubating stylet  Insertion Site:  Oral  Blade Type:  Randle  Laryngoscope Blade/Videolaryngoscope Blade Size:  2  ETT Size (mm):  6.5  Measured from:  Teeth  ETT to Teeth (cm):  22  Placement Verified by: auscultation and capnometry    Cormack-Lehane Classification:  Grade IIb - view of arytenoids or posterior of glottis only  Number of Attempts at Approach:  1

## 2019-06-24 NOTE — OR NURSING
1453 Patient arrived from OR on [facility]. Report received from RN and Anesthesia. Patient's VS WNL, patient arousable, stable, breathing even/non labored.   1511 pt c/o increased pain, 6-7/10, medicated with fentanyl.  1523 Pt tolerating PO water, denying nausea, medicated with PO oxycodone.  1530 Martines catheter removed as per order.   1640 Pt's family member at bedside.   1730 Pt ambulated to bathroom, voided, small amount of vaginal bleeding noted on pad.   1750 Patient verbalized readiness to go home. Discharge instructions discussed with patient and family, copy given. Patient verbalized understanding to instructions.Belongings with patient.   1808 Discharge criteria met. PIV out, Discharged via wheelchair.

## 2019-06-25 NOTE — OP REPORT
DATE OF SERVICE:  06/24/2019    PREOPERATIVE DIAGNOSES:  1.  Abnormal uterine bleeding.  2.  Dysmenorrhea.  3.  Pelvic pain.  4.  Likely adenomyosis.  5.  Previous bilateral salpingectomy for permanent sterilization.  6.  Desires definitive surgical management.    POSTOPERATIVE DIAGNOSES:  1.  Abnormal uterine bleeding.  2.  Dysmenorrhea.  3.  Pelvic pain.  4.  Likely adenomyosis.  5.  Previous bilateral salpingectomy for permanent sterilization.  6.  Desires definitive surgical management.    PATHOLOGY:  Pending, unlikely interstitial cystitis.    PROCEDURES:  Examination under anesthesia, pelviscopy, total laparoscopic   hysterectomy, and cystoscopy with bladder distention.    SURGEON:  Edwina Miranda MD    ASSISTANT:  Grant Maya MD    ANESTHESIOLOGIST:  Juan Mohan MD    ANESTHESIA:  General endotracheal tube anesthesia and local anesthetic.    SPECIMENS:  Uterus and cervix.    INDICATIONS FOR THE PROCEDURE:  The patient is a 48-year-old para 1-3-2-2   sexually active woman with an unsure last menstrual period, who has a history   of abnormal uterine bleeding and pelvic pain, who likely has adenomyosis and   who underwent a bilateral salpingectomy and hysteroscopy on 05/23/2019, who   continues to have pelvic pain and abnormal bleeding, desires definitive   surgical management.    FINDINGS:  On examination under anesthesia, her uterus is anteverted, mobile,   and 7 weeks size.  There are no adnexal masses.  Her cervix is shortened and   scarred from previous history of cerclage placement during previous   pregnancies.    LAPAROSCOPIC FINDINGS:  Normal bilateral ovaries, normal uterus, bilateral   ureters visualized.  Previous bilateral salpingectomy noted.  No   abdominopelvic adhesive disease.    CYSTOSCOPIC FINDINGS:  Her bladder is erythematous with findings consistent   with IC.  Bilateral efflux of yellow urine noted from both ureteral orifices.    No evidence of bladder injury or  suture material in the bladder.  No masses   or lesions noted.    COMPLICATIONS:  None apparent.    DETAILS OF THE PROCEDURE:  The patient was taken to the operating room where   she underwent general endotracheal tube anesthesia.  She was then placed in   the dorsal lithotomy position in the [facility].  Examination   under anesthesia was performed and the findings are noted as above.  The   patient was then prepped and draped in the dorsal lithotomy position.  A Martines   catheter was placed in her urinary bladder.  An exam under anesthesia was   performed and the findings are noted as above.  A medium Graves speculum was   inserted into the vagina.  The cervix was visualized and grasped with an Allis   clamp.  The endocervix was dilated to allow the VCare manipulator to be   placed within the uterus.  The VCare was placed to 7 cm.  The balloon was   inflated.  The tenaculum and speculum were removed.  The cervical cup was   placed around the cervix in the usual fashion and then the vaginal cup was   pushed against the cervical cup and secured in place.    The attention was turned to the laparoscopic portion of the procedure.  A   10-mm infraumbilical skin incision was made with the scalpel after the   instillation of 0.25% Marcaine with epinephrine.  The incision was carried   down to the level of the fascia.  The fascia was grasped with Kocher clamps,   elevated, and the fascial incision was extended laterally with Kapadia scissors.    Either side of the fascial incision was sutured with 0 Vicryl stay sutures.    The S retractors were placed within this incision and the peritoneum was   entered bluntly.  The patient was placed in Trendelenburg and the trocar was   inserted under direct visualization.  The CO2 gas was connected and the   opening pressure was 8 mmHg.    The patient's pelvis and abdomen were explored and the findings are noted as   above.  The uterus was elevated and the bilateral ovaries  were within normal   limits.  The patient had had a previous bilateral salpingectomy.  The   bilateral ureters were visualized coursing through the medial leaves of the   broad ligament.    Two additional lateral trocar sites were identified after transilluminating   the anterior abdominal wall.  First, the left lateral trocar site was made.  A   5-mm skin incision was made with the scalpel after the instillation of 0.25%   Marcaine with epinephrine.  The trocar was inserted under direct   visualization.    The right trocar site was identified after transilluminating the anterior   abdominal wall, 5 mm skin incision was made with a scalpel after the   instillation of 0.25% Marcaine with epinephrine.    The uterus was elevated and the left ureter was again identified.  The round   ligament was grasped with the Prestige clamp.  Using the LigaSure, the round   ligament was cauterized in 3 proximal locations and then incised.  The   uteroovarian ligament was cauterized in 3 proximal locations and then incised.    The anterior leaf of the broad ligament was elevated with the LigaSure,   cauterized and then incised.  Following along the vesicouterine peritoneum,   the bladder flap was created by elevating the vesicouterine peritoneum and   cauterizing it.  The left uterine artery was identified and cauterized in 3   proximal locations.  The left uterosacral ligament was cauterized and then   incised.    Attention was turned to the right side of the pelvis where the right ureter   was identified coursing through the medial leaf of the broad ligament.  The   right uteroovarian ligament was cauterized in 3 proximal locations and then   incised.  The right round ligament was cauterized in 3 proximal locations and   then incised.  The anterior leaf of the broad ligament was elevated with a   Prestige clamp, undermined with the one blade of the LigaSure, cauterized and   then incised.  Following along the vesicouterine  peritoneum, the remainder of   the bladder flap was created.  The bladder was pushed off the lower uterine   segment with the Endo Peanut.  The right uterine artery was identified and   cauterized in 3 proximal locations with the LigaSure and then incised.  The   uterosacral ligament was identified, cauterized with the LigaSure, and then   incised.  The bladder was further pushed off the lower uterine segment.  The   cervical cup was palpated using the Prestige clamp and the colpotomy was   performed using the J hook cautery.  The colpotomy was performed without   difficulty.  The specimen was delivered through the vagina.  The pelvis was   irrigated with sterile water and the pedicles were found to be hemostatic.    The laparoscope and instruments were removed from the patient's abdomen.  The   CO2 gas was disconnected and the light was placed on standby.    Attention was turned to the closure of the vaginal cuff, which was performed   vaginally.  The patient was placed in high lithotomy position.  The   medium-weighted speculum was placed in the vagina.  The vaginal cuff angles   were grasped with Allis clamps and the bilateral angles were sutured with 0   Vicryl incorporating the uterosacral ligaments.  The remainder of the vaginal   cuff was approximated with 0 Vicryl in a running locked fashion.  The vaginal   cuff was irrigated and there was a small area of bleeding along the vaginal   cuff at the right side of the cuff.  This was made hemostatic with a series of   figure-of-X of 0 Vicryl suture.    Additional irrigation was performed and the cuff was noted to be hemostatic.    Attention was again turned to the laparoscopic portion of the procedure, where   the CO2 gas was reconnected.  The laparoscope was inserted and the vaginal   cuff was thoroughly examined and found to be hemostatic.  The pelvis was   irrigated and again, all pedicles and vaginal cuff were examined and found to   be hemostatic.  Adiel  was placed along the vaginal cuff and all pedicles.    The bilateral ovaries appeared to be within normal limits.    The trocars were removed under direct visualization.  The CO2 gas was released   from the patient's abdomen.  The fascia was reapproximated using the stay   sutures and additional 0 Vicryl suture.  The subcutaneous tissues were   reapproximated using 2-0 Vicryl and the skin was closed with 4-0 Monocryl.    The bilateral lateral trocars skin was reapproximated using 4-0 Monocryl.  The   wounds were dressed with benzoin, Steri-Strips, 2x2, and Tegaderm.    The Martines catheter was removed from the patient's urinary bladder.  The   30-degree cystoscope was inserted.  The patient had received fluorescein.    Bilateral efflux of yellow urine was noted to be effluxing from the bilateral   ureteral orifices.  There was no evidence of mass or lesion in the bladder.    There was no evidence of suture material or injury to the bladder.  However,   the bladder was noted to be erythematous, which was likely secondary to her   having a new diagnosis of interstitial cystitis.  The background mucosa had   dilated and injected blood vessels and was diffusely erythematous consistent   with interstitial cystitis.  The cystoscope was removed and the Martines catheter   was returned to the patient's urinary bladder.    The patient was taken out of dorsal lithotomy position.  She was then   extubated and taken to the PACU in stable condition.  Sponge, lap, and needle   counts were correct x2.       ____________________________________     MD JULIUS HINES / NTS    DD:  06/24/2019 17:27:30  DT:  06/24/2019 18:07:00    D#:  [id number]  Job#:  [id number]

## 2019-06-25 NOTE — DISCHARGE INSTRUCTIONS
ACTIVITY: Rest and take it easy for the first 24 hours.  A responsible adult is recommended to remain with you during that time.  It is normal to feel sleepy.  We encourage you to not do anything that requires balance, judgment or coordination.    MILD FLU-LIKE SYMPTOMS ARE NORMAL. YOU MAY EXPERIENCE GENERALIZED MUSCLE ACHES, THROAT IRRITATION, HEADACHE AND/OR SOME NAUSEA.    FOR 24 HOURS DO NOT:  Drive, operate machinery or run household appliances.  Drink beer or alcoholic beverages.   Make important decisions or sign legal documents.    SPECIAL INSTRUCTIONS: Call for temp >100.4 degrees Farenheit.  Call for heavy vaginal bleeding (soaking a peripad in one hour for 2 hours or passing golf ball sized clots).  No driving for 2 weeks or while on narcotics, no lifting >20 pounds for 4 weeks, and pelvic rest (no tampons/douche/sexual intercourse) for 6 weeks.  Call for sign/symptoms of Deep Vein thrombosis/Pulmonary embolus or if incision oozes blood/pus/fluid.    DIET: To avoid nausea, slowly advance diet as tolerated, avoiding spicy or greasy foods for the first day.  Add more substantial food to your diet according to your physician's instructions.  Babies can be fed formula or breast milk as soon as they are hungry.  INCREASE FLUIDS AND FIBER TO AVOID CONSTIPATION.    SURGICAL DRESSING/BATHING: may shower tomorrow, no tub baths/hot tubs/swimming until cleared by doctor.     FOLLOW-UP APPOINTMENT:  A follow-up appointment should be arranged with your doctor call to schedule.    You should CALL YOUR PHYSICIAN if you develop:  Fever greater than 101 degrees F.  Pain not relieved by medication, or persistent nausea or vomiting.  Excessive bleeding (blood soaking through dressing) or unexpected drainage from the wound.  Extreme redness or swelling around the incision site, drainage of pus or foul smelling drainage.  Inability to urinate or empty your bladder within 8 hours.  Problems with breathing or chest  pain.    You should call 911 if you develop problems with breathing or chest pain.  If you are unable to contact your doctor or surgical center, you should go to the nearest emergency room or urgent care center.  Physician's telephone #: [phone]    If any questions arise, call your doctor.  If your doctor is not available, please feel free to call the Surgical Center at [phone].  The Center is open Monday through Friday from 7AM to 7PM.  You can also call the HEALTH HOTLINE open 24 hours/day, 7 days/week and speak to a nurse at [phone], or toll free at [phone].    A registered nurse may call you a few days after your surgery to see how you are doing after your procedure.    MEDICATIONS: Resume taking daily medication.  Take prescribed pain medication with food.  If no medication is prescribed, you may take non-aspirin pain medication if needed.  PAIN MEDICATION CAN BE VERY CONSTIPATING.  Take a stool softener or laxative such as senokot, pericolace, or milk of magnesia if needed.    Prescription given for oxycodone.  Last pain medication given at 3:23 pm, next dose at 7:23 PM.    If your physician has prescribed pain medication that includes Acetaminophen (Tylenol), do not take additional Acetaminophen (Tylenol) while taking the prescribed medication.    Depression / Suicide Risk    As you are discharged from this [facility] facility, it is important to learn how to keep safe from harming yourself.    Recognize the warning signs:  · Abrupt changes in personality, positive or negative- including increase in energy   · Giving away possessions  · Change in eating patterns- significant weight changes-  positive or negative  · Change in sleeping patterns- unable to sleep or sleeping all the time   · Unwillingness or inability to communicate  · Depression  · Unusual sadness, discouragement and loneliness  · Talk of wanting to die  · Neglect of personal appearance   · Rebelliousness- reckless  behavior  · Withdrawal from people/activities they love  · Confusion- inability to concentrate     If you or a loved one observes any of these behaviors or has concerns about self-harm, here's what you can do:  · Talk about it- your feelings and reasons for harming yourself  · Remove any means that you might use to hurt yourself (examples: pills, rope, extension cords, firearm)  · Get professional help from the community (Mental Health, Substance Abuse, psychological counseling)  · Do not be alone:Call your Safe Contact- someone whom you trust who will be there for you.  · Call your local CRISIS HOTLINE 733-0224 or 522-271-4344  · Call your local Children's Mobile Crisis Response Team [place] (407) 450-5482 or www.iRidge  · Call the toll free National Suicide Prevention Hotlines   · National Suicide Prevention Lifeline 610-450-BTLX (9354)  · National [employer] Line Network 800-SUICIDE (228-4468)      Deep Vein Thrombosis  A deep vein thrombosis (DVT) is a blood clot (thrombus) that usually occurs in a deep, larger vein of the lower leg or the pelvis, or in an upper extremity such as the arm. These are dangerous and can lead to serious and even life-threatening complications if the clot travels to the lungs.  A DVT can damage the valves in your leg veins so that instead of flowing upward, the blood pools in the lower leg. This is called post-thrombotic syndrome, and it can result in pain, swelling, discoloration, and sores on the leg.  What are the causes?  A DVT is caused by the formation of a blood clot in your leg, pelvis, or arm. Usually, several things contribute to the formation of blood clots. A clot may develop when:  · Your blood flow slows down.  · Your vein becomes damaged in some way.  · You have a condition that makes your blood clot more easily.  What increases the risk?  A DVT is more likely to develop in:  · People who are older, especially over 60 years of age.  · People who are overweight  (obese).  · People who sit or lie still for a long time, such as during long-distance travel (over 4 hours), bed rest, hospitalization, or during recovery from certain medical conditions like a stroke.  · People who do not engage in much physical activity (sedentary lifestyle).  · People who have chronic breathing disorders.  · People who have a personal or family history of blood clots or blood clotting disease.  · People who have peripheral vascular disease (PVD), diabetes, or some types of cancer.  · People who have heart disease, especially if the person had a recent heart attack or has congestive heart failure.  · People who have neurological diseases that affect the legs (leg paresis).  · People who have had a traumatic injury, such as breaking a hip or leg.  · People who have recently had major or lengthy surgery, especially on the hip, knee, or abdomen.  · People who have had a central line placed inside a large vein.  · People who take medicines that contain the hormone estrogen. These include birth control pills and hormone replacement therapy.  · Pregnancy or during childbirth or the postpartum period.  · Long plane flights (over 8 hours).  What are the signs or symptoms?     Symptoms of a DVT can include:  · Swelling of your leg or arm, especially if one side is much worse.  · Warmth and redness of your leg or arm, especially if one side is much worse.  · Pain in your arm or leg. If the clot is in your leg, symptoms may be more noticeable or worse when you stand or walk.  · A feeling of pins and needles, if the clot is in the arm.  The symptoms of a DVT that has traveled to the lungs (pulmonary embolism, PE) usually start suddenly and include:  · Shortness of breath while active or at rest.  · Coughing or coughing up blood or blood-tinged mucus.  · Chest pain that is often worse with deep breaths.  · Rapid or irregular heartbeat.  · Feeling light-headed or dizzy.  · Fainting.  · Feeling  anxious.  · Sweating.  There may also be pain and swelling in a leg if that is where the blood clot started.  These symptoms may represent a serious problem that is an emergency. Do not wait to see if the symptoms will go away. Get medical help right away. Call your local emergency services (911 in the [place] Do not drive yourself to the hospital.     How is this prevented?  Take these actions to decrease your risk of developing another DVT:  · Exercise regularly. For at least 30 minutes every day, engage in:  ¨ Activity that involves moving your arms and legs.  ¨ Activity that encourages good blood flow through your body by increasing your heart rate.  · Exercise your arms and legs every hour during long-distance travel (over 4 hours). Drink plenty of water and avoid drinking alcohol while traveling.  · Avoid sitting or lying in bed for long periods of time without moving your legs.  · Maintain a weight that is appropriate for your height. Ask your health care provider what weight is healthy for you.  · If you are a woman who is over 35 years of age, avoid unnecessary use of medicines that contain estrogen. These include birth control pills.  · Do not smoke, especially if you take estrogen medicines. If you need help quitting, ask your health care provider.  If you are hospitalized, prevention measures may include:  · Early walking after surgery, as soon as your health care provider says that it is safe.  · Receiving anticoagulants to prevent blood clots. If you cannot take anticoagulants, other options may be available, such as wearing compression stockings or using different types of devices.  Get help right away if:  · You have new or increased pain, swelling, or redness in an arm or leg.  · You have numbness or tingling in an arm or leg.  · You have shortness of breath while active or at rest.  · You have chest pain.  · You have a rapid or irregular heartbeat.  · You feel light-headed or dizzy.  · You cough up  blood.  · You notice blood in your vomit, bowel movement, or urine.  These symptoms may represent a serious problem that is an emergency. Do not wait to see if the symptoms will go away. Get medical help right away. Call your local emergency services (911 in the [place] Do not drive yourself to the hospital.   This information is not intended to replace advice given to you by your health care provider. Make sure you discuss any questions you have with your health care provider.  Document Released: 12/18/2006 Document Revised: 05/25/2017 Document Reviewed: 04/13/2016  Elsevier Interactive Patient Education © 2017 [employer]

## 2019-06-27 ENCOUNTER — HOSPITAL ENCOUNTER (EMERGENCY)
Facility: MEDICAL CENTER | Age: 49
End: 2019-06-28
Attending: EMERGENCY MEDICINE
Payer: COMMERCIAL

## 2019-06-27 ENCOUNTER — APPOINTMENT (OUTPATIENT)
Dept: RADIOLOGY | Facility: MEDICAL CENTER | Age: 49
End: 2019-06-27
Attending: EMERGENCY MEDICINE
Payer: COMMERCIAL

## 2019-06-27 DIAGNOSIS — G89.18 POST-OPERATIVE PAIN: ICD-10-CM

## 2019-06-27 DIAGNOSIS — J98.11 ATELECTASIS OF BOTH LUNGS: ICD-10-CM

## 2019-06-27 DIAGNOSIS — R07.81 PLEURITIC CHEST PAIN: ICD-10-CM

## 2019-06-27 PROCEDURE — 93005 ELECTROCARDIOGRAM TRACING: CPT | Performed by: EMERGENCY MEDICINE

## 2019-06-27 PROCEDURE — 99284 EMERGENCY DEPT VISIT MOD MDM: CPT

## 2019-06-28 ENCOUNTER — APPOINTMENT (OUTPATIENT)
Dept: RADIOLOGY | Facility: MEDICAL CENTER | Age: 49
End: 2019-06-28
Attending: EMERGENCY MEDICINE
Payer: COMMERCIAL

## 2019-06-28 VITALS
TEMPERATURE: 97.6 F | HEART RATE: 83 BPM | WEIGHT: 241.62 LBS | DIASTOLIC BLOOD PRESSURE: 78 MMHG | OXYGEN SATURATION: 99 % | RESPIRATION RATE: 15 BRPM | BODY MASS INDEX: 40.21 KG/M2 | SYSTOLIC BLOOD PRESSURE: 125 MMHG

## 2019-06-28 LAB
ALBUMIN SERPL BCP-MCNC: 3.6 G/DL (ref 3.2–4.9)
ALBUMIN/GLOB SERPL: 1.4 G/DL
ALP SERPL-CCNC: 84 U/L (ref 30–99)
ALT SERPL-CCNC: 35 U/L (ref 2–50)
ANION GAP SERPL CALC-SCNC: 10 MMOL/L (ref 0–11.9)
APTT PPP: 24.6 SEC (ref 24.7–36)
AST SERPL-CCNC: 38 U/L (ref 12–45)
BASOPHILS # BLD AUTO: 0.5 % (ref 0–1.8)
BASOPHILS # BLD: 0.04 K/UL (ref 0–0.12)
BILIRUB SERPL-MCNC: 0.5 MG/DL (ref 0.1–1.5)
BUN SERPL-MCNC: 12 MG/DL (ref 8–22)
CALCIUM SERPL-MCNC: 8.7 MG/DL (ref 8.4–10.2)
CHLORIDE SERPL-SCNC: 102 MMOL/L (ref 96–112)
CO2 SERPL-SCNC: 26 MMOL/L (ref 20–33)
CREAT SERPL-MCNC: 0.83 MG/DL (ref 0.5–1.4)
EKG IMPRESSION: NORMAL
EOSINOPHIL # BLD AUTO: 0.01 K/UL (ref 0–0.51)
EOSINOPHIL NFR BLD: 0.1 % (ref 0–6.9)
ERYTHROCYTE [DISTWIDTH] IN BLOOD BY AUTOMATED COUNT: 41.4 FL (ref 35.9–50)
GLOBULIN SER CALC-MCNC: 2.5 G/DL (ref 1.9–3.5)
GLUCOSE SERPL-MCNC: 100 MG/DL (ref 65–99)
HCT VFR BLD AUTO: 36.7 % (ref 37–47)
HGB BLD-MCNC: 12.1 G/DL (ref 12–16)
IMM GRANULOCYTES # BLD AUTO: 0.04 K/UL (ref 0–0.11)
IMM GRANULOCYTES NFR BLD AUTO: 0.5 % (ref 0–0.9)
INR PPP: 0.88 (ref 0.87–1.13)
LIPASE SERPL-CCNC: 27 U/L (ref 7–58)
LYMPHOCYTES # BLD AUTO: 3.15 K/UL (ref 1–4.8)
LYMPHOCYTES NFR BLD: 36 % (ref 22–41)
MCH RBC QN AUTO: 27.5 PG (ref 27–33)
MCHC RBC AUTO-ENTMCNC: 33 G/DL (ref 33.6–35)
MCV RBC AUTO: 83.4 FL (ref 81.4–97.8)
MONOCYTES # BLD AUTO: 0.74 K/UL (ref 0–0.85)
MONOCYTES NFR BLD AUTO: 8.4 % (ref 0–13.4)
NEUTROPHILS # BLD AUTO: 4.78 K/UL (ref 2–7.15)
NEUTROPHILS NFR BLD: 54.5 % (ref 44–72)
NRBC # BLD AUTO: 0 K/UL
NRBC BLD-RTO: 0 /100 WBC
PLATELET # BLD AUTO: 194 K/UL (ref 164–446)
PMV BLD AUTO: 9.1 FL (ref 9–12.9)
POTASSIUM SERPL-SCNC: 3.8 MMOL/L (ref 3.6–5.5)
PROT SERPL-MCNC: 6.1 G/DL (ref 6–8.2)
PROTHROMBIN TIME: 12 SEC (ref 12–14.6)
RBC # BLD AUTO: 4.4 M/UL (ref 4.2–5.4)
SODIUM SERPL-SCNC: 138 MMOL/L (ref 135–145)
TROPONIN I SERPL-MCNC: <0.02 NG/ML (ref 0–0.04)
WBC # BLD AUTO: 8.8 K/UL (ref 4.8–10.8)

## 2019-06-28 PROCEDURE — 700111 HCHG RX REV CODE 636 W/ 250 OVERRIDE (IP): Performed by: EMERGENCY MEDICINE

## 2019-06-28 PROCEDURE — 85610 PROTHROMBIN TIME: CPT

## 2019-06-28 PROCEDURE — 85730 THROMBOPLASTIN TIME PARTIAL: CPT

## 2019-06-28 PROCEDURE — 83690 ASSAY OF LIPASE: CPT

## 2019-06-28 PROCEDURE — 700117 HCHG RX CONTRAST REV CODE 255: Performed by: EMERGENCY MEDICINE

## 2019-06-28 PROCEDURE — 85025 COMPLETE CBC W/AUTO DIFF WBC: CPT

## 2019-06-28 PROCEDURE — 96372 THER/PROPH/DIAG INJ SC/IM: CPT

## 2019-06-28 PROCEDURE — 84484 ASSAY OF TROPONIN QUANT: CPT

## 2019-06-28 PROCEDURE — 80053 COMPREHEN METABOLIC PANEL: CPT

## 2019-06-28 PROCEDURE — 71275 CT ANGIOGRAPHY CHEST: CPT

## 2019-06-28 RX ORDER — MORPHINE SULFATE 10 MG/ML
6 INJECTION, SOLUTION INTRAMUSCULAR; INTRAVENOUS ONCE
Status: DISCONTINUED | OUTPATIENT
Start: 2019-06-28 | End: 2019-06-28

## 2019-06-28 RX ORDER — MORPHINE SULFATE 10 MG/ML
6 INJECTION, SOLUTION INTRAMUSCULAR; INTRAVENOUS ONCE
Status: COMPLETED | OUTPATIENT
Start: 2019-06-28 | End: 2019-06-28

## 2019-06-28 RX ORDER — ONDANSETRON 4 MG/1
4 TABLET, ORALLY DISINTEGRATING ORAL ONCE
Status: COMPLETED | OUTPATIENT
Start: 2019-06-28 | End: 2019-06-28

## 2019-06-28 RX ADMIN — IOHEXOL 72 ML: 350 INJECTION, SOLUTION INTRAVENOUS at 02:34

## 2019-06-28 RX ADMIN — MORPHINE SULFATE 6 MG: 10 INJECTION INTRAVENOUS at 01:36

## 2019-06-28 RX ADMIN — ONDANSETRON 4 MG: 4 TABLET, ORALLY DISINTEGRATING ORAL at 01:33

## 2019-06-28 NOTE — ED TRIAGE NOTES
"Pt to room via rolfrmaximiliano. A & O, privacy, monitored, plan of care and support given. Family at bedside.    Pt states had hysterectomy with bladder scope on 6/24/19. Pt states had tubal ligation and uterine biopsy a week before that.    Pt states pain is 9/10 to RUQ \"It feels like when I had pleurisy before.\"    Pt is using her medications as prescribed but states she has been using them around the clock d/t constant pain.  Pt states she has been passing formed stools and clear/yellow urine.      "

## 2019-06-28 NOTE — ED NOTES
Pt given d/c instructions, questions answered and support given. VSS. Pt up steady self.   Pt being driven home by family with whom she lives.  Pt has f/u appointment tomorrow at 11am with PCP.   Pt will return if other complications.  IV removed without problem.

## 2019-06-28 NOTE — ED PROVIDER NOTES
ED Provider Note    ED Provider Note    Scribed for Evon George MD by Evon George. 6/27/2019, 11:47 PM.    Primary care provider: Tawny Jeronimo M.D.  Means of arrival: Private  History obtained from: Patient and famiy  History limited by: None    CHIEF COMPLAINT  Chief Complaint   Patient presents with   • Post-Op Pain     tubal ligation with uterine biposy followed by hysterectomy and bladder exploratory       HPI  Gladis Tovar is a 48 y.o. female who presents to the Emergency Department for evaluation of pleuritic discomfort to the right costal margin/right upper quadrant.  This been for the last 3 days.  Of note patient is status post bilateral tubal ligation and cystoscopy on the 24th of this month.  She notes pain worsening since despite ibuprofen and oxycodone.  No fever, no cough, pain is pleuritic and also more so with movement.  No syncope, no precordial chest pain.  She does note mild abdominal bloating and discomfort but this is well controlled with her pain medications.  She has never really had any similar such symptoms, no recent travel, no DVT/PE history.    REVIEW OF SYSTEMS  Pertinent positives include status post tubal ligation with uterine biopsy on the 24th, having normal bowel movements, abdominal cramping noted, pleuritic discomfort to right upper chest. Pertinent negatives include no fever, no vomiting, no trauma history.  All other systems reviewed and negative.    PAST MEDICAL HISTORY   has a past medical history of Asthma (05/17/2019); Depression; Snoring; and Urinary incontinence (05/17/2019).    SURGICAL HISTORY   has a past surgical history that includes tonsillectomy; breast reconstruction (1992); appendectomy (1994); cervical cerclage (1994/1996/1999); shoulder surgery (Bilateral, 2012); pelvic examination w anesth (5/23/2019); lap,diagnostic abdomen (5/23/2019); hysteroscopy,dx,sep proc (5/23/2019); salpingectomy (Bilateral, 5/23/2019); hysteroscopy with video  operative (5/23/2019); hysteroscopy with myosure (5/23/2019); pelvic examination w anesth (N/A, 6/24/2019); lap,diagnostic abdomen (6/24/2019); vaginal hysterectomy scope total (6/24/2019); and cystoscopy (6/24/2019).    SOCIAL HISTORY  Social History   Substance Use Topics   • Smoking status: Never Smoker   • Smokeless tobacco: Never Used   • Alcohol use Yes      Comment: 1 per month      History   Drug Use No       FAMILY HISTORY  Family History   Problem Relation Age of Onset   • Lung Disease Mother    • Arthritis Mother    • Diabetes Mother    • Hypertension Father    • Alcohol/Drug Brother    • Arthritis Maternal Grandmother    • Cancer Maternal Grandmother    • Diabetes Maternal Grandmother        CURRENT MEDICATIONS  Home Medications     Reviewed by Chico Dasilva RJaronNJaron (Registered Nurse) on 06/27/19 at 2241  Med List Status: Complete   Medication Last Dose Status   Albuterol Sulfate 108 (90 Base) MCG/ACT AEROSOL POWDER, BREATH ACTIVATED  Active   buPROPion (WELLBUTRIN) 100 MG Tab 6/27/2019 Active   oxyCODONE-acetaminophen (PERCOCET) 5-325 MG Tab 6/27/2019 Active   vitamin D (CHOLECALCIFEROL) 1000 UNIT Tab 6/27/2019 Active                ALLERGIES  Allergies   Allergen Reactions   • Codeine Unspecified     Heart stops  RXN=age 3       PHYSICAL EXAM  VITAL SIGNS: /80   Pulse 84   Temp 36.4 °C (97.6 °F)   Resp 17   Wt 109.6 kg (241 lb 10 oz)   LMP  (LMP Unknown)   SpO2 97%   BMI 40.21 kg/m²     General: Alert, mild acute distress, anxious, appears uncomfortable  Skin: Warm, dry, normal for ethnicity  Head: Normocephalic, atraumatic  Neck: Trachea midline, no tenderness  Eye: PERRL, normal conjunctiva  ENMT: Oral mucosa pink and mildly dry, no pharyngeal erythema or exudate  Cardiovascular: Regular rate and rhythm, No murmur, Normal peripheral perfusion.  Reproducible tenderness to the right costal margin, no step-off.  Respiratory: Lungs CTA, respirations are non-labored, breath sounds are  equal  Gastrointestinal: Soft, negative Salinas sign, nontender, no guarding, rebound, no rigidity.  Surgical scars including the umbilicus and bilateral lower quadrants are clean, dry, intact and covered in dressing and Steri-Strips.  Musculoskeletal: No swelling, no deformity  Neurological: Alert and oriented to person, place, time, and situation  Lymphatics: No lymphadenopathy  Psychiatric: Cooperative, moderately anxious but otherwise appropriate mood & affect      DIAGNOSTIC STUDIES/PROCEDURES    LABS  Results for orders placed or performed during the hospital encounter of 06/27/19   CBC w/ Differential   Result Value Ref Range    WBC 8.8 4.8 - 10.8 K/uL    RBC 4.40 4.20 - 5.40 M/uL    Hemoglobin 12.1 12.0 - 16.0 g/dL    Hematocrit 36.7 (L) 37.0 - 47.0 %    MCV 83.4 81.4 - 97.8 fL    MCH 27.5 27.0 - 33.0 pg    MCHC 33.0 (L) 33.6 - 35.0 g/dL    RDW 41.4 35.9 - 50.0 fL    Platelet Count 194 164 - 446 K/uL    MPV 9.1 9.0 - 12.9 fL    Neutrophils-Polys 54.50 44.00 - 72.00 %    Lymphocytes 36.00 22.00 - 41.00 %    Monocytes 8.40 0.00 - 13.40 %    Eosinophils 0.10 0.00 - 6.90 %    Basophils 0.50 0.00 - 1.80 %    Immature Granulocytes 0.50 0.00 - 0.90 %    Nucleated RBC 0.00 /100 WBC    Neutrophils (Absolute) 4.78 2.00 - 7.15 K/uL    Lymphs (Absolute) 3.15 1.00 - 4.80 K/uL    Monos (Absolute) 0.74 0.00 - 0.85 K/uL    Eos (Absolute) 0.01 0.00 - 0.51 K/uL    Baso (Absolute) 0.04 0.00 - 0.12 K/uL    Immature Granulocytes (abs) 0.04 0.00 - 0.11 K/uL    NRBC (Absolute) 0.00 K/uL   Complete Metabolic Panel (CMP)   Result Value Ref Range    Sodium 138 135 - 145 mmol/L    Potassium 3.8 3.6 - 5.5 mmol/L    Chloride 102 96 - 112 mmol/L    Co2 26 20 - 33 mmol/L    Anion Gap 10.0 0.0 - 11.9    Glucose 100 (H) 65 - 99 mg/dL    Bun 12 8 - 22 mg/dL    Creatinine 0.83 0.50 - 1.40 mg/dL    Calcium 8.7 8.4 - 10.2 mg/dL    AST(SGOT) 38 12 - 45 U/L    ALT(SGPT) 35 2 - 50 U/L    Alkaline Phosphatase 84 30 - 99 U/L    Total Bilirubin 0.5  0.1 - 1.5 mg/dL    Albumin 3.6 3.2 - 4.9 g/dL    Total Protein 6.1 6.0 - 8.2 g/dL    Globulin 2.5 1.9 - 3.5 g/dL    A-G Ratio 1.4 g/dL   Troponin STAT   Result Value Ref Range    Troponin I <0.02 0.00 - 0.04 ng/mL   Lipase   Result Value Ref Range    Lipase 27 7 - 58 U/L   Prothrombin Time   Result Value Ref Range    PT 12.0 12.0 - 14.6 sec    INR 0.88 0.87 - 1.13   APTT   Result Value Ref Range    APTT 24.6 (L) 24.7 - 36.0 sec   ESTIMATED GFR   Result Value Ref Range    GFR If African American >60 >60 mL/min/1.73 m 2    GFR If Non African American >60 >60 mL/min/1.73 m 2   EKG   Result Value Ref Range    Report       [facility]    Test Date:  2019  Pt Name:    SHILPI STRONG                    Department: [facility]  MRN:        [id number]                      [facility]  Gender:     Female                       Technician: HRS  :        [id number]                   Requested By:BRIGIDA LAND  Order #:    [id number]                    Reading MD: BRIGIDA LAND MD    Measurements  Intervals                                Axis  Rate:       78                           P:          59  TN:         154                          QRS:        20  QRSD:       87                           T:          37  QT:         373  QTc:        425    Interpretive Statements  Sinus rhythm  Compared to ECG 2019 16:00:27  No significant changes    Electronically Signed On 2019 3:01:17 PDT by BRIGIDA LAND MD       All labs reviewed by me.    EKG  12 Lead EKG obtained at 2215 and interpreted by me to show:  Rhythm: Normal sinus rhythm   Rate: 78  Axis: Normal  Intervals: Normal  Q Waves: Normal  No diagnostic ST segment elevation    Clinical Impression: Normal EKG  Compared to 2019, no change    RADIOLOGY  CT-CTA CHEST PULMONARY ARTERY W/ RECONS   Final Result      Negative pulmonary CTA      Calcified right middle lobe nodules are consistent with  remote granulomatous disease        The radiologist's interpretation of all radiological studies have been reviewed by me.    COURSE & MEDICAL DECISION MAKING  Pertinent Labs & Imaging studies reviewed. (See chart for details)    11:47 PM - Patient seen and examined at bedside. Patient will be treated with morphine and Zofran. Ordered cardiac work-up as well as CT imaging of chest to evaluate her symptoms. The differential diagnoses include but are not limited to: Pulmonary embolism, pleurisy, pneumonia    0120: Staff still unable to place an IV.  Have ordered IM analgesia.  I requested ultrasound-guided IV.    0151: Successful placement of IV by additional staff.  I have added coags.    0223: Went to reassess, patient currently in CT.  I spoke with family and updated with unremarkable work-up thus far.  Awaiting imaging results.    0255: Patient reassessed, feeling much better, relieved here of unremarkable studies.    Decision Making:  This is a 48 y.o. year old female who presents with pleuritic discomfort to right costal margin/right upper quadrant.  She has no actual abdominal tenderness and a negative Salinas sign.  Patient's incision sites from surgery on the 24th are clean, dry, intact and closed with Steri-Strips.  No fever, no leukocytosis, no evidence of septicemia.  Given recent surgery and pleuritic discomfort I am suspicious for potential PEs versus pleurisy or atelectasis.  CT imaging is indicated.  EKG is unremarkable, troponin unremarkable, no precordial chest pain, this would not be consistent with ACS.  Thankfully CTA demonstrates no pulmonary Aisha there is bibasilar atelectasis noted.  Given this presentation in the postoperative setting I suspect likely pleurisy.  Patient is already on NSAIDs, taking ibuprofen 800.  Recommend continue this.  No evidence of any operative issues, her wounds are clean, dry, intact, and there is no leukocytosis nor fever.  She is well-appearing and nontoxic with  no evidence of surgical abdominal process.  She is comfortable with follow-up with her surgeon tomorrow as planned.    The patient will return for new or worsening symptoms and is stable at the time of discharge.    Patient has had high blood pressure while in the emergency department, felt likely secondary to medical condition. Counseled patient to monitor blood pressure at home and follow up with primary care physician.     DISPOSITION:  Patient will be discharged home in stable condition.    FOLLOW UP:  [address] [place] [id number] [phone]    Schedule an appointment as soon as possible for a visit in 3 days        OUTPATIENT MEDICATIONS:  New Prescriptions    No medications on file         FINAL IMPRESSION  1. Pleuritic chest pain    2. Post-operative pain    3. Atelectasis of both lungs          Evon AGUAYO (Adrian), am scribing for, and in the presence of, Evon George MD.    Electronically signed by: Evon George (Bettye), 6/27/2019    Evon AGUAYO MD personally performed the services described in this documentation, as scribed by Evon George in my presence, and it is both accurate and complete    The note accurately reflects work and decisions made by me.  Evon George  6/28/2019  3:03 AM

## 2019-06-28 NOTE — ED NOTES
After 4 peripheral IV attempts US assisted IV was obtained in R AC.   Pt is emotionally exhausted after all attemtps (Pt has severe needle anxiety).  Family remains at bedside.   Radiology informed and pt is ready for CT scan.    Pt states pain is still 7+10 to RUQ/ rib area and pt states post surgical pain increasing.  Pain meds were given and pt informed of expectations for pain coverage.

## 2019-07-22 ENCOUNTER — TELEPHONE (OUTPATIENT)
Dept: MEDICAL GROUP | Age: 49
End: 2019-07-22

## 2019-07-22 NOTE — TELEPHONE ENCOUNTER
Phone Number Called: [phone] (home)       Call outcome: left message for patient to call back regarding message below    Message: Called pt. No answer. LVM: Per Dr. Jeronimo pt appt needs to be rescheduled on Tues 8/27/19 for an earlier time or different date.

## 2019-08-20 ENCOUNTER — HOSPITAL ENCOUNTER (EMERGENCY)
Facility: MEDICAL CENTER | Age: 49
End: 2019-08-20
Attending: EMERGENCY MEDICINE
Payer: COMMERCIAL

## 2019-08-20 VITALS
HEIGHT: 65 IN | HEART RATE: 98 BPM | TEMPERATURE: 97.7 F | SYSTOLIC BLOOD PRESSURE: 134 MMHG | BODY MASS INDEX: 41.03 KG/M2 | RESPIRATION RATE: 20 BRPM | WEIGHT: 246.25 LBS | OXYGEN SATURATION: 96 % | DIASTOLIC BLOOD PRESSURE: 89 MMHG

## 2019-08-20 DIAGNOSIS — F41.0 PANIC ATTACK: ICD-10-CM

## 2019-08-20 PROCEDURE — 99284 EMERGENCY DEPT VISIT MOD MDM: CPT

## 2019-08-20 RX ORDER — LORAZEPAM 1 MG/1
1 TABLET ORAL EVERY 8 HOURS PRN
Qty: 15 TAB | Refills: 0 | Status: SHIPPED | OUTPATIENT
Start: 2019-08-20 | End: 2019-08-24

## 2019-08-21 NOTE — ED NOTES
Patient verbalized understanding to plan of care and discharge papers. Patient in stable condition. No signs of distress. Patient pain free. Patient ambulatory out of ED to personal vehicle with stable gait.

## 2019-08-21 NOTE — ED TRIAGE NOTES
"Patient presents to triage weeping stating \"Im so overwhelmed. My son is an addict, both my parents are sick, my other son just enlisted, my brother is a drunk, and work is very stressful.\" She denies SI or HI but just repeats that she is overwhelmed.  "

## 2019-08-21 NOTE — ED PROVIDER NOTES
"ED Provider Note    CHIEF COMPLAINT  Chief Complaint   Patient presents with   • Anxiety       HPI  Gladis Tovar is a 48 y.o. female who presents for evaluation of severe anxiety.  The patient apparently has been under a lot of stress at work and she has some family stressors as well.  Apparently her son is enlisting in the  and she is worried about him.  She is a history of chronic depression and takes Wellbutrin.  Her PCP manages the meds and she also has a counselor.  She specifically denies suicidality auditory visual hallucinations.  She is never been hospitalized at a psychiatric facility has never been suicidal.  She does not have access to firearms and she does not endorse any ongoing suicidality    REVIEW OF SYSTEMS  See HPI for further details.  No night sweats weight loss all other systems are negative.     PAST MEDICAL HISTORY  Past Medical History:   Diagnosis Date   • Urinary incontinence 05/17/2019    \"mild\"   • Asthma 05/17/2019   • Depression    • Snoring     no sleep study       FAMILY HISTORY  Noncontributory    SOCIAL HISTORY  Social History     Socioeconomic History   • Marital status:      Spouse name: Not on file   • Number of children: Not on file   • Years of education: Not on file   • Highest education level: Not on file   Occupational History   • Not on file   Social Needs   • Financial resource strain: Not on file   • Food insecurity:     Worry: Not on file     Inability: Not on file   • Transportation needs:     Medical: Not on file     Non-medical: Not on file   Tobacco Use   • Smoking status: Never Smoker   • Smokeless tobacco: Never Used   Substance and Sexual Activity   • Alcohol use: Yes     Comment: 1 per month   • Drug use: No   • Sexual activity: Yes     Partners: Male     Birth control/protection: Pill   Lifestyle   • Physical activity:     Days per week: Not on file     Minutes per session: Not on file   • Stress: Not on file   Relationships   • Social connections: "     Talks on phone: Not on file     Gets together: Not on file     Attends Sikhism service: Not on file     Active member of club or organization: Not on file     Attends meetings of clubs or organizations: Not on file     Relationship status: Not on file   • Intimate partner violence:     Fear of current or ex partner: Not on file     Emotionally abused: Not on file     Physically abused: Not on file     Forced sexual activity: Not on file   Other Topics Concern   • Not on file   Social History Narrative   • Not on file       SURGICAL HISTORY  Past Surgical History:   Procedure Laterality Date   • PB PELVIC EXAMINATION W ANESTH N/A 6/24/2019    Procedure: EXAM UNDER ANESTHESIA, PELVIS;  Surgeon: Edwina Miranda M.D.;  Location: SURGERY SAME DAY [facility]S;  Service: Gynecology   • PB LAP,DIAGNOSTIC ABDOMEN  6/24/2019    Procedure: PELVISCOPY;  Surgeon: Edwina Miranda M.D.;  Location: SURGERY SAME DAY [facility] OR[facility];  Service: Gynecology   • VAGINAL HYSTERECTOMY SCOPE TOTAL  6/24/2019    Procedure: HYSTERECTOMY, TOTAL, VAGINAL, LAPAROSCOPY-ASSISTED;  Surgeon: Edwina Miranda M.D.;  Location: SURGERY SAME DAY [facility];  Service: Gynecology   • CYSTOSCOPY  6/24/2019    Procedure: CYSTOSCOPY;  Surgeon: Edwina Miranda M.D.;  Location: SURGERY SAME DAY [facility];  Service: Gynecology   • PB PELVIC EXAMINATION W ANESTH  5/23/2019    Procedure: EXAM UNDER ANESTHESIA, PELVIS;  Surgeon: Edwina Miranda M.D.;  Location: SURGERY SAME DAY [facility];  Service: Gynecology   • PB LAP,DIAGNOSTIC ABDOMEN  5/23/2019    Procedure: PELVISCOPY;  Surgeon: Edwina Miranda M.D.;  Location: SURGERY SAME DAY [facility];  Service: Gynecology   • PB HYSTEROSCOPY,DX,SEP PROC  5/23/2019    Procedure: HYSTEROSCOPY, DIAGNOSTIC- AND EXCISION OF POLY/FIBROID AND ENDOMETRIAL CURETTAGE;  Surgeon: Edwina Miranda M.D.;  Location: SURGERY SAME DAY [facility];  Service: Gynecology  "  • SALPINGECTOMY Bilateral 5/23/2019    Procedure: SALPINGECTOMY;  Surgeon: Edwina Miranda M.D.;  Location: SURGERY SAME DAY [facility];  Service: Gynecology   • HYSTEROSCOPY WITH VIDEO OPERATIVE  5/23/2019    Procedure: HYSTEROSCOPY, WITH VIDEO IMAGING;  Surgeon: Edwina Miranda M.D.;  Location: SURGERY SAME DAY [facility];  Service: Gynecology   • HYSTEROSCOPY WITH MYOSURE  5/23/2019    Procedure: HYSTEROSCOPY, WITH TISSUE REMOVAL, USING HYSTEROSCOPIC ROTATING CUTTER BLADE;  Surgeon: Edwina Miranda M.D.;  Location: SURGERY SAME DAY [facility];  Service: Gynecology   • SHOULDER SURGERY Bilateral 2012   • APPENDECTOMY  1994   • BREAST RECONSTRUCTION  1992    REDUCTION   • CERVICAL CERCLAGE  1994/1996/1999    x 3   • TONSILLECTOMY         CURRENT MEDICATIONS  Home Medications    **Home medications have not yet been reviewed for this encounter**     Wellbutrin    ALLERGIES  Allergies   Allergen Reactions   • Codeine Unspecified     Heart stops  RXN=age 3       PHYSICAL EXAM  VITAL SIGNS: /90   Pulse (!) 110   Temp 36.6 °C (97.8 °F) (Temporal)   Resp (!) 21   Ht 1.651 m (5' 5\")   Wt 111.7 kg (246 lb 4.1 oz)   LMP  (LMP Unknown)   SpO2 95%   BMI 40.98 kg/m²       Constitutional: Well developed, Well nourished, No acute distress, Non-toxic appearance.   HENT: Normocephalic, Atraumatic, Bilateral external ears normal, Oropharynx moist, No oral exudates, Nose normal.   Eyes: PERRLA, EOMI, Conjunctiva normal, No discharge.   Neck: Normal range of motion, No tenderness, Supple, No stridor.   Cardiovascular: Normal heart rate, Normal rhythm, No murmurs, No rubs, No gallops.   Thorax & Lungs: Normal breath sounds, No respiratory distress, No wheezing, No chest tenderness.   Abdomen: Bowel sounds normal, Soft, No tenderness, No masses, No pulsatile masses.   Skin: Warm, Dry, No erythema, No rash.   Back: No tenderness, No CVA tenderness.   Extremities: Intact distal pulses, No " edema, No tenderness, No cyanosis, No clubbing.   Neurologic: Alert & oriented x 3, Normal motor function, Normal sensory function, No focal deficits noted.   Psychiatric: Tearful visibly anxious.  Patient has normal sensorium.  She has good insight she specifically denies homicidality or suicidality does not appear to have any delusions or evidence of psychosis.         COURSE & MEDICAL DECISION MAKING  Pertinent Labs & Imaging studies reviewed. (See chart for details)  Patient appears to have situational anxiety with mild overlying depression.  She does not have criteria to be placed on a legal hold.  I think she is a good candidate for short course of benzodiazepines.  She has a follow-up appoint with her PCP in a week which will be enough time to assess if she is getting better.    FINAL IMPRESSION  1.  1. Panic attack  LORazepam (ATIVAN) 1 MG Tab              Electronically signed by: Dandre Sweeney, 8/20/2019 9:05 PM

## 2019-08-24 ENCOUNTER — HOSPITAL ENCOUNTER (OUTPATIENT)
Dept: LAB | Facility: MEDICAL CENTER | Age: 49
End: 2019-08-24
Attending: INTERNAL MEDICINE
Payer: COMMERCIAL

## 2019-08-24 DIAGNOSIS — E55.9 VITAMIN D INSUFFICIENCY: ICD-10-CM

## 2019-08-24 DIAGNOSIS — F33.1 MODERATE EPISODE OF RECURRENT MAJOR DEPRESSIVE DISORDER (HCC): ICD-10-CM

## 2019-08-24 DIAGNOSIS — E78.00 HYPERCHOLESTEROLEMIA: ICD-10-CM

## 2019-08-24 LAB
25(OH)D3 SERPL-MCNC: 24 NG/ML (ref 30–100)
ALBUMIN SERPL BCP-MCNC: 4.1 G/DL (ref 3.2–4.9)
ALBUMIN/GLOB SERPL: 1.6 G/DL
ALP SERPL-CCNC: 103 U/L (ref 30–99)
ALT SERPL-CCNC: 27 U/L (ref 2–50)
ANION GAP SERPL CALC-SCNC: 7 MMOL/L (ref 0–11.9)
AST SERPL-CCNC: 25 U/L (ref 12–45)
BILIRUB SERPL-MCNC: 0.4 MG/DL (ref 0.1–1.5)
BUN SERPL-MCNC: 17 MG/DL (ref 8–22)
CALCIUM SERPL-MCNC: 9.3 MG/DL (ref 8.5–10.5)
CHLORIDE SERPL-SCNC: 107 MMOL/L (ref 96–112)
CHOLEST SERPL-MCNC: 172 MG/DL (ref 100–199)
CO2 SERPL-SCNC: 27 MMOL/L (ref 20–33)
CREAT SERPL-MCNC: 0.84 MG/DL (ref 0.5–1.4)
FASTING STATUS PATIENT QL REPORTED: NORMAL
GLOBULIN SER CALC-MCNC: 2.5 G/DL (ref 1.9–3.5)
GLUCOSE SERPL-MCNC: 107 MG/DL (ref 65–99)
HDLC SERPL-MCNC: 44 MG/DL
LDLC SERPL CALC-MCNC: 98 MG/DL
POTASSIUM SERPL-SCNC: 4.6 MMOL/L (ref 3.6–5.5)
PROT SERPL-MCNC: 6.6 G/DL (ref 6–8.2)
SODIUM SERPL-SCNC: 141 MMOL/L (ref 135–145)
TRIGL SERPL-MCNC: 152 MG/DL (ref 0–149)

## 2019-08-24 PROCEDURE — 36415 COLL VENOUS BLD VENIPUNCTURE: CPT

## 2019-08-24 PROCEDURE — 82306 VITAMIN D 25 HYDROXY: CPT

## 2019-08-24 PROCEDURE — 80053 COMPREHEN METABOLIC PANEL: CPT

## 2019-08-24 PROCEDURE — 80061 LIPID PANEL: CPT

## 2019-08-27 ENCOUNTER — OFFICE VISIT (OUTPATIENT)
Dept: MEDICAL GROUP | Age: 49
End: 2019-08-27
Payer: COMMERCIAL

## 2019-08-27 VITALS
TEMPERATURE: 96.8 F | SYSTOLIC BLOOD PRESSURE: 110 MMHG | BODY MASS INDEX: 40.75 KG/M2 | HEIGHT: 65 IN | HEART RATE: 88 BPM | WEIGHT: 244.6 LBS | DIASTOLIC BLOOD PRESSURE: 76 MMHG | OXYGEN SATURATION: 95 %

## 2019-08-27 DIAGNOSIS — F41.1 GENERALIZED ANXIETY DISORDER: ICD-10-CM

## 2019-08-27 DIAGNOSIS — E55.9 VITAMIN D INSUFFICIENCY: ICD-10-CM

## 2019-08-27 DIAGNOSIS — E78.00 HYPERCHOLESTEROLEMIA: ICD-10-CM

## 2019-08-27 DIAGNOSIS — E66.01 MORBID OBESITY WITH BMI OF 40.0-44.9, ADULT (HCC): ICD-10-CM

## 2019-08-27 DIAGNOSIS — F33.1 MODERATE EPISODE OF RECURRENT MAJOR DEPRESSIVE DISORDER (HCC): ICD-10-CM

## 2019-08-27 DIAGNOSIS — N30.10 INTERSTITIAL CYSTITIS: ICD-10-CM

## 2019-08-27 PROCEDURE — 99214 OFFICE O/P EST MOD 30 MIN: CPT | Performed by: INTERNAL MEDICINE

## 2019-08-27 RX ORDER — HYDROXYZINE HYDROCHLORIDE 25 MG/1
25 TABLET, FILM COATED ORAL 3 TIMES DAILY PRN
Qty: 30 TAB | Refills: 3 | Status: SHIPPED | OUTPATIENT
Start: 2019-08-27 | End: 2020-04-22

## 2019-08-27 RX ORDER — PENTOSAN POLYSULFATE SODIUM 100 MG/1
100 CAPSULE, GELATIN COATED ORAL 2 TIMES DAILY
Refills: 12 | COMMUNITY
Start: 2019-07-23 | End: 2020-09-28

## 2019-08-27 SDOH — HEALTH STABILITY: MENTAL HEALTH: HOW MANY STANDARD DRINKS CONTAINING ALCOHOL DO YOU HAVE ON A TYPICAL DAY?: 1 OR 2

## 2019-08-27 SDOH — HEALTH STABILITY: MENTAL HEALTH: HOW OFTEN DO YOU HAVE A DRINK CONTAINING ALCOHOL?: MONTHLY OR LESS

## 2019-08-27 SDOH — HEALTH STABILITY: MENTAL HEALTH: HOW OFTEN DO YOU HAVE 6 OR MORE DRINKS ON ONE OCCASION?: NEVER

## 2019-08-27 ASSESSMENT — PAIN SCALES - GENERAL: PAINLEVEL: NO PAIN

## 2019-08-27 NOTE — PROGRESS NOTES
Subjective:   Gladis Tovar is a 48 y.o. female here today for evaluation and management of:    Moderate episode of recurrent major depressive disorder (HCC)  Patient is taking Wellbutrin 100 mg twice daily currently. She feels her mood is stable with Wellbutrin. She denies side effects from taking it. Denies suicidal ideation or plan or homicidal ideation or plan.     Generalized anxiety disorder  Patient was recently seen in the ED for an anxiety attack. She presented to [facility] on 8/20/19 with severe anxiety. She apparently has been under a lot of stress at work and has been dealing with personal health issues as well as family stressors and health issues. Patient states that her parents health has been declining and she is currently dealing with her son who is experiencing addiction. She has a history of chronic depression and takes Wellbutrin 100 mg and was recently prescribed Lorazepam 1 mg when discharged from the ED. She feels the Lorazepam does help her anxiety. Patient has a counselor, Abdirahman, and states that she talks to him frequently. Discussed trying Hydroxyzine and patient would like to try this.     Vitamin D insufficiency  History of low vitamin D. Patient is taking Vitamin D 5,000 units daily which she has been compliant with taking for the past 5 months. She would like to try 5,000 units BID for a total of 70,000 units a week. I discussed the blood test with the patient in clinic today.    Results for GLADIS TOVAR (MRN [id number]) as of 8/27/2019 14:55   Ref. Range 8/24/2019 08:00   25-Hydroxy   Vitamin D 25 Latest Ref Range: 30 - 100 ng/mL 24 (L)       Hypercholesterolemia  Patient tries to control her cholesterol with diet and physical exercise. Her cholesterol levels are stable and improved other than still slightly elevated triglycerides. She will try to control her triglyceride with diet and physical exercise. Patient states that she has not changed her diet and she has  gained weight through the stressful situations she has been dealing with. I discussed the blood test with the patient in clinic today.    Results for SHILPI STRONG (MRN [id number]) as of 8/27/2019 14:55   Ref. Range 8/24/2019 08:00   Cholesterol,Tot Latest Ref Range: 100 - 199 mg/dL 172   Triglycerides Latest Ref Range: 0 - 149 mg/dL 152 (H)   HDL Latest Ref Range: >=40 mg/dL 44   LDL Latest Ref Range: <100 mg/dL 98       Interstitial cystitis  After her last clinic visit she had an annual check up with her OB/GYN which esulted in a lot of findings. Patient had bilateral salpingectomy and hysteroscopy on May 23, 2019.  Then she underwent hysterectomy and cystoscopy on the 24th of June. Three days after her second surgery she was seen in the ED for post-op complications with findings of bibasilar atelectasis and likely suspected pleurisy. She also followed up with her GYN after her surgery ad was found to have interstitial cystitis as a result of her hysterectomy.  She was referred to [facility] [place] and she was started on Elmiron 100 mg which she takes TID on an empty stomach. Patient states that she is still recovering from her surgeries and she is unsure if the medication is helping.  She has follow-up appointment with urologist in a few weeks.    Morbid obesity with BMI of 40.0-44.9, adult (HCC)  Patient has gained weight. She admits that she has not changed her diet or level of physical exercise. She will continue to try and control her weight through lifestyle modifications.       Current medicines (including changes today)  Current Outpatient Medications   Medication Sig Dispense Refill   • ELMIRON 100 MG Cap Take 100 mg by mouth 3 times a day.  12   • hydrOXYzine HCl (ATARAX) 25 MG Tab Take 1 Tab by mouth 3 times a day as needed for Anxiety. 30 Tab 3   • buPROPion (WELLBUTRIN) 100 MG Tab Take 1 Tab by mouth 2 times a day. 180 Tab 3   • vitamin D (CHOLECALCIFEROL) 1000 UNIT Tab Take 10,000 Units by mouth every day.    "  • Albuterol Sulfate 108 (90 Base) MCG/ACT AEROSOL POWDER, BREATH ACTIVATED Inhale 1-2 Puffs by mouth as needed.       No current facility-administered medications for this visit.      She  has a past medical history of Asthma (05/17/2019), Depression, Snoring, and Urinary incontinence (05/17/2019).    ROS   No chest pain, no shortness of breath, no abdominal pain.       Objective:     /76 (BP Location: Right arm, Patient Position: Sitting, BP Cuff Size: Adult long)   Pulse 88   Temp 36 °C (96.8 °F) (Temporal)   Ht 1.656 m (5' 5.2\")   Wt 110.9 kg (244 lb 9.6 oz)   SpO2 95%  Body mass index is 40.46 kg/m².     Physical Exam:  General: Alert, oriented and no acute distress.  Eye contact is good, speech goal directed, affect calm  HEENT: conjunctiva non-injected, sclera non-icteric.  Oral mucous membranes pink and moist with no lesions.  Pinna normal.   Lungs: Normal respiratory effort, clear to auscultation bilaterally with good excursion.  CV: regular rate and rhythm. No murmurs.  Abdomen: soft, non distended, nontender, Bowel sound normal.  Ext: no edema, color normal, vascularity normal, temperature normal      Assessment and Plan:   The following treatment plan was discussed     1. Moderate episode of recurrent major depressive disorder (HCC)  - Well-controlled. Continue Wellbutrin 100 mg twice daily.  - Discussed with patient regarding the use and side effects of medication as well as black box warning of suicidality risk with medication. Patient verbally understands. Recommend to call 911 or go to ER if patient has suicidal ideation or plan.  - Comp Metabolic Panel; Future    2. Generalized anxiety disorder  - New problem. Patient recently seen in the ED secondary to having severe anxiety attack. She was prescribed Lorazepam however she would like to try new medication.  - Discussed trying Hydroxyzine. I reviewed the risks and benefits as well as medication side effects of Hydroxyzine. She is " agreeable to try this medication and will stop if she experiences side effects.  - Advised to continue to follow with counselor as needed.  - Advised to be cautious for sedation effect of hydroxyzine and recommend not to take hydroxyzine with alcohol or other sedating medications. Recommend to avoid driving or operating heavy machinery when she feels drowsy or sleepy from taking medication.  - hydrOXYzine HCl (ATARAX) 25 MG Tab; Take 1 Tab by mouth 3 times a day as needed for Anxiety.  Dispense: 30 Tab; Refill: 3  - Comp Metabolic Panel; Future  - Lipid Profile; Future    3. Vitamin D insufficiency  - Not yet at goal. Discussed switching Vitamin D 5,000 units once daily to twice daily. She is agreeable with this plan and would like to recheck her vitamin D level in the future.   - VITAMIN D,25 HYDROXY; Future    4. Hypercholesterolemia  - Stable. She still has slightly high triglyceride. Continue to control with diet and exercise. Recheck lab 1-2 weeks before next follow up visit.  - Reviewed the risks and benefits of treatment and potential side effects of medication.  - Advised to eat low fat, low carbohydrate and high fiber diet as well as do cardio physical exercise regularly.  - Comp Metabolic Panel; Future  - Lipid Profile; Future  - TSH; Future  - FREE THYROXINE; Future    5. Interstitial cystitis  - Stable. Patient will continue to monitor her symptoms and follow with urology and OB/GYN.  - Continue Elmiron 100 mg TID on an empty stomach as instructed by urologist.    6. Morbid obesity with BMI of 40.0-44.9, adult (HCC)  - Patient identified as having weight management issue. Appropriate orders and counseling given.  - Advised to eat low fat, low carbohydrate and high fiber diet as well as do cardio physical exercise regularly.     7. Health Maintenance   - Patient due for colonoscopy and shingles vaccine. She will follow up with her local pharmacy for Shingrix vaccine as she understands that we do not have  this available in the clinic today due to shortage.  Patient states that she had colonoscopy with digestive health at age 40.  We will request the report from digestive [facility].      Followup: Return in about 3 months (around 11/27/2019), or if symptoms worsen or fail to improve, for Hyperlipidemia, Anxiety, Depression, Vitamin D insufficiency, Lab review.      Please note that this dictation was created using voice recognition software. I have made every reasonable attempt to correct obvious errors, but I expect that there may have unintended errors in text, spelling, punctuation, or grammar that I did not discover.    I, Irma Molina (Scribe), am scribing for, and in the presence of, Tawny Jeronimo M.D..    Electronically signed by: Irma Molina (Florinibfrancisco javier), 8/27/2019    I, Tawny Jeronimo M.D., personally performed the services described in this documentation, as scribed by Irma Molina in my presence, and it is both accurate and complete.

## 2019-09-24 ENCOUNTER — HOSPITAL ENCOUNTER (EMERGENCY)
Facility: MEDICAL CENTER | Age: 49
End: 2019-09-24
Attending: EMERGENCY MEDICINE
Payer: COMMERCIAL

## 2019-09-24 VITALS
BODY MASS INDEX: 40.92 KG/M2 | HEART RATE: 94 BPM | WEIGHT: 245.59 LBS | RESPIRATION RATE: 18 BRPM | SYSTOLIC BLOOD PRESSURE: 135 MMHG | TEMPERATURE: 97.7 F | HEIGHT: 65 IN | DIASTOLIC BLOOD PRESSURE: 69 MMHG | OXYGEN SATURATION: 97 %

## 2019-09-24 DIAGNOSIS — T78.40XA ALLERGIC REACTION, INITIAL ENCOUNTER: ICD-10-CM

## 2019-09-24 PROCEDURE — 99283 EMERGENCY DEPT VISIT LOW MDM: CPT

## 2019-09-25 NOTE — ED NOTES
Pt given written and oral dc instructions. Pt verbalized understanding of all instructions given. All questions answered. VSS. Pt given f/u instructions and educated on s/s of when to return to the ER. Pt amb independently upon time of dc in stable condition.

## 2019-09-25 NOTE — ED PROVIDER NOTES
ED Provider Note    ER PROVIDER NOTE        CHIEF COMPLAINT  Chief Complaint   Patient presents with   • Allergic Reaction     flu shot reaction, swelling and redness to injection site. denies sob/difficulty breathing. reports body aches since today       HPI  Gladis Tovar is a 48 y.o. female who presents to the emergency department complaining of pain and swelling at the site of her flu shot.  Patient had her influenza vaccination yesterday, states that through the days she has noticed some redness and swelling at the site.  It itches, slightly painful.  She denies any other rashes or lesions.  No shortness of breath or feelings of facial or throat swelling.  No nausea vomiting or chest pain.  Has had shot in the past without similar reaction although only once    REVIEW OF SYSTEMS  Pertinent positives include pain, swelling. Pertinent negatives include no fevers. See [facility] [place] for details. All other systems reviewed and are negative.    PAST MEDICAL HISTORY   has a past medical history of Asthma (05/17/2019), Depression, Snoring, and Urinary incontinence (05/17/2019).    SURGICAL HISTORY   has a past surgical history that includes tonsillectomy; breast reconstruction (1992); appendectomy (1994); cervical cerclage (1994/1996/1999); shoulder surgery (Bilateral, 2012); pelvic examination w anesth (5/23/2019); lap,diagnostic abdomen (5/23/2019); hysteroscopy,dx,sep proc (5/23/2019); salpingectomy (Bilateral, 5/23/2019); hysteroscopy with video operative (5/23/2019); hysteroscopy with myosure (5/23/2019); pelvic examination w anesth (N/A, 6/24/2019); lap,diagnostic abdomen (6/24/2019); vaginal hysterectomy scope total (6/24/2019); and cystoscopy (6/24/2019).    FAMILY HISTORY  Family History   Problem Relation Age of Onset   • Lung Disease Mother    • Arthritis Mother    • Diabetes Mother    • Hypertension Father    • Alcohol/Drug Brother    • Arthritis Maternal Grandmother    • Cancer Maternal Grandmother    • Diabetes  Maternal Grandmother        SOCIAL HISTORY  Social History     Socioeconomic History   • Marital status:      Spouse name: Not on file   • Number of children: Not on file   • Years of education: Not on file   • Highest education level: Not on file   Occupational History   • Not on file   Social Needs   • Financial resource strain: Not on file   • Food insecurity:     Worry: Not on file     Inability: Not on file   • Transportation needs:     Medical: Not on file     Non-medical: Not on file   Tobacco Use   • Smoking status: Never Smoker   • Smokeless tobacco: Never Used   Substance and Sexual Activity   • Alcohol use: Yes     Frequency: Monthly or less     Drinks per session: 1 or 2     Binge frequency: Never     Comment: 1 per month   • Drug use: No   • Sexual activity: Yes     Partners: Male     Birth control/protection: Surgical, [employer]   Lifestyle   • Physical activity:     Days per week: Not on file     Minutes per session: Not on file   • Stress: Not on file   Relationships   • Social connections:     Talks on phone: Not on file     Gets together: Not on file     Attends Buddhism service: Not on file     Active member of club or organization: Not on file     Attends meetings of clubs or organizations: Not on file     Relationship status: Not on file   • Intimate partner violence:     Fear of current or ex partner: Not on file     Emotionally abused: Not on file     Physically abused: Not on file     Forced sexual activity: Not on file   Other Topics Concern   • Not on file   Social History Narrative   • Not on file      Social History     Substance and Sexual Activity   Drug Use No       CURRENT MEDICATIONS  Home Medications    **Home medications have not yet been reviewed for this encounter**         ALLERGIES  Allergies   Allergen Reactions   • Codeine Unspecified     Heart stops  RXN=age 3       PHYSICAL EXAM  VITAL SIGNS: /88   Pulse (!) 103   Temp 36.3 °C (97.4 °F) (Temporal)   " Resp 18   Ht 1.651 m (5' 5\")   Wt 111.4 kg (245 lb 9.5 oz)   LMP  (LMP Unknown)   SpO2 95%   BMI 40.87 kg/m²   Pulse ox interpretation: I interpret this pulse ox as normal.    Constitutional: Alert in no apparent distress.  HENT: No signs of trauma, Bilateral external ears normal, Nose normal.   Eyes: Pupils are equal and reactive, Conjunctiva normal, Non-icteric.   Neck: Normal range of motion, No tenderness, Supple, No stridor.   Cardiovascular: Regular rate and rhythm, no murmurs.   Thorax & Lungs: Normal breath sounds, No respiratory distress, No wheezing, No chest tenderness.   Musculoskeletal: 4 cm area of erythema over the deltoid on the left, nontender, blanching, no fluctuance or induration good range of motion in all major joints. No tenderness to palpation or major deformities noted.   Neurologic: Alert , Normal motor function, Normal sensory function, No focal deficits noted.   Psychiatric: Affect normal, Judgment normal, Mood normal.     DIAGNOSTIC STUDIES / PROCEDURES      RADIOLOGY  No orders to display     The radiologist's interpretation of all radiological studies have been reviewed by me.    COURSE & MEDICAL DECISION MAKING  Nursing notes, VS, PMSFHx reviewed in chart.    9:28 PM Patient seen and examined at bedside.  Decision Making:  This is a 48 y.o. female presented with pain and swelling over the site of her influenza vaccination.  Likely post oxygenation inflammatory process, potential for small allergic component as well.  She states she cannot take Benadryl so advised Zyrtec.  Monitor for signs of infection.  She has no findings suggestive of a current infection or more systemic reaction/anaphylaxis     The patient will return for new or worsening symptoms and is stable at the time of discharge.    The patient is referred to a primary physician for blood pressure management, diabetic screening, and for all other preventative health concerns.      DISPOSITION:  Patient will be " discharged home in stable condition.    FOLLOW UP:  Tawny Jeronimo M.D.  [address] [id number] [phone]      As needed      OUTPATIENT MEDICATIONS:  New Prescriptions    No medications on file         FINAL IMPRESSION  1. Allergic reaction, initial encounter         The note accurately reflects work and decisions made by me.  Agustin Reddy  9/24/2019  9:58 PM

## 2019-09-25 NOTE — ED TRIAGE NOTES
"Pt bib friend for complaints of swelling redness and pain to left shoulder after receiving flu shot yesterday afternoon. Pt reports general body aches thru the day today.     Chief Complaint   Patient presents with   • Allergic Reaction     flu shot reaction, swelling and redness to injection site. denies sob/difficulty breathing. reports body aches since today       /88   Pulse (!) 103   Temp 36.3 °C (97.4 °F) (Temporal)   Resp 18   Ht 1.651 m (5' 5\")   Wt 111.4 kg (245 lb 9.5 oz)   LMP  (LMP Unknown)   SpO2 95%   BMI 40.87 kg/m²     "

## 2019-10-02 ENCOUNTER — APPOINTMENT (RX ONLY)
Dept: URBAN - METROPOLITAN AREA CLINIC 35 | Facility: CLINIC | Age: 49
Setting detail: DERMATOLOGY
End: 2019-10-02

## 2019-10-02 DIAGNOSIS — L82.1 OTHER SEBORRHEIC KERATOSIS: ICD-10-CM

## 2019-10-02 DIAGNOSIS — L81.4 OTHER MELANIN HYPERPIGMENTATION: ICD-10-CM

## 2019-10-02 DIAGNOSIS — I83.9 ASYMPTOMATIC VARICOSE VEINS OF LOWER EXTREMITIES: ICD-10-CM

## 2019-10-02 DIAGNOSIS — Q819 OTHER SPECIFIED ANOMALIES OF SKIN: ICD-10-CM

## 2019-10-02 DIAGNOSIS — Z71.89 OTHER SPECIFIED COUNSELING: ICD-10-CM

## 2019-10-02 DIAGNOSIS — D22 MELANOCYTIC NEVI: ICD-10-CM

## 2019-10-02 DIAGNOSIS — Q826 OTHER SPECIFIED ANOMALIES OF SKIN: ICD-10-CM

## 2019-10-02 DIAGNOSIS — L81.1 CHLOASMA: ICD-10-CM

## 2019-10-02 DIAGNOSIS — Q828 OTHER SPECIFIED ANOMALIES OF SKIN: ICD-10-CM

## 2019-10-02 PROBLEM — D22.5 MELANOCYTIC NEVI OF TRUNK: Status: ACTIVE | Noted: 2019-10-02

## 2019-10-02 PROBLEM — I83.92 ASYMPTOMATIC VARICOSE VEINS OF LEFT LOWER EXTREMITY: Status: ACTIVE | Noted: 2019-10-02

## 2019-10-02 PROBLEM — Q82.8 OTHER SPECIFIED CONGENITAL MALFORMATIONS OF SKIN: Status: ACTIVE | Noted: 2019-10-02

## 2019-10-02 PROCEDURE — ? TREATMENT REGIMEN

## 2019-10-02 PROCEDURE — ? PRESCRIPTION

## 2019-10-02 PROCEDURE — 99213 OFFICE O/P EST LOW 20 MIN: CPT

## 2019-10-02 PROCEDURE — ? COUNSELING

## 2019-10-02 PROCEDURE — ? ADDITIONAL NOTES

## 2019-10-02 RX ORDER — HYDROQUINONE 4 %
CREAM (GRAM) TOPICAL BID
Qty: 1 | Refills: 4 | Status: ERX | COMMUNITY
Start: 2019-10-02

## 2019-10-02 RX ADMIN — Medication ONE: at 00:00

## 2019-10-02 ASSESSMENT — LOCATION DETAILED DESCRIPTION DERM
LOCATION DETAILED: RIGHT DISTAL POSTERIOR UPPER ARM
LOCATION DETAILED: LEFT PROXIMAL RADIAL DORSAL FOREARM
LOCATION DETAILED: RIGHT DISTAL LATERAL POSTERIOR UPPER ARM
LOCATION DETAILED: SUPERIOR THORACIC SPINE
LOCATION DETAILED: LEFT MEDIAL PROXIMAL PRETIBIAL REGION
LOCATION DETAILED: LEFT DISTAL POSTERIOR UPPER ARM
LOCATION DETAILED: LEFT PROXIMAL DORSAL FOREARM
LOCATION DETAILED: RIGHT SUPERIOR MEDIAL UPPER BACK
LOCATION DETAILED: RIGHT DISTAL DORSAL FOREARM
LOCATION DETAILED: RIGHT PROXIMAL ULNAR DORSAL FOREARM
LOCATION DETAILED: MID TRAPEZIAL NECK

## 2019-10-02 ASSESSMENT — LOCATION SIMPLE DESCRIPTION DERM
LOCATION SIMPLE: RIGHT FOREARM
LOCATION SIMPLE: LEFT PRETIBIAL REGION
LOCATION SIMPLE: RIGHT UPPER BACK
LOCATION SIMPLE: RIGHT POSTERIOR UPPER ARM
LOCATION SIMPLE: TRAPEZIAL NECK
LOCATION SIMPLE: LEFT POSTERIOR UPPER ARM
LOCATION SIMPLE: UPPER BACK
LOCATION SIMPLE: LEFT FOREARM

## 2019-10-02 ASSESSMENT — LOCATION ZONE DERM
LOCATION ZONE: LEG
LOCATION ZONE: NECK
LOCATION ZONE: TRUNK
LOCATION ZONE: ARM

## 2019-10-02 NOTE — PROCEDURE: TREATMENT REGIMEN
Initiate Treatment: hydroquinone 4 % topical cream BID\\nDays Supply: 30\\nSig: Apply twice daily to affected areas on both arms x 2-3 months then stop for 1 month.  Repeat cycle prn.
Detail Level: Zone

## 2019-10-02 NOTE — PROCEDURE: ADDITIONAL NOTES
Additional Notes: Recommended to see a vascular specialist to have further evaluation of vein on left lower leg.
Detail Level: Detailed

## 2019-11-23 ENCOUNTER — HOSPITAL ENCOUNTER (OUTPATIENT)
Dept: LAB | Facility: MEDICAL CENTER | Age: 49
End: 2019-11-23
Attending: INTERNAL MEDICINE
Payer: COMMERCIAL

## 2019-11-23 DIAGNOSIS — F41.1 GENERALIZED ANXIETY DISORDER: ICD-10-CM

## 2019-11-23 DIAGNOSIS — E55.9 VITAMIN D INSUFFICIENCY: ICD-10-CM

## 2019-11-23 DIAGNOSIS — E78.00 HYPERCHOLESTEROLEMIA: ICD-10-CM

## 2019-11-23 DIAGNOSIS — F33.1 MODERATE EPISODE OF RECURRENT MAJOR DEPRESSIVE DISORDER (HCC): ICD-10-CM

## 2019-11-23 LAB
25(OH)D3 SERPL-MCNC: 23 NG/ML (ref 30–100)
ALBUMIN SERPL BCP-MCNC: 3.9 G/DL (ref 3.2–4.9)
ALBUMIN/GLOB SERPL: 1.6 G/DL
ALP SERPL-CCNC: 117 U/L (ref 30–99)
ALT SERPL-CCNC: 15 U/L (ref 2–50)
ANION GAP SERPL CALC-SCNC: 5 MMOL/L (ref 0–11.9)
AST SERPL-CCNC: 17 U/L (ref 12–45)
BILIRUB SERPL-MCNC: 0.4 MG/DL (ref 0.1–1.5)
BUN SERPL-MCNC: 13 MG/DL (ref 8–22)
CALCIUM SERPL-MCNC: 8.6 MG/DL (ref 8.5–10.5)
CHLORIDE SERPL-SCNC: 108 MMOL/L (ref 96–112)
CHOLEST SERPL-MCNC: 165 MG/DL (ref 100–199)
CO2 SERPL-SCNC: 26 MMOL/L (ref 20–33)
CREAT SERPL-MCNC: 0.77 MG/DL (ref 0.5–1.4)
FASTING STATUS PATIENT QL REPORTED: NORMAL
GLOBULIN SER CALC-MCNC: 2.5 G/DL (ref 1.9–3.5)
GLUCOSE SERPL-MCNC: 92 MG/DL (ref 65–99)
HDLC SERPL-MCNC: 39 MG/DL
LDLC SERPL CALC-MCNC: 100 MG/DL
POTASSIUM SERPL-SCNC: 4.2 MMOL/L (ref 3.6–5.5)
PROT SERPL-MCNC: 6.4 G/DL (ref 6–8.2)
SODIUM SERPL-SCNC: 139 MMOL/L (ref 135–145)
T4 FREE SERPL-MCNC: 0.67 NG/DL (ref 0.53–1.43)
TRIGL SERPL-MCNC: 128 MG/DL (ref 0–149)
TSH SERPL DL<=0.005 MIU/L-ACNC: 1.34 UIU/ML (ref 0.38–5.33)

## 2019-11-23 PROCEDURE — 36415 COLL VENOUS BLD VENIPUNCTURE: CPT

## 2019-11-23 PROCEDURE — 84443 ASSAY THYROID STIM HORMONE: CPT

## 2019-11-23 PROCEDURE — 80053 COMPREHEN METABOLIC PANEL: CPT

## 2019-11-23 PROCEDURE — 80061 LIPID PANEL: CPT

## 2019-11-23 PROCEDURE — 82306 VITAMIN D 25 HYDROXY: CPT

## 2019-11-23 PROCEDURE — 84439 ASSAY OF FREE THYROXINE: CPT

## 2019-11-27 ENCOUNTER — OFFICE VISIT (OUTPATIENT)
Dept: MEDICAL GROUP | Age: 49
End: 2019-11-27
Payer: COMMERCIAL

## 2019-11-27 VITALS
TEMPERATURE: 97.5 F | BODY MASS INDEX: 40.98 KG/M2 | WEIGHT: 246 LBS | HEIGHT: 65 IN | DIASTOLIC BLOOD PRESSURE: 72 MMHG | HEART RATE: 86 BPM | SYSTOLIC BLOOD PRESSURE: 100 MMHG | OXYGEN SATURATION: 96 %

## 2019-11-27 DIAGNOSIS — R74.8 ELEVATED ALKALINE PHOSPHATASE LEVEL: ICD-10-CM

## 2019-11-27 DIAGNOSIS — E55.9 VITAMIN D INSUFFICIENCY: ICD-10-CM

## 2019-11-27 DIAGNOSIS — F33.1 MODERATE EPISODE OF RECURRENT MAJOR DEPRESSIVE DISORDER (HCC): ICD-10-CM

## 2019-11-27 DIAGNOSIS — E78.00 HYPERCHOLESTEROLEMIA: ICD-10-CM

## 2019-11-27 PROCEDURE — 99214 OFFICE O/P EST MOD 30 MIN: CPT | Performed by: INTERNAL MEDICINE

## 2019-11-27 RX ORDER — ERGOCALCIFEROL 1.25 MG/1
50000 CAPSULE ORAL
Qty: 12 CAP | Refills: 3 | Status: SHIPPED | OUTPATIENT
Start: 2019-11-27 | End: 2020-09-28 | Stop reason: SDUPTHER

## 2019-11-27 RX ORDER — BUPROPION HYDROCHLORIDE 100 MG/1
100 TABLET ORAL 2 TIMES DAILY
Qty: 180 TAB | Refills: 3 | Status: SHIPPED | OUTPATIENT
Start: 2019-11-27 | End: 2021-01-06 | Stop reason: SDUPTHER

## 2019-11-27 RX ORDER — HYDROQUINONE 40 MG/G
CREAM TOPICAL
Refills: 4 | COMMUNITY
Start: 2019-10-07 | End: 2020-03-02

## 2019-11-27 ASSESSMENT — PAIN SCALES - GENERAL: PAINLEVEL: NO PAIN

## 2019-11-27 NOTE — PROGRESS NOTES
"Subjective:   Gladis Tovar is a 49 y.o. female here today for evaluation and management of:    Hypercholesterolemia  Chronic. Patient is currently attempting to control through diet and exercise alone, no pharmacological treatment. She denies any chest pain or claudication. Today we reviewed blood work from 11/23/19 which was stable with tot 165; tri 128; HDL 39; . The 10-year ASCVD risk score ([place] OSORIO Jr., et al., 2013) is: 1%.    I reviewed recent blood work with the patient in clinic today.   Ref. Range 8/24/2019 08:00 11/23/2019 08:44   Cholesterol,Tot Latest Ref Range: 100 - 199 mg/dL 172 165   Triglycerides Latest Ref Range: 0 - 149 mg/dL 152 (H) 128   HDL Latest Ref Range: >=40 mg/dL 44 39 (A)   LDL Latest Ref Range: <100 mg/dL 98 100 (H)     Vitamin D insufficiency  Chronic. Patient reports compliancy with OTC cholecalciferol 10,000 IU QD and denies any associated side effects. Today we reviewed blood work from 11/23/19 and vitamin D level continued to be low at 23. Today I have initiated her on ergocalciferol 50,000 IU once weekly.    I reviewed recent blood work with the patient in clinic today.   Ref. Range 8/24/2019 08:00 11/23/2019 08:44   25-Hydroxy   Vitamin D 25 Latest Ref Range: 30 - 100 ng/mL 24 (L) 23 (L)     Moderate episode of recurrent major depressive disorder (HCC)  Chronic. Patient reports compliancy with bupropion 100 mg BID and hydroxyzine HCl 25 mg TID PRN and denies any associated side effects. Patient states she's feeling \"a bit overwhelmed\" by family stressors however \"she's handling it well and doing pretty good\". Her parents are ill, her brother is in inpatient care for alcoholism, older son (25) was in [facility] and now inpatient for heroin, and her younger son (20) joined the . She is  however lives with her younger son. She regularly follows with Abdirahman, behavioral health counselor. She denies any suicidal ideation, plan, or self harm.     Elevated alkaline " phosphatase level  New onset. Blood work from 11/23/19 indicated elevated alkaline phosphatase level of 117. Normal AST (17) and ALT (15). Patient denies drinking alcohol and follows a pescatarian diet. She has had 4 surgeries over the past year. She denies history of shared needle use, blood transfusion, or hepatitis. She denies any  jaundice, white ester color stool, dark-colored urine urine, nausea, vomiting, abdominal pain, bone pain or weight loss. Today we discussed to continue to monitor with blood work and if remains elevated, plan for abdominal ultrasound at that time.     I reviewed recent blood work with the patient in clinic today.   Ref. Range 2/22/2019 07:25 6/28/2019 01:53 8/24/2019 08:00 11/23/2019 08:44   AST(SGOT) Latest Ref Range: 12 - 45 U/L 17 38 25 17   ALT(SGPT) Latest Ref Range: 2 - 50 U/L 13 35 27 15   Alkaline Phosphatase Latest Ref Range: 30 - 99 U/L 65 84 103 (H) 117 (H)     Current medicines (including changes today)  Current Outpatient Medications   Medication Sig Dispense Refill   • hydroquinone 4 % cream APPLY CREAM TOPICALLY TO AFFECTED AREAS TWICE DAILY ON BOTH ARMS FOR 2 3 MONTHS THEN STOP FOR 1 MONTH. REPEAT CYCLE AS NEEDED  4   • buPROPion (WELLBUTRIN) 100 MG Tab Take 1 Tab by mouth 2 times a day. 180 Tab 3   • ergocalciferol (DRISDOL) 07769 UNIT capsule Take 1 Cap by mouth every 7 days. 12 Cap 3   • ELMIRON 100 MG Cap Take 100 mg by mouth 3 times a day.  12   • hydrOXYzine HCl (ATARAX) 25 MG Tab Take 1 Tab by mouth 3 times a day as needed for Anxiety. 30 Tab 3   • Albuterol Sulfate 108 (90 Base) MCG/ACT AEROSOL POWDER, BREATH ACTIVATED Inhale 1-2 Puffs by mouth as needed.       No current facility-administered medications for this visit.      She  has a past medical history of Asthma (05/17/2019), Depression, Snoring, and Urinary incontinence (05/17/2019).    ROS   No chest pain, no shortness of breath, no abdominal pain       Objective:     /72 (BP Location: Left arm,  "Patient Position: Sitting, BP Cuff Size: Adult long)   Pulse 86   Temp 36.4 °C (97.5 °F) (Temporal)   Ht 1.651 m (5' 5\")   Wt 111.6 kg (246 lb)   SpO2 96%  Body mass index is 40.94 kg/m².   Physical Exam:  General: Alert, oriented and no acute distress.  Eye contact is good, speech goal directed, affect calm  HEENT: conjunctiva non-injected, sclera non-icteric.  Oral mucous membranes pink and moist with no lesions.  Pinna normal.   Lungs: Normal respiratory effort, clear to auscultation bilaterally with good excursion.  CV: regular rate and rhythm. No murmurs.   Abdomen: soft, non distended, nontender, Bowel sound normal.  Ext: no edema, color normal, vascularity normal, temperature normal  Musculoskeletal exam: moving all extremities freely      Assessment and Plan:   The following treatment plan was discussed:     1. Hypercholesterolemia  - Stable. Continue current regimen of diet and exercise control without pharmacological treatment. Advised on use of over the counter fish oil and vitamin E. I reviewed potential risks, benefits, and side effects of these medications with the patient in clinic today.  - Advised to eat low fat, low carbohydrate and high fiber diet as well as do cardio physical exercise regularly.   - Plan to continue to monitor with blood work, which I have ordered and will be reviewed by her new PCP at 3 month follow up.  - Comp Metabolic Panel; Future  - Lipid Profile; Future    2. Vitamin D insufficiency  - Not at goal yet. Patient advised to discontinue current regimen of OTC cholecalciferol 10,000 IU QD and to instead initiate ergocalciferol 50,000 IU once weekly. I reviewed potential risks, benefits, and side effects of this medication with the patient in clinic today.  - Plan to continue to monitor with blood work, which I have ordered and will be reviewed by her new PCP at 3 month follow up.  - ergocalciferol (DRISDOL) 44735 UNIT capsule; Take 1 Cap by mouth every 7 days.  Dispense: " 12 Cap; Refill: 3  - CBC WITH DIFFERENTIAL; Future  - VITAMIN D,25 HYDROXY; Future    3. Moderate episode of recurrent major depressive disorder (HCC)  - Well-controlled. She denies suicidal ideation, plan, or self harm. Continue current regimen, bupropion 100 mg BID and hydroxyzine HCl 25 mg TID PRN. I reviewed potential risks, benefits, and side effects of these medications with the patient in clinic today. Bupropion refilled today.  - Discussed with patient regarding the use and side effects of bupropion as well as black box warning of suicidality risk with medication. Patient verbally understands. Recommend to call 911 or go to ER if patient has suicidal ideation or plan.  - Advised to continue to follow with Abdirahman behavioral health counselor.  - Plan to continue to monitor with blood work, which I have ordered and will be reviewed by her new PCP at 3 month follow up.  - buPROPion (WELLBUTRIN) 100 MG Tab; Take 1 Tab by mouth 2 times a day.  Dispense: 180 Tab; Refill: 3  - CBC WITH DIFFERENTIAL; Future  - Comp Metabolic Panel; Future    4. Elevated alkaline phosphatase level  - Plan to continue to monitor. If alkaline phosphatase remains elevated in follow up blood work, plan for abdominal ultrasound at that time. Patient was advised on return precautions including jaundice, white ester color stool, dark urine, nausea, vomiting, abdominal pain, bone pain or weight loss due to concern for biliary tree or pancreatic duct obstruction.   - Patient is advised to avoid alcohol and avoid fatty foods.  - Plan to continue to monitor with blood work, which I have ordered and will be reviewed by her new PCP at 3 month follow up.  - CBC WITH DIFFERENTIAL; Future  - Comp Metabolic Panel; Future    5. Health Maintenance   - She reports colonoscopy completed at age 40 with 3 benign polyps removed, 10 year recall. Records requested. I ordered repeat colonoscopy on 12/26/18 however she has not yet completed. She is encouraged to  call and schedule colonoscopy with GI. She denies acute concerns including melena or hematochezia.  - Patient was advised to inquire about the shingles vaccine at their local pharmacy due to clinic shortage. I reviewed the potential risks, benefits, and side effects with the patient.      Follow up: Return in about 3 months (around 2/27/2020), or if symptoms worsen or fail to improve, for Hyperlipidemia, elevated alkaline phosphatase, Vitamin D insufficiency, Depression, Lab review. Patient will follow up with new PCP, Dr. Urvashi Gonzalez, in 3 months.    IAshley (Florinibfrancisco javier), am scribing for, and in the presence of, Tawny Jeronimo M.D.. Electronically signed by: Ashley Gabriel (Adrian), 11/27/2019. DEDE, Tawny Jeronimo M.D., personally performed the services described in this documentation, as scribed by Ashley Gabriel in my presence, and it is both accurate and complete.    Please note that this dictation was created using voice recognition software. I have made every reasonable attempt to correct obvious errors, but I expect that there may have unintended errors in text, spelling, punctuation, or grammar that I did not discover.

## 2019-12-05 ENCOUNTER — RX ONLY (OUTPATIENT)
Age: 49
Setting detail: RX ONLY
End: 2019-12-05

## 2020-02-24 ENCOUNTER — OFFICE VISIT (OUTPATIENT)
Dept: URGENT CARE | Facility: CLINIC | Age: 50
End: 2020-02-24
Payer: COMMERCIAL

## 2020-02-24 VITALS
OXYGEN SATURATION: 97 % | RESPIRATION RATE: 20 BRPM | DIASTOLIC BLOOD PRESSURE: 62 MMHG | TEMPERATURE: 97.6 F | SYSTOLIC BLOOD PRESSURE: 98 MMHG | BODY MASS INDEX: 40.98 KG/M2 | HEIGHT: 65 IN | HEART RATE: 88 BPM | WEIGHT: 246 LBS

## 2020-02-24 DIAGNOSIS — M54.50 ACUTE LEFT-SIDED LOW BACK PAIN WITHOUT SCIATICA: ICD-10-CM

## 2020-02-24 LAB
APPEARANCE UR: NORMAL
BILIRUB UR STRIP-MCNC: NORMAL MG/DL
COLOR UR AUTO: YELLOW
GLUCOSE UR STRIP.AUTO-MCNC: NORMAL MG/DL
KETONES UR STRIP.AUTO-MCNC: NORMAL MG/DL
LEUKOCYTE ESTERASE UR QL STRIP.AUTO: NORMAL
NITRITE UR QL STRIP.AUTO: NORMAL
PH UR STRIP.AUTO: 5.5 [PH] (ref 5–8)
PROT UR QL STRIP: NORMAL MG/DL
RBC UR QL AUTO: NORMAL
SP GR UR STRIP.AUTO: 1.03
UROBILINOGEN UR STRIP-MCNC: 0.2 MG/DL

## 2020-02-24 PROCEDURE — 99214 OFFICE O/P EST MOD 30 MIN: CPT | Performed by: FAMILY MEDICINE

## 2020-02-24 PROCEDURE — 81002 URINALYSIS NONAUTO W/O SCOPE: CPT | Performed by: FAMILY MEDICINE

## 2020-02-24 RX ORDER — CYCLOBENZAPRINE HCL 10 MG
10 TABLET ORAL
Qty: 15 TAB | Refills: 0 | Status: SHIPPED
Start: 2020-02-24 | End: 2020-03-02

## 2020-02-24 RX ORDER — MELOXICAM 15 MG/1
15 TABLET ORAL DAILY
Qty: 30 TAB | Refills: 0 | Status: SHIPPED
Start: 2020-02-24 | End: 2020-03-02

## 2020-02-24 RX ORDER — IBUPROFEN 800 MG/1
800 TABLET ORAL EVERY 8 HOURS PRN
COMMUNITY
End: 2020-02-24

## 2020-02-24 ASSESSMENT — ENCOUNTER SYMPTOMS
BOWEL INCONTINENCE: 0
BACK PAIN: 1
NUMBNESS: 0
LEG PAIN: 0
WEAKNESS: 0
TINGLING: 0

## 2020-02-24 ASSESSMENT — PAIN SCALES - GENERAL: PAINLEVEL: 8=MODERATE-SEVERE PAIN

## 2020-02-25 NOTE — PATIENT INSTRUCTIONS
Back Pain, Adult  Back pain is very common in adults. The cause of back pain is rarely dangerous and the pain often gets better over time. The cause of your back pain may not be known. Some common causes of back pain include:  · Strain of the muscles or ligaments supporting the spine.  · Wear and tear (degeneration) of the spinal disks.  · Arthritis.  · Direct injury to the back.  For many people, back pain may return. Since back pain is rarely dangerous, most people can learn to manage this condition on their own.  Follow these instructions at home:  Watch your back pain for any changes. The following actions may help to lessen any discomfort you are feeling:  · Remain active. It is stressful on your back to sit or  one place for long periods of time. Do not sit, drive, or  one place for more than 30 minutes at a time. Take short walks on even surfaces as soon as you are able. Try to increase the length of time you walk each day.  · Exercise regularly as directed by your health care provider. Exercise helps your back heal faster. It also helps avoid future injury by keeping your muscles strong and flexible.  · Do not stay in bed. Resting more than 1-2 days can delay your recovery.  · Pay attention to your body when you bend and lift. The most comfortable positions are those that put less stress on your recovering back. Always use proper lifting techniques, including:  ¨ Bending your knees.  ¨ Keeping the load close to your body.  ¨ Avoiding twisting.  · Find a comfortable position to sleep. Use a firm mattress and lie on your side with your knees slightly bent. If you lie on your back, put a pillow under your knees.  · Avoid feeling anxious or stressed. Stress increases muscle tension and can worsen back pain. It is important to recognize when you are anxious or stressed and learn ways to manage it, such as with exercise.  · Take medicines only as directed by your health care provider.  Over-the-counter medicines to reduce pain and inflammation are often the most helpful. Your health care provider may prescribe muscle relaxant drugs. These medicines help dull your pain so you can more quickly return to your normal activities and healthy exercise.  · Apply ice to the injured area:  ¨ Put ice in a plastic bag.  ¨ Place a towel between your skin and the bag.  ¨ Leave the ice on for 20 minutes, 2-3 times a day for the first 2-3 days. After that, ice and heat may be alternated to reduce pain and spasms.  · Maintain a healthy weight. Excess weight puts extra stress on your back and makes it difficult to maintain good posture.  Contact a health care provider if:  · You have pain that is not relieved with rest or medicine.  · You have increasing pain going down into the legs or buttocks.  · You have pain that does not improve in one week.  · You have night pain.  · You lose weight.  · You have a fever or chills.  Get help right away if:  · You develop new bowel or bladder control problems.  · You have unusual weakness or numbness in your arms or legs.  · You develop nausea or vomiting.  · You develop abdominal pain.  · You feel faint.  This information is not intended to replace advice given to you by your health care provider. Make sure you discuss any questions you have with your health care provider.  Document Released: 12/18/2006 Document Revised: 04/27/2017 Document Reviewed: 04/21/2015  [employer] Interactive Patient Education © 2017 [employer]

## 2020-02-25 NOTE — PROGRESS NOTES
"Subjective:   Gladis Tovar  is a 49 y.o. female who presents for Back Pain (lower left side of pain, feels that someone's punching her x 1 week)        Back Pain   This is a new problem. The current episode started in the past 7 days. The problem occurs constantly. The problem has been gradually worsening since onset. The pain is present in the lumbar spine. The quality of the pain is described as cramping and aching. The pain does not radiate. The pain is moderate. Pertinent negatives include no bladder incontinence, bowel incontinence, leg pain, numbness, tingling or weakness.     Review of Systems   Gastrointestinal: Negative for bowel incontinence.   Genitourinary: Negative for bladder incontinence.   Musculoskeletal: Positive for back pain.   Neurological: Negative for tingling, weakness and numbness.     Allergies   Allergen Reactions   • Codeine Unspecified     Heart stops  RXN=age 3      Objective:   BP (!) 98/62 (BP Location: Left arm, Patient Position: Sitting, BP Cuff Size: Adult long)   Pulse 88   Temp 36.4 °C (97.6 °F) (Temporal)   Resp 20   Ht 1.651 m (5' 5\")   Wt 111.6 kg (246 lb)   LMP  (LMP Unknown)   SpO2 97%   BMI 40.94 kg/m²   Physical Exam  Constitutional:       General: She is not in acute distress.     Appearance: She is well-developed.   HENT:      Head: Normocephalic and atraumatic.   Eyes:      Conjunctiva/sclera: Conjunctivae normal.      Pupils: Pupils are equal, round, and reactive to light.   Cardiovascular:      Rate and Rhythm: Normal rate and regular rhythm.      Heart sounds: Normal heart sounds. No murmur.   Pulmonary:      Effort: Pulmonary effort is normal. No respiratory distress.      Breath sounds: Normal breath sounds.   Abdominal:      General: Bowel sounds are normal. There is no distension.      Palpations: Abdomen is soft.      Tenderness: There is no abdominal tenderness.   Musculoskeletal:      Lumbar back: She exhibits decreased range of motion, tenderness and " spasm. She exhibits no bony tenderness and no deformity.   Skin:     General: Skin is warm and dry.   Neurological:      Mental Status: She is alert and oriented to person, place, and time.      Sensory: No sensory deficit.      Deep Tendon Reflexes: Reflexes are normal and symmetric.   Psychiatric:         Behavior: Behavior normal.           Assessment/Plan:   1. Acute left-sided low back pain without sciatica  - meloxicam (MOBIC) 15 MG tablet; Take 1 Tab by mouth every day.  Dispense: 30 Tab; Refill: 0  - cyclobenzaprine (FLEXERIL) 10 MG Tab; Take 1 Tab by mouth at bedtime as needed.  Dispense: 15 Tab; Refill: 0    Other orders  - Omega-3 Fatty Acids (OMEGA 3 500 PO); Take  by mouth.    Differential diagnosis, natural history, supportive care, and indications for immediate follow-up discussed.

## 2020-02-29 ENCOUNTER — HOSPITAL ENCOUNTER (OUTPATIENT)
Dept: LAB | Facility: MEDICAL CENTER | Age: 50
End: 2020-02-29
Attending: INTERNAL MEDICINE
Payer: COMMERCIAL

## 2020-02-29 DIAGNOSIS — F33.1 MODERATE EPISODE OF RECURRENT MAJOR DEPRESSIVE DISORDER (HCC): ICD-10-CM

## 2020-02-29 DIAGNOSIS — E55.9 VITAMIN D INSUFFICIENCY: ICD-10-CM

## 2020-02-29 DIAGNOSIS — R74.8 ELEVATED ALKALINE PHOSPHATASE LEVEL: ICD-10-CM

## 2020-02-29 DIAGNOSIS — E78.00 HYPERCHOLESTEROLEMIA: ICD-10-CM

## 2020-02-29 LAB
ALBUMIN SERPL BCP-MCNC: 4.3 G/DL (ref 3.2–4.9)
ALBUMIN/GLOB SERPL: 1.7 G/DL
ALP SERPL-CCNC: 113 U/L (ref 30–99)
ALT SERPL-CCNC: 21 U/L (ref 2–50)
ANION GAP SERPL CALC-SCNC: 8 MMOL/L (ref 0–11.9)
AST SERPL-CCNC: 21 U/L (ref 12–45)
BASOPHILS # BLD AUTO: 0.6 % (ref 0–1.8)
BASOPHILS # BLD: 0.04 K/UL (ref 0–0.12)
BILIRUB SERPL-MCNC: 0.6 MG/DL (ref 0.1–1.5)
BUN SERPL-MCNC: 13 MG/DL (ref 8–22)
CALCIUM SERPL-MCNC: 9 MG/DL (ref 8.5–10.5)
CHLORIDE SERPL-SCNC: 104 MMOL/L (ref 96–112)
CHOLEST SERPL-MCNC: 201 MG/DL (ref 100–199)
CO2 SERPL-SCNC: 27 MMOL/L (ref 20–33)
CREAT SERPL-MCNC: 0.68 MG/DL (ref 0.5–1.4)
EOSINOPHIL # BLD AUTO: 0 K/UL (ref 0–0.51)
EOSINOPHIL NFR BLD: 0 % (ref 0–6.9)
ERYTHROCYTE [DISTWIDTH] IN BLOOD BY AUTOMATED COUNT: 41.5 FL (ref 35.9–50)
GLOBULIN SER CALC-MCNC: 2.6 G/DL (ref 1.9–3.5)
GLUCOSE SERPL-MCNC: 103 MG/DL (ref 65–99)
HCT VFR BLD AUTO: 41.5 % (ref 37–47)
HDLC SERPL-MCNC: 44 MG/DL
HGB BLD-MCNC: 13.6 G/DL (ref 12–16)
IMM GRANULOCYTES # BLD AUTO: 0.03 K/UL (ref 0–0.11)
IMM GRANULOCYTES NFR BLD AUTO: 0.4 % (ref 0–0.9)
LDLC SERPL CALC-MCNC: 122 MG/DL
LYMPHOCYTES # BLD AUTO: 2.49 K/UL (ref 1–4.8)
LYMPHOCYTES NFR BLD: 36 % (ref 22–41)
MCH RBC QN AUTO: 27.4 PG (ref 27–33)
MCHC RBC AUTO-ENTMCNC: 32.8 G/DL (ref 33.6–35)
MCV RBC AUTO: 83.5 FL (ref 81.4–97.8)
MONOCYTES # BLD AUTO: 0.58 K/UL (ref 0–0.85)
MONOCYTES NFR BLD AUTO: 8.4 % (ref 0–13.4)
NEUTROPHILS # BLD AUTO: 3.78 K/UL (ref 2–7.15)
NEUTROPHILS NFR BLD: 54.6 % (ref 44–72)
NRBC # BLD AUTO: 0 K/UL
NRBC BLD-RTO: 0 /100 WBC
PLATELET # BLD AUTO: 211 K/UL (ref 164–446)
PMV BLD AUTO: 9.9 FL (ref 9–12.9)
POTASSIUM SERPL-SCNC: 3.9 MMOL/L (ref 3.6–5.5)
PROT SERPL-MCNC: 6.9 G/DL (ref 6–8.2)
RBC # BLD AUTO: 4.97 M/UL (ref 4.2–5.4)
SODIUM SERPL-SCNC: 139 MMOL/L (ref 135–145)
TRIGL SERPL-MCNC: 173 MG/DL (ref 0–149)
WBC # BLD AUTO: 6.9 K/UL (ref 4.8–10.8)

## 2020-02-29 PROCEDURE — 36415 COLL VENOUS BLD VENIPUNCTURE: CPT

## 2020-02-29 PROCEDURE — 82306 VITAMIN D 25 HYDROXY: CPT

## 2020-02-29 PROCEDURE — 85025 COMPLETE CBC W/AUTO DIFF WBC: CPT

## 2020-02-29 PROCEDURE — 80053 COMPREHEN METABOLIC PANEL: CPT

## 2020-02-29 PROCEDURE — 80061 LIPID PANEL: CPT

## 2020-03-02 ENCOUNTER — OFFICE VISIT (OUTPATIENT)
Dept: MEDICAL GROUP | Age: 50
End: 2020-03-02
Payer: COMMERCIAL

## 2020-03-02 VITALS
HEIGHT: 65 IN | SYSTOLIC BLOOD PRESSURE: 100 MMHG | TEMPERATURE: 97.9 F | BODY MASS INDEX: 41.15 KG/M2 | WEIGHT: 247 LBS | HEART RATE: 82 BPM | DIASTOLIC BLOOD PRESSURE: 68 MMHG | OXYGEN SATURATION: 96 %

## 2020-03-02 DIAGNOSIS — E55.9 VITAMIN D INSUFFICIENCY: ICD-10-CM

## 2020-03-02 DIAGNOSIS — N30.10 INTERSTITIAL CYSTITIS: ICD-10-CM

## 2020-03-02 DIAGNOSIS — F41.8 DEPRESSION WITH ANXIETY: ICD-10-CM

## 2020-03-02 DIAGNOSIS — R73.01 IMPAIRED FASTING BLOOD SUGAR: ICD-10-CM

## 2020-03-02 DIAGNOSIS — E78.00 HYPERCHOLESTEROLEMIA: ICD-10-CM

## 2020-03-02 DIAGNOSIS — Z76.89 ENCOUNTER TO ESTABLISH CARE WITH NEW DOCTOR: Primary | ICD-10-CM

## 2020-03-02 DIAGNOSIS — Z12.31 ENCOUNTER FOR SCREENING MAMMOGRAM FOR BREAST CANCER: ICD-10-CM

## 2020-03-02 DIAGNOSIS — E66.01 MORBID OBESITY WITH BMI OF 40.0-44.9, ADULT (HCC): ICD-10-CM

## 2020-03-02 DIAGNOSIS — Z12.11 SCREENING FOR COLORECTAL CANCER: ICD-10-CM

## 2020-03-02 DIAGNOSIS — J45.20 MILD INTERMITTENT ASTHMA WITHOUT COMPLICATION: ICD-10-CM

## 2020-03-02 DIAGNOSIS — Z12.12 SCREENING FOR COLORECTAL CANCER: ICD-10-CM

## 2020-03-02 PROBLEM — R74.8 ELEVATED ALKALINE PHOSPHATASE LEVEL: Status: RESOLVED | Noted: 2019-11-27 | Resolved: 2020-03-02

## 2020-03-02 PROBLEM — F41.1 GENERALIZED ANXIETY DISORDER: Status: RESOLVED | Noted: 2019-08-27 | Resolved: 2020-03-02

## 2020-03-02 PROBLEM — Z90.710 S/P HYSTERECTOMY: Status: ACTIVE | Noted: 2020-03-02

## 2020-03-02 LAB — 25(OH)D3 SERPL-MCNC: 39 NG/ML (ref 30–100)

## 2020-03-02 PROCEDURE — 99215 OFFICE O/P EST HI 40 MIN: CPT | Performed by: FAMILY MEDICINE

## 2020-03-02 RX ORDER — IBUPROFEN 800 MG/1
TABLET ORAL
COMMUNITY
Start: 2020-02-24 | End: 2020-09-28

## 2020-03-02 ASSESSMENT — FIBROSIS 4 INDEX: FIB4 SCORE: 1.11

## 2020-03-03 NOTE — PROGRESS NOTES
CC: establish care     HPI:     Gladis Tovar is a 49 y.o. female, new patient to the clinic and would like to establish care.     This is a former patient of Dr. Tawny Jeronimo.     Chronic History -- The patient has chronic history of depression with anxiety. She takes bupropion 100 MG Tab BID and hydroxyzine HCl 25 MG Tab as needed. She reports these medications work well for her and denies side effects. She says her depression stems from her loss of children, stressful job, and difficulty with parents.    Chronic History -- The patient has chronic history of Interstitial cystitis. She is taking Elmiron 100 mg TID on an empty stomach. She is asymptomatic and denies side effects. She is followed by [facility].     Chronic History -- The patient has Mild intermittent asthma without complication. She uses her albuterol inhaler as needed. She reports her symptoms are well controlled with albuterol and denies side effects. She says her symptoms are primarily exacerbated with cold weather.     Chronic History -- The patient has a chronic history of hypercholesterolemia. Currently managing through self efforts. She says her elevated lipid panel levels are secondary recent increased stress in her life. No acute complaints of dizziness, claudication or chest pain. I reviewed recent blood work with the patient in clinic today.      Ref. Range 2/29/2020 09:18   Cholesterol,Tot Latest Ref Range: 100 - 199 mg/dL 201 (H)   Triglycerides Latest Ref Range: 0 - 149 mg/dL 173 (H)   HDL Latest Ref Range: >=40 mg/dL 44   LDL Latest Ref Range: <100 mg/dL 122 (H)       Chronic History -- The patient has chronic history of elevated blood glucose. Currently managing through self efforts. She denies any vision changes, polyuria, polydipsia, polyphagia, numbness or tingling to their feet, lesions or opens wounds to their feet. I reviewed recent blood work with the patient in clinic today. She says she is working on weight loss, walking  10,000 steps a day. She is not interested in a weight loss program at this time.      Ref. Range 2020 09:18   Glucose Latest Ref Range: 65 - 99 mg/dL 103 (H)     Chronic History -- Patient has a chronic history of vitamin D insufficiency and is taking ergocalciferol 19478 Unit capsule on  evenings with avocado. She says she does not eat meat. She says her vitamin D levels have been checked every 3 months. I reviewed recent blood work with the patient in clinic today.      Ref. Range 2020 09:18   25-Hydroxy   Vitamin D 25 Latest Ref Range: 30 - 100 ng/mL 39     She has history of colonoscopy when she was 40, where they removed some polyps. She says she tried to get a follow up colonoscopy last year, but her insurance denied coverage, citing she had to wait 10 years form her last colonoscopy.      She is A0, one child passed during birth at 22 weeks, one passed away 30 minutes after birth at 24 weeks, 2 losses at first trimester. She says she drinks alcohol socially, once a month. She had a hysterectomy last year (biopsied and was found to have adenomyosis). Surgical history of tonsillectomy, appendectomy, breast reduction, cervical procedures, and bilateral shoulder surgery.     Current medicines (including changes today)  Current Outpatient Medications   Medication Sig Dispense Refill   • ibuprofen (MOTRIN) 800 MG Tab TAKE 1 TABLET BY MOUTH THREE TIMES DAILY WITH FOOD AND MILK FOR 30 DAYS     • Omega-3 Fatty Acids (OMEGA 3 500 PO) Take  by mouth.     • buPROPion (WELLBUTRIN) 100 MG Tab Take 1 Tab by mouth 2 times a day. 180 Tab 3   • ergocalciferol (DRISDOL) 87526 UNIT capsule Take 1 Cap by mouth every 7 days. 12 Cap 3   • ELMIRON 100 MG Cap Take 100 mg by mouth 2 times a day.  12   • hydrOXYzine HCl (ATARAX) 25 MG Tab Take 1 Tab by mouth 3 times a day as needed for Anxiety. 30 Tab 3   • Albuterol Sulfate 108 (90 Base) MCG/ACT AEROSOL POWDER, BREATH ACTIVATED Inhale 1-2 Puffs by mouth as needed.        No current facility-administered medications for this visit.      She  has a past medical history of Asthma (05/17/2019), Depression, Snoring, and Urinary incontinence (05/17/2019).  She  has a past surgical history that includes tonsillectomy; appendectomy (1994); cervical cerclage (1994/1996/1999); shoulder surgery (Bilateral, 2012); pr pelvic examination w anesth (5/23/2019); pr lap,diagnostic abdomen (5/23/2019); pr hysteroscopy,dx,sep proc (5/23/2019); hysteroscopy with video operative (5/23/2019); hysteroscopy with myosure (5/23/2019); pr pelvic examination w anesth (N/A, 6/24/2019); pr lap,diagnostic abdomen (6/24/2019); cystoscopy (6/24/2019); breast reconstruction (1992); salpingectomy (Bilateral, 5/23/2019); and vaginal hysterectomy scope total (6/24/2019).  Social History     Tobacco Use   • Smoking status: Never Smoker   • Smokeless tobacco: Never Used   Substance Use Topics   • Alcohol use: Yes     Frequency: Monthly or less     Drinks per session: 1 or 2     Binge frequency: Never     Comment: 1 per month   • Drug use: No     Social History     Social History Narrative   • Not on file     Family History   Problem Relation Age of Onset   • Lung Disease Mother    • Arthritis Mother    • Diabetes Mother    • Hypertension Father    • Alcohol/Drug Brother    • Arthritis Maternal Grandmother    • Cancer Maternal Grandmother    • Diabetes Maternal Grandmother      Family Status   Relation Name Status   • Mo  Alive   • Fa  Alive   • Bro  Alive   • MGMo  (Not Specified)       I personally reviewed patient's problem list, allergies, medications, family hx, social hx with patient and [facility].     REVIEW OF SYSTEMS:  CONSTITUTIONAL:  Denies night sweats, fatigue, malaise, lethargy, fever or chills.  RESPIRATORY:  Denies cough, wheeze, hemoptysis, or shortness of breath.  CARDIOVASCULAR:  Denies chest pains, palpitations, pedal edema  GASTROINTESTINAL:  Denies abdominal pain, nausea or vomiting, diarrhea,  "constipation, hematemesis, hematochezia, melena.  GENITOURINARY:  Denies urinary urgency, frequency, dysuria, or hematuria.  No obstructive symptoms.  Denies unusual discharge.    All other systems reviewed and are negative     Objective:     /68 (BP Location: Right arm, Patient Position: Sitting, BP Cuff Size: Large adult)   Pulse 82   Temp 36.6 °C (97.9 °F) (Temporal)   Ht 1.651 m (5' 5\")   Wt 112 kg (247 lb)   SpO2 96%  Body mass index is 41.1 kg/m².  Physical Exam:    Constitutional: Awake, alert, in no apparent distress.  -Morbidly obese  Skin: Warm, dry, good turgor, no rashes/jaundice in visible areas.  Eye: PERRL, intact EOM, conjunctiva clear, lids normal.  ENMT: TM and auditory canal wnl, nasal & oral mucosa wnl, lips without lesions, good dentition, oropharynx clear.  Neck: Trachea midline, no masses, no thyromegaly. No cervical or supraclavicular lymphadenopathy.  Respiratory: Unlabored respiratory effort, lungs clear to auscultation, no wheezes, no rales.  Cardiovascular: Normal S1, S2, no murmur, no rubs, no gallops, no pedal edema.  Abdomen: Soft, active bowel sounds, non-tender to palpation, no masses, no hepatosplenomegaly, no rebound or guarding.  Neuro: CN 2-12 grossly intact, no focal weakness/numbness.   Psych: Alert and oriented x3, affect and mood wnl, intact judgement and insight.       Assessment and Plan:   The following treatment plan was discussed    1. Interstitial cystitis  Chronic, takes Elmiron 100 mg BID managed for [facility], doing well currently.   - f/u with urology as directed.     2. Depression with anxiety  Chronic, controlled with Wellbutrin 100 mg BID, no s/e reported, will continue.    She does not work with . She takes Vistaril 25 mg TID PRN for anxiety flare.   - cont Wellbutrin 100 mg BID  - Appropriate counseling provided. Topics might include: regular exercise, well-balanced diet, multi-vitamin daily, weight loss, adequate sleep, meditation, stress " management, avoidance of excessive consumption of caffeine, alcohol, illicit drugs, tobacco.      3. Hypercholesterolemia  Chronic, low 10-year CVD risk, recommended dietary and lifestyle modification, brief dietary counseling provided, will continue to monitor.     - Lipid Profile; Future    4. Mild intermittent asthma without complication  Chronic, controlled with Albuterol PRN for SOB, no s/e reported, will continue.    She rarely has exacerbation.   - cont Albuterol PRN for SOB.     5. Morbid obesity with BMI of 40.0-44.9, adult (HCC)  - Patient identified as having weight management issue.  Appropriate orders and counseling given.     6. Impaired fasting blood sugar  - Pt was counseled on dietary modification, weight loss   - Recommended moderate intensity exercise at least 30 minutes per day x 5 days per week.  - Follow up in 6 months.   - HEMOGLOBIN A1C; Future    7. Vitamin D insufficiency  She is on 50k units of vitamin D weekly and has enough refill from Dr. Jeronimo for the next 12 months.   - cont 50k vitamin D weekly.   - VITAMIN D,25 HYDROXY; Future    8. Screening for colorectal cancer  - REFERRAL TO GI FOR COLONOSCOPY    9. Encounter for screening mammogram for breast cancer  - MA-SCREENING MAMMO BILAT W/CAD; Future    10. Encounter to establish care with new doctor  General counseling provided, topics might include: diet, exercise, vitamin supplement, mental health, sleep, stress management, pap, mammogram, colonoscopy and vaccine recommendations.        Patient was seen for 40 minutes face to face of which greater than 50% of appointment time was spent on counseling and coordination of care regarding the above      Ly COLBY Gonzalez M.D.    Records requested.  Followup: Return in about 6 months (around 9/2/2020) for Multiple issues.    Please note that this dictation was created using voice recognition software and/or scribes. I have made every reasonable attempt to correct obvious errors, but I expect that  there are errors of grammar and possibly content that I did not discover before finalizing the note.     IRaj (Scribe), am scribing for, and in the presence of, Urvashi Gonzalez M.D.    Electronically signed by: Raj Sweet (Florinibfrancisco javier), 3/2/2020    Urvashi AGUAYO M.D. personally performed the services described in this documentation, as scribed by Raj Sweet in my presence, and it is both accurate and complete.

## 2020-03-04 ENCOUNTER — HOSPITAL ENCOUNTER (OUTPATIENT)
Dept: RADIOLOGY | Facility: MEDICAL CENTER | Age: 50
End: 2020-03-04
Attending: FAMILY MEDICINE
Payer: COMMERCIAL

## 2020-03-04 DIAGNOSIS — Z12.31 VISIT FOR SCREENING MAMMOGRAM: ICD-10-CM

## 2020-03-04 PROCEDURE — 77067 SCR MAMMO BI INCL CAD: CPT | Mod: 50

## 2020-04-22 DIAGNOSIS — F41.1 GENERALIZED ANXIETY DISORDER: ICD-10-CM

## 2020-04-22 RX ORDER — HYDROXYZINE HYDROCHLORIDE 25 MG/1
TABLET, FILM COATED ORAL
Qty: 30 TAB | Refills: 0 | Status: SHIPPED | OUTPATIENT
Start: 2020-04-22 | End: 2021-02-11

## 2020-09-15 ENCOUNTER — HOSPITAL ENCOUNTER (OUTPATIENT)
Dept: LAB | Facility: MEDICAL CENTER | Age: 50
End: 2020-09-15
Attending: FAMILY MEDICINE
Payer: COMMERCIAL

## 2020-09-15 DIAGNOSIS — E78.00 HYPERCHOLESTEROLEMIA: ICD-10-CM

## 2020-09-15 DIAGNOSIS — R73.01 IMPAIRED FASTING BLOOD SUGAR: ICD-10-CM

## 2020-09-15 DIAGNOSIS — E55.9 VITAMIN D INSUFFICIENCY: ICD-10-CM

## 2020-09-15 LAB
25(OH)D3 SERPL-MCNC: 31 NG/ML (ref 30–100)
CHOLEST SERPL-MCNC: 161 MG/DL (ref 100–199)
EST. AVERAGE GLUCOSE BLD GHB EST-MCNC: 123 MG/DL
HBA1C MFR BLD: 5.9 % (ref 0–5.6)
HDLC SERPL-MCNC: 42 MG/DL
LDLC SERPL CALC-MCNC: 91 MG/DL
TRIGL SERPL-MCNC: 141 MG/DL (ref 0–149)

## 2020-09-15 PROCEDURE — 36415 COLL VENOUS BLD VENIPUNCTURE: CPT

## 2020-09-15 PROCEDURE — 83036 HEMOGLOBIN GLYCOSYLATED A1C: CPT

## 2020-09-15 PROCEDURE — 82306 VITAMIN D 25 HYDROXY: CPT

## 2020-09-15 PROCEDURE — 80061 LIPID PANEL: CPT

## 2020-09-24 SDOH — ECONOMIC STABILITY: INCOME INSECURITY: IN THE LAST 12 MONTHS, WAS THERE A TIME WHEN YOU WERE NOT ABLE TO PAY THE MORTGAGE OR RENT ON TIME?: NO

## 2020-09-24 SDOH — ECONOMIC STABILITY: HOUSING INSECURITY
IN THE LAST 12 MONTHS, WAS THERE A TIME WHEN YOU DID NOT HAVE A STEADY PLACE TO SLEEP OR SLEPT IN A SHELTER (INCLUDING NOW)?: NO

## 2020-09-24 SDOH — ECONOMIC STABILITY: TRANSPORTATION INSECURITY
IN THE PAST 12 MONTHS, HAS LACK OF RELIABLE TRANSPORTATION KEPT YOU FROM MEDICAL APPOINTMENTS, MEETINGS, WORK OR FROM GETTING THINGS NEEDED FOR DAILY LIVING?: NO

## 2020-09-24 SDOH — ECONOMIC STABILITY: HOUSING INSECURITY: IN THE LAST 12 MONTHS, HOW MANY PLACES HAVE YOU LIVED?: 2

## 2020-09-24 SDOH — HEALTH STABILITY: MENTAL HEALTH
STRESS IS WHEN SOMEONE FEELS TENSE, NERVOUS, ANXIOUS, OR CAN'T SLEEP AT NIGHT BECAUSE THEIR MIND IS TROUBLED. HOW STRESSED ARE YOU?: VERY MUCH

## 2020-09-24 SDOH — ECONOMIC STABILITY: TRANSPORTATION INSECURITY
IN THE PAST 12 MONTHS, HAS THE LACK OF TRANSPORTATION KEPT YOU FROM MEDICAL APPOINTMENTS OR FROM GETTING MEDICATIONS?: NO

## 2020-09-24 SDOH — HEALTH STABILITY: PHYSICAL HEALTH: ON AVERAGE, HOW MANY DAYS PER WEEK DO YOU ENGAGE IN MODERATE TO STRENUOUS EXERCISE (LIKE A BRISK WALK)?: 4 DAYS

## 2020-09-24 SDOH — HEALTH STABILITY: PHYSICAL HEALTH: ON AVERAGE, HOW MANY MINUTES DO YOU ENGAGE IN EXERCISE AT THIS LEVEL?: 30 MINUTES

## 2020-09-24 ASSESSMENT — SOCIAL DETERMINANTS OF HEALTH (SDOH)
HOW OFTEN DO YOU ATTENT MEETINGS OF THE CLUB OR ORGANIZATION YOU BELONG TO?: NEVER
WITHIN THE PAST 12 MONTHS, YOU WORRIED THAT YOUR FOOD WOULD RUN OUT BEFORE YOU GOT THE MONEY TO BUY MORE: NEVER TRUE
WITHIN THE PAST 12 MONTHS, THE FOOD YOU BOUGHT JUST DIDN'T LAST AND YOU DIDN'T HAVE MONEY TO GET MORE: NEVER TRUE
HOW OFTEN DO YOU HAVE A DRINK CONTAINING ALCOHOL: MONTHLY OR LESS
HOW OFTEN DO YOU HAVE SIX OR MORE DRINKS ON ONE OCCASION: NEVER
DO YOU BELONG TO ANY CLUBS OR ORGANIZATIONS SUCH AS CHURCH GROUPS UNIONS, FRATERNAL OR ATHLETIC GROUPS, OR SCHOOL GROUPS?: NO
HOW MANY DRINKS CONTAINING ALCOHOL DO YOU HAVE ON A TYPICAL DAY WHEN YOU ARE DRINKING: 1 OR 2
HOW OFTEN DO YOU GET TOGETHER WITH FRIENDS OR RELATIVES?: ONCE A WEEK
IN A TYPICAL WEEK, HOW MANY TIMES DO YOU TALK ON THE PHONE WITH FAMILY, FRIENDS, OR NEIGHBORS?: MORE THAN THREE TIMES A WEEK
HOW OFTEN DO YOU ATTEND CHURCH OR RELIGIOUS SERVICES?: MORE THAN 4 TIMES PER YEAR
HOW HARD IS IT FOR YOU TO PAY FOR THE VERY BASICS LIKE FOOD, HOUSING, MEDICAL CARE, AND HEATING?: NOT HARD AT ALL

## 2020-09-25 ENCOUNTER — NURSE TRIAGE (OUTPATIENT)
Dept: HEALTH INFORMATION MANAGEMENT | Facility: OTHER | Age: 50
End: 2020-09-25

## 2020-09-25 NOTE — TELEPHONE ENCOUNTER
Pt is very admittedly anxious and depressed. She has a long history of depression and was excluded from school because she had a headache and congestion. She was actively crying while on the phone and said the two things are related to her depression and overwhelming  Things going on. She has an appt on Monday to establish with a new PCP and a appt on Tuesday with her therapist which she needed clearance for. Pt understands that if she develops other symptoms not caused by crying and anxiety, she will call and cancel appts. Cleared for appointments. Her son is just home from basic training. I do not feel she has any intentions of harming herself or others.    1. Caller Name: Gladis Tovar                   Call Back Number: [phone] (home)   [facility] PCP or Specialty Provider: No          2.  In the last two weeks, has the patient had any new or worsening symptoms (not explained by alternative diagnosis)? No.    3.  Does patient have any comoribidities? None     4.  Has the patient traveled in the last 14 days OR had any known contact with someone who is suspected or confirmed to have COVID-19?  No.    5. Disposition: Cleared by RN Triage as potential is low for COVID-19; OK to keep/schedule appointment    Note routed to [facility] Provider: EFRAÍN only.

## 2020-09-26 ENCOUNTER — HOSPITAL ENCOUNTER (OUTPATIENT)
Dept: LAB | Facility: MEDICAL CENTER | Age: 50
End: 2020-09-26
Payer: COMMERCIAL

## 2020-09-26 LAB — COVID ORDER STATUS COVID19: NORMAL

## 2020-09-26 PROCEDURE — U0003 INFECTIOUS AGENT DETECTION BY NUCLEIC ACID (DNA OR RNA); SEVERE ACUTE RESPIRATORY SYNDROME CORONAVIRUS 2 (SARS-COV-2) (CORONAVIRUS DISEASE [COVID-19]), AMPLIFIED PROBE TECHNIQUE, MAKING USE OF HIGH THROUGHPUT TECHNOLOGIES AS DESCRIBED BY CMS-2020-01-R: HCPCS

## 2020-09-27 LAB
SARS-COV-2 RNA RESP QL NAA+PROBE: NOTDETECTED
SPECIMEN SOURCE: NORMAL

## 2020-09-28 ENCOUNTER — OFFICE VISIT (OUTPATIENT)
Dept: MEDICAL GROUP | Facility: LAB | Age: 50
End: 2020-09-28
Payer: COMMERCIAL

## 2020-09-28 VITALS
DIASTOLIC BLOOD PRESSURE: 70 MMHG | WEIGHT: 220 LBS | RESPIRATION RATE: 16 BRPM | TEMPERATURE: 96.4 F | BODY MASS INDEX: 36.65 KG/M2 | OXYGEN SATURATION: 97 % | HEART RATE: 77 BPM | HEIGHT: 65 IN | SYSTOLIC BLOOD PRESSURE: 110 MMHG

## 2020-09-28 DIAGNOSIS — F33.1 MODERATE EPISODE OF RECURRENT MAJOR DEPRESSIVE DISORDER (HCC): ICD-10-CM

## 2020-09-28 DIAGNOSIS — J45.20 MILD INTERMITTENT ASTHMA WITHOUT COMPLICATION: ICD-10-CM

## 2020-09-28 DIAGNOSIS — E55.9 VITAMIN D INSUFFICIENCY: ICD-10-CM

## 2020-09-28 PROBLEM — E78.00 HYPERCHOLESTEROLEMIA: Status: RESOLVED | Noted: 2018-12-26 | Resolved: 2020-09-28

## 2020-09-28 PROBLEM — E66.812 CLASS 2 OBESITY WITHOUT SERIOUS COMORBIDITY WITH BODY MASS INDEX (BMI) OF 36.0 TO 36.9 IN ADULT: Status: ACTIVE | Noted: 2018-03-12

## 2020-09-28 PROCEDURE — 99214 OFFICE O/P EST MOD 30 MIN: CPT | Performed by: FAMILY MEDICINE

## 2020-09-28 RX ORDER — FLUOXETINE HYDROCHLORIDE 20 MG/1
20 CAPSULE ORAL DAILY
Qty: 30 CAP | Refills: 3 | Status: SHIPPED | OUTPATIENT
Start: 2020-09-28 | End: 2020-12-03

## 2020-09-28 RX ORDER — ERGOCALCIFEROL 1.25 MG/1
50000 CAPSULE ORAL
Qty: 24 CAP | Refills: 2 | Status: SHIPPED | OUTPATIENT
Start: 2020-09-30 | End: 2021-07-15 | Stop reason: SDUPTHER

## 2020-09-28 ASSESSMENT — FIBROSIS 4 INDEX: FIB4 SCORE: 1.11

## 2020-09-28 ASSESSMENT — ANXIETY QUESTIONNAIRES
5. BEING SO RESTLESS THAT IT IS HARD TO SIT STILL: SEVERAL DAYS
GAD7 TOTAL SCORE: 16
1. FEELING NERVOUS, ANXIOUS, OR ON EDGE: MORE THAN HALF THE DAYS
6. BECOMING EASILY ANNOYED OR IRRITABLE: NEARLY EVERY DAY
7. FEELING AFRAID AS IF SOMETHING AWFUL MIGHT HAPPEN: NEARLY EVERY DAY
2. NOT BEING ABLE TO STOP OR CONTROL WORRYING: MORE THAN HALF THE DAYS
4. TROUBLE RELAXING: MORE THAN HALF THE DAYS
3. WORRYING TOO MUCH ABOUT DIFFERENT THINGS: NEARLY EVERY DAY

## 2020-09-28 ASSESSMENT — PATIENT HEALTH QUESTIONNAIRE - PHQ9
5. POOR APPETITE OR OVEREATING: 3 - NEARLY EVERY DAY
CLINICAL INTERPRETATION OF PHQ2 SCORE: 6
SUM OF ALL RESPONSES TO PHQ QUESTIONS 1-9: 23

## 2020-09-28 NOTE — LETTER
September 28, 2020         Patient: Gladis Tovar   YOB: 1970   Date of Visit: 9/28/2020           To Whom it May Concern:    Gladis Tovar was seen in my clinic on 9/28/2020. She may return to work on 10/12/2020.    If you have any questions or concerns, please don't hesitate to call.        Sincerely,           Estefanía Keenan M.D.  Electronically Signed

## 2020-09-28 NOTE — ASSESSMENT & PLAN NOTE
In 1992 lost a child at birth at 21 weeks from an incompetent cervix) and then again in 1996 she lost a daughter at 24 weeks.  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  One son is just back from [employer] basic training.  Other son has been in and out of [facility] with addiction.  She switched to wellbutrin a year ago for the weight gain.  Her depression has worsened over the last week.    He has been seeing the same counselor for 25 years    She had a hysterectomy and bilateral carpal tunnel surgery last year.    She has a strong teresita - she thinks about suicide but does know that she wouldn't hurt herself.      Patient denies SI/HI.  Depression Screen (PHQ-2/PHQ-9) 5/3/2018 2/27/2019 9/28/2020   PHQ-2 Total Score - 0 -   PHQ-2 Total Score 0 - 6   PHQ-9 Total Score - 0 -   PHQ-9 Total Score - - 23     SHARON-7 Questionnaire    Feeling nervous, anxious, or on edge: More than half the days  Not being able to sop or control worrying: More than half the days  Worrying too much about different things: Nearly every day  Trouble relaxing: More than half the days  Being so restless that it's hard to sit still: Several days  Becoming easily annoyed or irritable: Nearly every day  Feeling afraid as if something awful might happen: Nearly every day  Total: 16    Interpretation of SHARON 7 Total Score   Score Severity :  0-4 No Anxiety   5-9 Mild Anxiety  10-14 Moderate Anxiety  15-21 Severe Anxiety

## 2020-09-29 NOTE — ASSESSMENT & PLAN NOTE
Patient states that she only uses albuterol inhaler when she is exposed to cold air.  She is followed by Dr. Parmar, allergist.  She denies any recent flares

## 2020-09-29 NOTE — PROGRESS NOTES
Chief Complaint   Patient presents with   • Establish Care   • Lab Results         Gladis Tovar is a 49 y.o. female here to establish care and for evaluation and management of:        HPI:    Episode of recurrent major depressive disorder (HCC)  In 1992 lost a child at birth at 21 weeks from an incompetent cervix) and then again in 1996 she lost a daughter at 24 weeks.  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  One son is just back from [employer] basic training.  Other son has been in and out of [facility] with addiction.  She switched to wellbutrin a year ago for the weight gain.  Her depression has worsened over the last week.    He has been seeing the same counselor for 25 years    She had a hysterectomy and bilateral carpal tunnel surgery last year.    She has a strong teresita - she thinks about suicide but does know that she wouldn't hurt herself.      Patient denies SI/HI.  Depression Screen (PHQ-2/PHQ-9) 5/3/2018 2/27/2019 9/28/2020   PHQ-2 Total Score - 0 -   PHQ-2 Total Score 0 - 6   PHQ-9 Total Score - 0 -   PHQ-9 Total Score - - 23     SHARON-7 Questionnaire    Feeling nervous, anxious, or on edge: More than half the days  Not being able to sop or control worrying: More than half the days  Worrying too much about different things: Nearly every day  Trouble relaxing: More than half the days  Being so restless that it's hard to sit still: Several days  Becoming easily annoyed or irritable: Nearly every day  Feeling afraid as if something awful might happen: Nearly every day  Total: 16    Interpretation of SHARON 7 Total Score   Score Severity :  0-4 No Anxiety   5-9 Mild Anxiety  10-14 Moderate Anxiety  15-21 Severe Anxiety        Mild intermittent asthma without complication  Patient states that she only uses albuterol inhaler when she is exposed to cold air.  She is followed by Dr. Parmar, allergist.  She denies any recent flares      Allergies   Allergen Reactions   •  Codeine Unspecified     Heart stops  RXN=age 3       Current medicines (including changes today)  Current Outpatient Medications   Medication Sig Dispense Refill   • [START ON 9/30/2020] ergocalciferol (DRISDOL) 74642 UNIT capsule Take 1 Cap by mouth Every Sunday and Wednesday. 24 Cap 2   • FLUoxetine (PROZAC) 20 MG Cap Take 1 Cap by mouth every day. 30 Cap 3   • hydrOXYzine HCl (ATARAX) 25 MG Tab TAKE 1 TABLET BY MOUTH THREE TIMES DAILY AS NEEDED FOR ANXIETY 30 Tab 0   • buPROPion (WELLBUTRIN) 100 MG Tab Take 1 Tab by mouth 2 times a day. 180 Tab 3   • Albuterol Sulfate 108 (90 Base) MCG/ACT AEROSOL POWDER, BREATH ACTIVATED Inhale 1-2 Puffs by mouth as needed.       No current facility-administered medications for this visit.      She  has a past medical history of Asthma (05/17/2019), Depression, Snoring, and Urinary incontinence (05/17/2019).  She  has a past surgical history that includes tonsillectomy; appendectomy (1994); cervical cerclage (1994/1996/1999); shoulder surgery (Bilateral, 2012); pr pelvic examination w anesth (5/23/2019); pr lap,diagnostic abdomen (5/23/2019); pr hysteroscopy,dx,sep proc (5/23/2019); hysteroscopy with video operative (5/23/2019); hysteroscopy with myosure (5/23/2019); pr pelvic examination w anesth (N/A, 6/24/2019); pr lap,diagnostic abdomen (6/24/2019); cystoscopy (6/24/2019); breast reconstruction (1992); salpingectomy (Bilateral, 5/23/2019); and vaginal hysterectomy scope total (6/24/2019).  Social History     Tobacco Use   • Smoking status: Never Smoker   • Smokeless tobacco: Never Used   Substance Use Topics   • Alcohol use: Yes     Frequency: Monthly or less     Drinks per session: 1 or 2     Binge frequency: Never     Comment: 1 per month   • Drug use: No     Social History     Social History Narrative   • Not on file     Family History   Problem Relation Age of Onset   • Lung Disease Mother    • Arthritis Mother    • Diabetes Mother    • Hypertension Father    •  "Alcohol/Drug Brother    • Arthritis Maternal Grandmother    • Cancer Maternal Grandmother    • Diabetes Maternal Grandmother      Family Status   Relation Name Status   • Mo  Alive   • Fa  Alive   • Bro  Alive   • MGMo  (Not Specified)         ROS  No fever or chills.  No nausea or vomiting.  No chest pain or palpitations.  No cough or SOB.  No pain with urination or hematuria.  No black or bloody stools.  All other systems reviewed and are negative     Objective:     /70 (BP Location: Right arm, Patient Position: Sitting, BP Cuff Size: Adult)   Pulse 77   Temp (!) 35.8 °C (96.4 °F) (Temporal)   Resp 16   Ht 1.651 m (5' 5\")   Wt 99.8 kg (220 lb)   SpO2 97%  Body mass index is 36.61 kg/m².  Physical Exam:      Well developed, well nourished.  Alert, oriented in no acute distress.  Psych: Eye contact is good, speech goal directed, affect calm  Eyes: conjunctiva non-injected, sclera non-icteric.  Neck Supple.  No adenopathy or masses in the neck or supraclavicular regions. No thyromegaly  Lungs: clear to auscultation bilaterally with good excursion. No wheezes or rhonchi  CV: regular rate and rhythm. No murmur      Assessment and Plan:   The following treatment plan was discussed    1. Moderate episode of recurrent major depressive disorder (HCC)  Add fluoxetine 20 mg back in.  Continue Wellbutrin.  Increase exercise.  We will have her off work this week.  The following week's vacation.  Discussed meditation.  Continue work with counselor.  Follow-up in 4 weeks  - FLUoxetine (PROZAC) 20 MG Cap; Take 1 Cap by mouth every day.  Dispense: 30 Cap; Refill: 3    2. Vitamin D insufficiency  Increase vitamin D to 50,000 units twice a week  - ergocalciferol (DRISDOL) 61661 UNIT capsule; Take 1 Cap by mouth Every Sunday and Wednesday.  Dispense: 24 Cap; Refill: 2    3. Mild intermittent asthma without complication  Continue albuterol as needed      Records reviewed    Any change or worsening of signs or symptoms, " patient encouraged to follow-up or report to the emergency room for further evaluation. Patient understands and agrees.    Followup: Return in about 4 weeks (around 10/26/2020).

## 2020-10-22 ENCOUNTER — HOSPITAL ENCOUNTER (OUTPATIENT)
Facility: MEDICAL CENTER | Age: 50
End: 2020-10-22
Attending: PHYSICIAN ASSISTANT
Payer: COMMERCIAL

## 2020-10-22 PROCEDURE — 87086 URINE CULTURE/COLONY COUNT: CPT

## 2020-10-25 LAB
BACTERIA UR CULT: NORMAL
SIGNIFICANT IND 70042: NORMAL
SITE SITE: NORMAL
SOURCE SOURCE: NORMAL

## 2020-10-28 ENCOUNTER — OFFICE VISIT (OUTPATIENT)
Dept: MEDICAL GROUP | Facility: LAB | Age: 50
End: 2020-10-28
Payer: COMMERCIAL

## 2020-10-28 VITALS
SYSTOLIC BLOOD PRESSURE: 100 MMHG | OXYGEN SATURATION: 96 % | HEART RATE: 91 BPM | WEIGHT: 219 LBS | RESPIRATION RATE: 16 BRPM | TEMPERATURE: 96.6 F | BODY MASS INDEX: 36.49 KG/M2 | HEIGHT: 65 IN | DIASTOLIC BLOOD PRESSURE: 58 MMHG

## 2020-10-28 DIAGNOSIS — F33.1 MODERATE EPISODE OF RECURRENT MAJOR DEPRESSIVE DISORDER (HCC): ICD-10-CM

## 2020-10-28 PROCEDURE — 99213 OFFICE O/P EST LOW 20 MIN: CPT | Performed by: FAMILY MEDICINE

## 2020-10-28 RX ORDER — SULFAMETHOXAZOLE AND TRIMETHOPRIM 800; 160 MG/1; MG/1
TABLET ORAL
COMMUNITY
Start: 2020-10-22 | End: 2021-02-11

## 2020-10-28 RX ORDER — LORATADINE 10 MG/1
TABLET ORAL
COMMUNITY
Start: 2020-10-22 | End: 2021-02-11

## 2020-10-28 RX ORDER — FLUCONAZOLE 150 MG/1
TABLET ORAL
COMMUNITY
Start: 2020-10-22 | End: 2021-02-11

## 2020-10-28 RX ORDER — SOLIFENACIN SUCCINATE 5 MG/1
TABLET, FILM COATED ORAL
COMMUNITY
Start: 2020-10-22 | End: 2021-07-15

## 2020-10-28 RX ORDER — PHENAZOPYRIDINE HYDROCHLORIDE 200 MG/1
TABLET, FILM COATED ORAL
COMMUNITY
Start: 2020-10-22 | End: 2021-07-15

## 2020-10-28 ASSESSMENT — PATIENT HEALTH QUESTIONNAIRE - PHQ9
CLINICAL INTERPRETATION OF PHQ2 SCORE: 2
SUM OF ALL RESPONSES TO PHQ QUESTIONS 1-9: 10
5. POOR APPETITE OR OVEREATING: 2 - MORE THAN HALF THE DAYS

## 2020-10-28 ASSESSMENT — ANXIETY QUESTIONNAIRES
1. FEELING NERVOUS, ANXIOUS, OR ON EDGE: MORE THAN HALF THE DAYS
5. BEING SO RESTLESS THAT IT IS HARD TO SIT STILL: NOT AT ALL
3. WORRYING TOO MUCH ABOUT DIFFERENT THINGS: SEVERAL DAYS
6. BECOMING EASILY ANNOYED OR IRRITABLE: SEVERAL DAYS
7. FEELING AFRAID AS IF SOMETHING AWFUL MIGHT HAPPEN: SEVERAL DAYS
2. NOT BEING ABLE TO STOP OR CONTROL WORRYING: SEVERAL DAYS

## 2020-10-28 ASSESSMENT — FIBROSIS 4 INDEX: FIB4 SCORE: 1.11

## 2020-10-28 NOTE — ASSESSMENT & PLAN NOTE
Feeling much better. Currently taking wellbutrin 100 mg BID and fluoxetine 20 mg as prescribed. Seeing her counselor regularly  Denies side effects except for dry mouth at night and is tolerating well.  Mood is improved with current medication and therapy.    Patient denies SI/HI.  Depression Screen (PHQ-2/PHQ-9) 2/27/2019 9/28/2020 10/28/2020   PHQ-2 Total Score 0 - -   PHQ-2 Total Score - 6 2   PHQ-9 Total Score 0 - -   PHQ-9 Total Score - 23 10   SHARON-7 Questionnaire    Feeling nervous, anxious, or on edge: More than half the days  Not being able to sop or control worrying: Several days  Worrying too much about different things: Several days  Trouble relaxing:    Being so restless that it's hard to sit still: Not at all  Becoming easily annoyed or irritable: Several days  Feeling afraid as if something awful might happen: Several days  Total:      Interpretation of SHARON 7 Total Score   Score Severity :  0-4 No Anxiety   5-9 Mild Anxiety  10-14 Moderate Anxiety  15-21 Severe Anxiety

## 2020-10-28 NOTE — PROGRESS NOTES
Subjective:     Chief Complaint   Patient presents with   • Depression     followup       Gladis Tovar is a 49 y.o. female here today for evaluation and management of:    Episode of recurrent major depressive disorder (HCC)  Feeling much better. Currently taking wellbutrin 100 mg BID and fluoxetine 20 mg as prescribed. Seeing her counselor regularly  Denies side effects except for dry mouth at night and is tolerating well.  Mood is improved with current medication and therapy.    Patient denies SI/HI.  Depression Screen (PHQ-2/PHQ-9) 2/27/2019 9/28/2020 10/28/2020   PHQ-2 Total Score 0 - -   PHQ-2 Total Score - 6 2   PHQ-9 Total Score 0 - -   PHQ-9 Total Score - 23 10   SHARON-7 Questionnaire    Feeling nervous, anxious, or on edge: More than half the days  Not being able to sop or control worrying: Several days  Worrying too much about different things: Several days  Trouble relaxing:    Being so restless that it's hard to sit still: Not at all  Becoming easily annoyed or irritable: Several days  Feeling afraid as if something awful might happen: Several days  Total:      Interpretation of SHARON 7 Total Score   Score Severity :  0-4 No Anxiety   5-9 Mild Anxiety  10-14 Moderate Anxiety  15-21 Severe Anxiety               Allergies   Allergen Reactions   • Codeine Unspecified     Heart stops  RXN=age 3       Current medicines (including changes today)  Current Outpatient Medications   Medication Sig Dispense Refill   • fluconazole (DIFLUCAN) 150 MG tablet Diflucan 150 mg tablet   Take 1 tablet every 72 hours by oral route for 2 days.     • loratadine (CLARITIN) 10 MG Tab      • phenazopyridine (PYRIDIUM) 200 MG Tab Pyridium 200 mg tablet   Take 1 tablet 3 times a day by oral route as needed for 7 days.     • solifenacin (VESICARE) 5 MG tablet Vesicare 5 mg tablet   Take 1 tablet every day by oral route for 90 days.     • sulfamethoxazole-trimethoprim (BACTRIM DS) 800-160 MG tablet Bactrim  mg-160 mg tablet   Take 1  "tablet every 12 hours by oral route for 7 days.     • ergocalciferol (DRISDOL) 13467 UNIT capsule Take 1 Cap by mouth Every Sunday and Wednesday. 24 Cap 2   • FLUoxetine (PROZAC) 20 MG Cap Take 1 Cap by mouth every day. 30 Cap 3   • hydrOXYzine HCl (ATARAX) 25 MG Tab TAKE 1 TABLET BY MOUTH THREE TIMES DAILY AS NEEDED FOR ANXIETY 30 Tab 0   • buPROPion (WELLBUTRIN) 100 MG Tab Take 1 Tab by mouth 2 times a day. 180 Tab 3   • Albuterol Sulfate 108 (90 Base) MCG/ACT AEROSOL POWDER, BREATH ACTIVATED Inhale 1-2 Puffs by mouth as needed.       No current facility-administered medications for this visit.        She  has a past medical history of Asthma (05/17/2019), Depression, Snoring, and Urinary incontinence (05/17/2019).    Patient Active Problem List    Diagnosis Date Noted   • S/P hysterectomy 03/02/2020   • Impaired fasting blood sugar 03/02/2020   • Interstitial cystitis_[facility]  08/27/2019   • Episode of recurrent major depressive disorder (HCC) 12/26/2018   • Mild intermittent asthma without complication 12/26/2018   • Class 2 obesity without serious comorbidity with body mass index (BMI) of 36.0 to 36.9 in adult 03/12/2018       ROS   No fever or chills.  No nausea or vomiting.  No chest pain or palpitations.  No cough or SOB.  No pain with urination or hematuria.  No black or bloody stools.       Objective:     /58 (BP Location: Right arm, Patient Position: Sitting, BP Cuff Size: Adult)   Pulse 91   Temp 35.9 °C (96.6 °F) (Temporal)   Resp 16   Ht 1.651 m (5' 5\")   Wt 99.3 kg (219 lb)   SpO2 96%  Body mass index is 36.44 kg/m².   Physical Exam:  Well developed, well nourished.  Alert, oriented in no acute distress.  Eye contact is good, speech goal directed, affect calm  Eyes: conjunctiva non-injected, sclera non-icteric.  Neck Supple.  No adenopathy or masses in the neck or supraclavicular regions. No thyromegaly  Lungs: clear to auscultation bilaterally with good excursion. No wheezes or " rhonchi  CV: regular rate and rhythm. No murmur          Assessment and Plan:   The following treatment plan was discussed    1. Moderate episode of recurrent major depressive disorder (HCC)  Continue Wellbutrin 100 mg twice daily and fluoxetine 20 mg daily.  Discussed treating dry mouth with XyliMelts for symptomatic relief.  Continue work with therapist.  Discussed headspace and calm for mindfulness meditation.  Increase exercise.  Consider intermittent FMLA for work.    Any change or worsening of signs or symptoms, patient encouraged to follow-up or report to the emergency room for further evaluation. Patient understands and agrees.    Followup: Return in about 3 months (around 1/28/2021).

## 2020-12-03 DIAGNOSIS — F33.1 MODERATE EPISODE OF RECURRENT MAJOR DEPRESSIVE DISORDER (HCC): ICD-10-CM

## 2020-12-03 RX ORDER — FLUOXETINE HYDROCHLORIDE 20 MG/1
CAPSULE ORAL
Qty: 30 CAP | Refills: 3 | Status: SHIPPED | OUTPATIENT
Start: 2020-12-03 | End: 2021-03-05

## 2020-12-03 NOTE — TELEPHONE ENCOUNTER
Received request via: Pharmacy    Was the patient seen in the last year in this department? Yes    Does the patient have an active prescription (recently filled or refills available) for medication(s) requested? No     FLUOXETINE HCL 20 MG CAPSULE

## 2021-01-06 DIAGNOSIS — F33.1 MODERATE EPISODE OF RECURRENT MAJOR DEPRESSIVE DISORDER (HCC): ICD-10-CM

## 2021-01-06 NOTE — TELEPHONE ENCOUNTER
Received request via: Pharmacy/ NEW PHARMACY    Was the patient seen in the last year in this department? Yes  10/28/20  Does the patient have an active prescription (recently filled or refills available) for medication(s) requested? No

## 2021-01-07 RX ORDER — BUPROPION HYDROCHLORIDE 100 MG/1
100 TABLET ORAL 2 TIMES DAILY
Qty: 180 TAB | Refills: 2 | Status: SHIPPED | OUTPATIENT
Start: 2021-01-07 | End: 2021-09-23

## 2021-02-03 ENCOUNTER — APPOINTMENT (OUTPATIENT)
Dept: MEDICAL GROUP | Facility: LAB | Age: 51
End: 2021-02-03
Payer: COMMERCIAL

## 2021-02-11 ENCOUNTER — TELEMEDICINE (OUTPATIENT)
Dept: MEDICAL GROUP | Facility: LAB | Age: 51
End: 2021-02-11
Payer: COMMERCIAL

## 2021-02-11 VITALS
SYSTOLIC BLOOD PRESSURE: 108 MMHG | TEMPERATURE: 97.3 F | OXYGEN SATURATION: 99 % | DIASTOLIC BLOOD PRESSURE: 71 MMHG | HEIGHT: 65 IN | WEIGHT: 222 LBS | BODY MASS INDEX: 36.99 KG/M2

## 2021-02-11 DIAGNOSIS — F33.1 MODERATE EPISODE OF RECURRENT MAJOR DEPRESSIVE DISORDER (HCC): ICD-10-CM

## 2021-02-11 DIAGNOSIS — Z13.0 SCREENING FOR DEFICIENCY ANEMIA: ICD-10-CM

## 2021-02-11 DIAGNOSIS — F51.01 PRIMARY INSOMNIA: ICD-10-CM

## 2021-02-11 DIAGNOSIS — E55.9 VITAMIN D INSUFFICIENCY: ICD-10-CM

## 2021-02-11 DIAGNOSIS — E78.00 HYPERCHOLESTEROLEMIA: ICD-10-CM

## 2021-02-11 DIAGNOSIS — R06.83 SNORING: ICD-10-CM

## 2021-02-11 DIAGNOSIS — G47.19 EXCESSIVE DAYTIME SLEEPINESS: ICD-10-CM

## 2021-02-11 DIAGNOSIS — R73.01 IMPAIRED FASTING BLOOD SUGAR: ICD-10-CM

## 2021-02-11 PROCEDURE — 99214 OFFICE O/P EST MOD 30 MIN: CPT | Mod: 95,CR | Performed by: FAMILY MEDICINE

## 2021-02-11 RX ORDER — ZOLPIDEM TARTRATE 5 MG/1
TABLET ORAL
Qty: 2 TABLET | Refills: 0 | Status: SHIPPED | OUTPATIENT
Start: 2021-02-11 | End: 2021-02-12

## 2021-02-11 ASSESSMENT — ANXIETY QUESTIONNAIRES
5. BEING SO RESTLESS THAT IT IS HARD TO SIT STILL: NOT AT ALL
7. FEELING AFRAID AS IF SOMETHING AWFUL MIGHT HAPPEN: SEVERAL DAYS
6. BECOMING EASILY ANNOYED OR IRRITABLE: NOT AT ALL
1. FEELING NERVOUS, ANXIOUS, OR ON EDGE: SEVERAL DAYS
4. TROUBLE RELAXING: SEVERAL DAYS
2. NOT BEING ABLE TO STOP OR CONTROL WORRYING: SEVERAL DAYS
3. WORRYING TOO MUCH ABOUT DIFFERENT THINGS: SEVERAL DAYS
GAD7 TOTAL SCORE: 5

## 2021-02-11 ASSESSMENT — PATIENT HEALTH QUESTIONNAIRE - PHQ9
CLINICAL INTERPRETATION OF PHQ2 SCORE: 1
5. POOR APPETITE OR OVEREATING: 0 - NOT AT ALL
SUM OF ALL RESPONSES TO PHQ QUESTIONS 1-9: 6

## 2021-02-11 ASSESSMENT — FIBROSIS 4 INDEX: FIB4 SCORE: 1.13

## 2021-02-11 NOTE — ASSESSMENT & PLAN NOTE
Feeling much better and has made some lifestyle changes,  Her interstitial cystitis has been in better control.  Took FMLA for 4 weeks and switched to a different school.  At [facility] now.  Working with her counselor.   Currently taking Wellbutrin 100 mg BID, Prozac 20 mg daily as prescribed.   Denies side effects and is tolerating well.  Mood is improved with current medication and therapy.    Patient denies SI/HI.  Depression Screen (PHQ-2/PHQ-9) 9/28/2020 10/28/2020 2/11/2021   PHQ-2 Total Score - - -   PHQ-2 Total Score 6 2 1   PHQ-9 Total Score - - -   PHQ-9 Total Score 23 10 6       SHARON-7 Questionnaire    Feeling nervous, anxious, or on edge: Several days  Not being able to sop or control worrying: Several days  Worrying too much about different things: Several days  Trouble relaxing: Several days  Being so restless that it's hard to sit still: Not at all  Becoming easily annoyed or irritable: Not at all  Feeling afraid as if something awful might happen: Several days  Total: 5    Interpretation of SHARON 7 Total Score   Score Severity :  0-4 No Anxiety   5-9 Mild Anxiety  10-14 Moderate Anxiety  15-21 Severe Anxiety

## 2021-02-11 NOTE — PATIENT INSTRUCTIONS
Trazodone tablets  What is this medicine?  TRAZODONE (TRAZ oh done) is used to treat depression.  This medicine may be used for other purposes; ask your health care provider or pharmacist if you have questions.  COMMON BRAND NAME(S): Suzan  What should I tell my health care provider before I take this medicine?  They need to know if you have any of these conditions:  · attempted suicide or thinking about it  · bipolar disorder  · bleeding problems  · glaucoma  · heart disease, or previous heart attack  · irregular heart beat  · kidney or liver disease  · low levels of sodium in the blood  · an unusual or allergic reaction to trazodone, other medicines, foods, dyes or preservatives  · pregnant or trying to get pregnant  · breast-feeding  How should I use this medicine?  Take this medicine by mouth with a glass of water. Follow the directions on the prescription label. Take this medicine shortly after a meal or a light snack. Take your medicine at regular intervals. Do not take your medicine more often than directed. Do not stop taking this medicine suddenly except upon the advice of your doctor. Stopping this medicine too quickly may cause serious side effects or your condition may worsen.  A special MedGuide will be given to you by the pharmacist with each prescription and refill. Be sure to read this information carefully each time.  Talk to your pediatrician regarding the use of this medicine in children. Special care may be needed.  Overdosage: If you think you have taken too much of this medicine contact a poison control center or emergency room at once.  NOTE: This medicine is only for you. Do not share this medicine with others.  What if I miss a dose?  If you miss a dose, take it as soon as you can. If it is almost time for your next dose, take only that dose. Do not take double or extra doses.  What may interact with this medicine?  Do not take this medicine with any of the following  medications:  · certain medicines for fungal infections like fluconazole, itraconazole, ketoconazole, posaconazole, voriconazole  · cisapride  · dronedarone  · linezolid  · MAOIs like Carbex, Eldepryl, Marplan, Nardil, and Parnate  · mesoridazine  · methylene blue (injected into a vein)  · pimozide  · saquinavir  · thioridazine  This medicine may also interact with the following medications:  · alcohol  · antiviral medicines for HIV or AIDS  · aspirin and aspirin-like medicines  · barbiturates like phenobarbital  · certain medicines for blood pressure, heart disease, irregular heart beat  · certain medicines for depression, anxiety, or psychotic disturbances  · certain medicines for migraine headache like almotriptan, eletriptan, frovatriptan, naratriptan, rizatriptan, sumatriptan, zolmitriptan  · certain medicines for seizures like carbamazepine and phenytoin  · certain medicines for sleep  · certain medicines that treat or prevent blood clots like dalteparin, enoxaparin, warfarin  · digoxin  · fentanyl  · lithium  · NSAIDS, medicines for pain and inflammation, like ibuprofen or naproxen  · other medicines that prolong the QT interval (cause an abnormal heart rhythm) like dofetilide  · rasagiline  · supplements like Delicia's wort, kava kava, valerian  · tramadol  · tryptophan  This list may not describe all possible interactions. Give your health care provider a list of all the medicines, herbs, non-prescription drugs, or dietary supplements you use. Also tell them if you smoke, drink alcohol, or use illegal drugs. Some items may interact with your medicine.  What should I watch for while using this medicine?  Tell your doctor if your symptoms do not get better or if they get worse. Visit your doctor or health care professional for regular checks on your progress. Because it may take several weeks to see the full effects of this medicine, it is important to continue your treatment as prescribed by your  doctor.  Patients and their families should watch out for new or worsening thoughts of suicide or depression. Also watch out for sudden changes in feelings such as feeling anxious, agitated, panicky, irritable, hostile, aggressive, impulsive, severely restless, overly excited and hyperactive, or not being able to sleep. If this happens, especially at the beginning of treatment or after a change in dose, call your health care professional.  You may get drowsy or dizzy. Do not drive, use machinery, or do anything that needs mental alertness until you know how this medicine affects you. Do not stand or sit up quickly, especially if you are an older patient. This reduces the risk of dizzy or fainting spells. Alcohol may interfere with the effect of this medicine. Avoid alcoholic drinks.  This medicine may cause dry eyes and blurred vision. If you wear contact lenses you may feel some discomfort. Lubricating drops may help. See your eye doctor if the problem does not go away or is severe.  Your mouth may get dry. Chewing sugarless gum, sucking hard candy and drinking plenty of water may help. Contact your doctor if the problem does not go away or is severe.  What side effects may I notice from receiving this medicine?  Side effects that you should report to your doctor or health care professional as soon as possible:  · allergic reactions like skin rash, itching or hives, swelling of the face, lips, or tongue  · elevated mood, decreased need for sleep, racing thoughts, impulsive behavior  · confusion  · fast, irregular heartbeat  · feeling faint or lightheaded, falls  · feeling agitated, angry, or irritable  · loss of balance or coordination  · painful or prolonged erections  · restlessness, pacing, inability to keep still  · suicidal thoughts or other mood changes  · tremors  · trouble sleeping  · seizures  · unusual bleeding or bruising  Side effects that usually do not require medical attention (report to your doctor  or health care professional if they continue or are bothersome):  · change in sex drive or performance  · change in appetite or weight  · constipation  · headache  · muscle aches or pains  · nausea  This list may not describe all possible side effects. Call your doctor for medical advice about side effects. You may report side effects to FDA at 0-651-XAF-5965.  Where should I keep my medicine?  Keep out of the reach of children.  Store at room temperature between 15 and 30 degrees C (59 to 86 degrees F). Protect from light. Keep container tightly closed. Throw away any unused medicine after the expiration date.  NOTE: This sheet is a summary. It may not cover all possible information. If you have questions about this medicine, talk to your doctor, pharmacist, or health care provider.  © 2020 [employer] (2019-12-10 11:46:46)    Sleep Apnea  Sleep apnea is a condition in which breathing pauses or becomes shallow during sleep. Episodes of sleep apnea usually last 10 seconds or longer, and they may occur as many as 20 times an hour. Sleep apnea disrupts your sleep and keeps your body from getting the rest that it needs. This condition can increase your risk of certain health problems, including:  · Heart attack.  · Stroke.  · Obesity.  · Diabetes.  · Heart failure.  · Irregular heartbeat.  What are the causes?  There are three kinds of sleep apnea:  · Obstructive sleep apnea. This kind is caused by a blocked or collapsed airway.  · Central sleep apnea. This kind happens when the part of the brain that controls breathing does not send the correct signals to the muscles that control breathing.  · Mixed sleep apnea. This is a combination of obstructive and central sleep apnea.  The most common cause of this condition is a collapsed or blocked airway. An airway can collapse or become blocked if:  · Your throat muscles are abnormally relaxed.  · Your tongue and tonsils are larger than normal.  · You are  overweight.  · Your airway is smaller than normal.  What increases the risk?  You are more likely to develop this condition if you:  · Are overweight.  · Smoke.  · Have a smaller than normal airway.  · Are elderly.  · Are male.  · Drink alcohol.  · Take sedatives or tranquilizers.  · Have a family history of sleep apnea.  What are the signs or symptoms?  Symptoms of this condition include:  · Trouble staying asleep.  · Daytime sleepiness and tiredness.  · Irritability.  · Loud snoring.  · Morning headaches.  · Trouble concentrating.  · Forgetfulness.  · Decreased interest in sex.  · Unexplained sleepiness.  · Mood swings.  · Personality changes.  · Feelings of depression.  · Waking up often during the night to urinate.  · Dry mouth.  · Sore throat.  How is this diagnosed?  This condition may be diagnosed with:  · A medical history.  · A physical exam.  · A series of tests that are done while you are sleeping (sleep study). These tests are usually done in a sleep lab, but they may also be done at home.  How is this treated?  Treatment for this condition aims to restore normal breathing and to ease symptoms during sleep. It may involve managing health issues that can affect breathing, such as high blood pressure or obesity. Treatment may include:  · Sleeping on your side.  · Using a decongestant if you have nasal congestion.  · Avoiding the use of depressants, including alcohol, sedatives, and narcotics.  · Losing weight if you are overweight.  · Making changes to your diet.  · Quitting smoking.  · Using a device to open your airway while you sleep, such as:  ? An oral appliance. This is a custom-made mouthpiece that shifts your lower jaw forward.  ? A continuous positive airway pressure (CPAP) device. This device blows air through a mask when you breathe out (exhale).  ? A nasal expiratory positive airway pressure (EPAP) device. This device has valves that you put into each nostril.  ? A bi-level positive airway  pressure (BPAP) device. This device blows air through a mask when you breathe in (inhale) and breathe out (exhale).  · Having surgery if other treatments do not work. During surgery, excess tissue is removed to create a wider airway.  It is important to get treatment for sleep apnea. Without treatment, this condition can lead to:  · High blood pressure.  · Coronary artery disease.  · In men, an inability to achieve or maintain an erection (impotence).  · Reduced thinking abilities.  Follow these instructions at home:  Lifestyle  · Make any lifestyle changes that your health care provider recommends.  · Eat a healthy, well-balanced diet.  · Take steps to lose weight if you are overweight.  · Avoid using depressants, including alcohol, sedatives, and narcotics.  · Do not use any products that contain nicotine or tobacco, such as cigarettes, e-cigarettes, and chewing tobacco. If you need help quitting, ask your health care provider.  General instructions  · Take over-the-counter and prescription medicines only as told by your health care provider.  · If you were given a device to open your airway while you sleep, use it only as told by your health care provider.  · If you are having surgery, make sure to tell your health care provider you have sleep apnea. You may need to bring your device with you.  · Keep all follow-up visits as told by your health care provider. This is important.  Contact a health care provider if:  · The device that you received to open your airway during sleep is uncomfortable or does not seem to be working.  · Your symptoms do not improve.  · Your symptoms get worse.  Get help right away if:  · You develop:  ? Chest pain.  ? Shortness of breath.  ? Discomfort in your back, arms, or stomach.  · You have:  ? Trouble speaking.  ? Weakness on one side of your body.  ? Drooping in your face.  These symptoms may represent a serious problem that is an emergency. Do not wait to see if the symptoms will  go away. Get medical help right away. Call your local emergency services (911 in the [place] Do not drive yourself to the hospital.  Summary  · Sleep apnea is a condition in which breathing pauses or becomes shallow during sleep.  · The most common cause is a collapsed or blocked airway.  · The goal of treatment is to restore normal breathing and to ease symptoms during sleep.  This information is not intended to replace advice given to you by your health care provider. Make sure you discuss any questions you have with your health care provider.  Document Released: 12/08/2003 Document Revised: 10/04/2019 Document Reviewed: 08/13/2019  [employer] Patient Education © 2020 [employer]    SLEEP STUDY INSTRUCTIONS    1. Our main concern is to provide the best test and evaluation of your sleep and your cooperation in following the guidelines is very necessary.    2. We have no facilities for family members or guests at the sleep center. Special arrangements will be made for children requiring overnight sleep studies.    3. Unless otherwise instructed, AVOID alcoholic beverages on the day of your sleep study.    4. DO NOT drink coffee or caffeine-containing beverages after 12:00 noon on the day of your sleep study.    5. There is NO smoking at the sleep center.    6. Try to maintain a usual daytime schedule prior to the study (avoid unusual physical activity or meals).    7. DO NOT take a nap on the day of your study.    8. This is an outpatient procedure (test); therefore, nursing services, medications, and meals ARE NOT provided. If you take medications, bring them with you and take them on the schedule you do at home.    9. Please fill your sleep aid prescription (Ambien or Lunesta) and bring to your sleep study. Even patients who normally have no problem going to sleep often need a sleep aid in this different environment.    10. We ask that you wear conventional sleep attire (pajamas or sweats) for the sleep study. We  discourage patients from wearing only their underwear to bed. We recommend two-piece pajamas as the techs will need to apply sensors to your stomach.    11. Please shampoo your hair the day of the sleep study. Please DO NOT use any other hair or skin products before your arrival (e.g., mousse, gel, hair or body spray, perfume, body lotion etc.) NOTE: Women should not wear heavy makeup prior to arrival as some wires are taped to the face area.    12. The technician will be applying several small electrodes to the scalp, eye area, chin, chest, and legs, plus respiratory effort belts around the chest. Also, there will be a device placed directly under the nose. (THIS WILL NOT OBSTRUCT YOUR BREATHING.) This is a painless procedure and the skin is not broken.    13. The test is generally completed in six to eight hours; We are usually done between 6 - 7 a.m., unless you are scheduled for a nap study. You may need to come back another night for a second study to complete your treatment plan.    14. Patients who are scheduled for an MSLT (nap study) will stay at the sleep center for the day following their nighttime study. You will be notified if a nap study was ordered for you at the time the night study is scheduled. Generally, patients having a nap study will leave the sleep center by 4 p.m.    15. You will need to bring food for the following day if you are scheduled for a nap study. A refrigerator and microwave are available.    16. A bathroom is available for your use.    17. We are able to adjust the room temperature for your comfort. Please let the technologist know if you are uncomfortable during the study.    18. If you sleep better with a special pillow or stuffed animal, you may bring it along. Service animals are the only live animals permitted.    19. Cable T.V. is available.    20. You will be scheduled for a follow-up appointment three to five days after the sleep study to review your results.    21. A copy  of your sleep study is sent to the referring physician approximately two weeks after your study.    22. Any questions can be directed to our staff at [phone].    23. If CPAP therapy is applied, a home unit will be ordered for you through the [employer] company. You will be contacted to schedule delivery after insurance authorization.      Ambien Letter of Caution    You have been given this prescription for Ambien in case you have difficulty sleeping the night of your sleep study. Your doctor would like you to get the prescription filled and bring the medication with you to your sleep study appointment. Do not use it before your sleep study.     We make every effort to accomplish all we need in one sleep study. If you do not sleep enough during the first half of the night, you may need to have another sleep study to complete the treatment portion of the test. To help avoid the possibility of a second sleep study, we ask you to bring the Ambien with you. Do not take the Ambien before you arrive at the Sleep Center. The Ambien should only be taken once your sleep technologist has finished attaching the sensors. Your technologist may suggest you take the Ambien if you are unable to fall asleep within the first 30 minutes.     The prescription you have been given is for two (2) 5 mg doses. Do not take the Ambien if you have consumed any alcohol or have taken any narcotic medications on the day of your sleep test. Please tell your physician or your sleep technologist if you have any medication allergies or have had problems with sleep medications in the past.     Ambien does not affect the results of the sleep study. Your doctor has determined this medication is safe for you to take. You are not required to take the Ambien; however, we still recommend you fill the prescription and bring it with you even if you feel you won’t need it. Our technicians are unable to administer any sleep aides at your study  but will do their best to aid in your comfort.     If you take Ambien during your study, you may need to sleep later to make sure you are fully awake before leaving the Sleep Center. Ambien can impair alertness and motor coordination. We recommend you plan on staying as long as needed in the morning or arrange transportation. Please read all the information provided to you by your pharmacist about Ambien.             Thank you,                      [facility]

## 2021-02-18 NOTE — PROGRESS NOTES
Virtual Visit: Established Patient   This visit was conducted via Zoom using secure and encrypted videoconferencing technology. The patient was in a private location in the state of [place].    The patient's identity was confirmed and verbal consent was obtained for this virtual visit.    Subjective:   CC:   Chief Complaint   Patient presents with   • Depression     3 months    • Other     weight gain       Gladis Tovar is a 50 y.o. female presenting for evaluation and management of:    Episode of recurrent major depressive disorder (HCC)  Feeling much better and has made some lifestyle changes,  Her interstitial cystitis has been in better control.  Took FMLA for 4 weeks and switched to a different school.  At [employer] now.  Working with her counselor.   Currently taking Wellbutrin 100 mg BID, Prozac 20 mg daily as prescribed.   Denies side effects and is tolerating well.  Mood is improved with current medication and therapy.    Patient denies SI/HI.  Depression Screen (PHQ-2/PHQ-9) 9/28/2020 10/28/2020 2/11/2021   PHQ-2 Total Score - - -   PHQ-2 Total Score 6 2 1   PHQ-9 Total Score - - -   PHQ-9 Total Score 23 10 6       SHARON-7 Questionnaire    Feeling nervous, anxious, or on edge: Several days  Not being able to sop or control worrying: Several days  Worrying too much about different things: Several days  Trouble relaxing: Several days  Being so restless that it's hard to sit still: Not at all  Becoming easily annoyed or irritable: Not at all  Feeling afraid as if something awful might happen: Several days  Total: 5    Interpretation of SHARON 7 Total Score   Score Severity :  0-4 No Anxiety   5-9 Mild Anxiety  10-14 Moderate Anxiety  15-21 Severe Anxiety        STOPBANG - Sleep Apnea Screening      Most Recent Value   S - Have you been told that you SNORE?  Yes   T - Are you often TIRED during the day?  Yes   O - Do you know if you stop breathing or has anyone witnessed you stop breathing while you were asleep?  (OBSTRUCTION)  No   P - Do you have high blood PRESSURE or on medication to control high blood pressure?  No   B - Is your Body Mass Index greater than 35? (BMI)  Yes   A - Are you 50 years old or older? (AGE)  Yes   N - Are you a male with a NECK circumference greater than 17 inches, or a female with a neck circumference greater than 16 inches?  Yes   G - Are you male? (GENDER)  No   STOPBANG Total Score  5   GEORGE Risk  High Risk            ROS   Denies any recent fevers or chills. No nausea or vomiting. No chest pains or shortness of breath.     Allergies   Allergen Reactions   • Codeine Unspecified     Heart stops  RXN=age 3       Current medicines (including changes today)  Current Outpatient Medications   Medication Sig Dispense Refill   • LORATADINE PO loratadine     • buPROPion (WELLBUTRIN) 100 MG Tab Take 1 Tab by mouth 2 times a day. 180 Tab 2   • FLUoxetine (PROZAC) 20 MG Cap TAKE 1 CAPSULE BY MOUTH EVERY DAY 30 Cap 3   • phenazopyridine (PYRIDIUM) 200 MG Tab Pyridium 200 mg tablet   Take 1 tablet 3 times a day by oral route as needed for 7 days.     • solifenacin (VESICARE) 5 MG tablet Vesicare 5 mg tablet   Take 1 tablet every day by oral route for 90 days.     • ergocalciferol (DRISDOL) 52840 UNIT capsule Take 1 Cap by mouth Every Sunday and Wednesday. 24 Cap 2   • Albuterol Sulfate 108 (90 Base) MCG/ACT AEROSOL POWDER, BREATH ACTIVATED Inhale 1-2 Puffs by mouth as needed.       No current facility-administered medications for this visit.       Patient Active Problem List    Diagnosis Date Noted   • Primary insomnia 02/11/2021   • S/P hysterectomy 03/02/2020   • Impaired fasting blood sugar 03/02/2020   • [facility]is[facility] [place]  08/27/2019   • Episode of recurrent major depressive disorder (HCC) 12/26/2018   • Mild intermittent asthma without complication 12/26/2018   • Class 2 obesity without serious comorbidity with body mass index (BMI) of 36.0 to 36.9 in adult 03/12/2018       Family  "History   Problem Relation Age of Onset   • Lung Disease Mother    • Arthritis Mother    • Diabetes Mother    • Hypertension Father    • Alcohol/Drug Brother    • Arthritis Maternal Grandmother    • Cancer Maternal Grandmother    • Diabetes Maternal Grandmother        She  has a past medical history of Asthma (05/17/2019), Depression, Snoring, and Urinary incontinence (05/17/2019).  She  has a past surgical history that includes tonsillectomy; appendectomy (1994); cervical cerclage (1994/1996/1999); shoulder surgery (Bilateral, 2012); pr pelvic examination w anesth (5/23/2019); pr lap,diagnostic abdomen (5/23/2019); pr hysteroscopy,dx,sep proc (5/23/2019); hysteroscopy with video operative (5/23/2019); hysteroscopy with myosure (5/23/2019); pr pelvic examination w anesth (N/A, 6/24/2019); pr lap,diagnostic abdomen (6/24/2019); cystoscopy (6/24/2019); breast reconstruction (1992); salpingectomy (Bilateral, 5/23/2019); and vaginal hysterectomy scope total (6/24/2019).       Objective:   /71   Temp 36.3 °C (97.3 °F) (Temporal)   Ht 1.651 m (5' 5\")   Wt 101 kg (222 lb)   LMP  (LMP Unknown)   SpO2 99%   BMI 36.94 kg/m²     Physical Exam:  Constitutional: Alert, no distress, well-groomed.  Skin: No rashes in visible areas.  Eye: Round. Conjunctiva clear, lids normal. No icterus.   ENMT: Lips pink without lesions, good dentition, moist mucous membranes. Phonation normal.  Neck: No masses, no thyromegaly. Moves freely without pain.  Respiratory: Unlabored respiratory effort, no cough or audible wheeze  Psych: Alert and oriented x3, normal affect and mood.       Assessment and Plan:   The following treatment plan was discussed:     1. Moderate episode of recurrent major depressive disorder (HCC)  Improving.  Continue fluoxetine 20 mg and bupropion 100 mg twice a day.    2. Primary insomnia  Improving.  Consider trazodone    3. Excessive daytime sleepiness  Stopbang score of 5 that is considered high risk.  Refer " to sleep medicine for further evaluation treatment  - Polysomnography, 4 or More; Future  - REFERRAL TO PULMONARY AND SLEEP MEDICINE  - zolpidem (AMBIEN) 5 MG Tab; Take 1- 2 tab(s) by mouth at bedtime for sleep study (bring to sleep study)  Dispense: 2 tablet; Refill: 0    4. Snoring  Stopbang score of 5 that is considered high risk.  Refer to sleep medicine for further evaluation treatment  - Polysomnography, 4 or More; Future  - REFERRAL TO PULMONARY AND SLEEP MEDICINE  - zolpidem (AMBIEN) 5 MG Tab; Take 1- 2 tab(s) by mouth at bedtime for sleep study (bring to sleep study)  Dispense: 2 tablet; Refill: 0    5. Vitamin D insufficiency  Check vitamin D level and adjust supplementation as needed  - VITAMIN D,25 HYDROXY; Future    6. Hypercholesterolemia  Labs await results  - TSH; Future  - FREE THYROXINE; Future    7. Impaired fasting blood sugar  Check labs.  Dietary counseling done.  Encourage weight loss  - Comp Metabolic Panel; Future  - HEMOGLOBIN A1C; Future    8. Screening for deficiency anemia  This is a chronic medical condition that is currently stable    - CBC WITH DIFFERENTIAL; Future        Follow-up: Return in about 3 months (around 5/11/2021).

## 2021-02-22 ENCOUNTER — TELEPHONE (OUTPATIENT)
Dept: SLEEP MEDICINE | Facility: MEDICAL CENTER | Age: 51
End: 2021-02-22

## 2021-02-22 NOTE — TELEPHONE ENCOUNTER
[facility] SLEEP REFERRAL    REFERRAL/ SLEEP CONSULT NOTE BY MARKELL GREENE ON 2/11/2021 .    PT CAN HAVE TESTING DONE PRIOR TO NP APPT.    WILL CALL TO SCHEDULE.    AUTH REQUESTED

## 2021-03-05 DIAGNOSIS — F33.1 MODERATE EPISODE OF RECURRENT MAJOR DEPRESSIVE DISORDER (HCC): ICD-10-CM

## 2021-03-05 NOTE — TELEPHONE ENCOUNTER
Received request via: Pharmacy    Was the patient seen in the last year in this department? Yes  2/11/2021  Does the patient have an active prescription (recently filled or refills available) for medication(s) requested? No

## 2021-03-08 RX ORDER — FLUOXETINE HYDROCHLORIDE 20 MG/1
CAPSULE ORAL
Qty: 90 CAPSULE | Refills: 1 | Status: SHIPPED | OUTPATIENT
Start: 2021-03-08 | End: 2021-12-21 | Stop reason: SDUPTHER

## 2021-05-19 ENCOUNTER — HOSPITAL ENCOUNTER (OUTPATIENT)
Dept: RADIOLOGY | Facility: MEDICAL CENTER | Age: 51
End: 2021-05-19
Attending: FAMILY MEDICINE
Payer: COMMERCIAL

## 2021-05-19 DIAGNOSIS — Z12.31 VISIT FOR SCREENING MAMMOGRAM: ICD-10-CM

## 2021-05-19 PROCEDURE — 77063 BREAST TOMOSYNTHESIS BI: CPT

## 2021-07-06 ENCOUNTER — HOSPITAL ENCOUNTER (OUTPATIENT)
Dept: LAB | Facility: MEDICAL CENTER | Age: 51
End: 2021-07-06
Attending: FAMILY MEDICINE
Payer: COMMERCIAL

## 2021-07-06 DIAGNOSIS — E55.9 VITAMIN D INSUFFICIENCY: ICD-10-CM

## 2021-07-06 DIAGNOSIS — Z13.0 SCREENING FOR DEFICIENCY ANEMIA: ICD-10-CM

## 2021-07-06 DIAGNOSIS — E78.00 HYPERCHOLESTEROLEMIA: ICD-10-CM

## 2021-07-06 DIAGNOSIS — R73.01 IMPAIRED FASTING BLOOD SUGAR: ICD-10-CM

## 2021-07-06 LAB
25(OH)D3 SERPL-MCNC: 33 NG/ML (ref 30–100)
ALBUMIN SERPL BCP-MCNC: 4.3 G/DL (ref 3.2–4.9)
ALBUMIN/GLOB SERPL: 1.5 G/DL
ALP SERPL-CCNC: 124 U/L (ref 30–99)
ALT SERPL-CCNC: 13 U/L (ref 2–50)
ANION GAP SERPL CALC-SCNC: 8 MMOL/L (ref 7–16)
AST SERPL-CCNC: 22 U/L (ref 12–45)
BASOPHILS # BLD AUTO: 0.8 % (ref 0–1.8)
BASOPHILS # BLD: 0.05 K/UL (ref 0–0.12)
BILIRUB SERPL-MCNC: 0.4 MG/DL (ref 0.1–1.5)
BUN SERPL-MCNC: 13 MG/DL (ref 8–22)
CALCIUM SERPL-MCNC: 9.2 MG/DL (ref 8.5–10.5)
CHLORIDE SERPL-SCNC: 107 MMOL/L (ref 96–112)
CO2 SERPL-SCNC: 27 MMOL/L (ref 20–33)
CREAT SERPL-MCNC: 0.84 MG/DL (ref 0.5–1.4)
EOSINOPHIL # BLD AUTO: 0.14 K/UL (ref 0–0.51)
EOSINOPHIL NFR BLD: 2.1 % (ref 0–6.9)
ERYTHROCYTE [DISTWIDTH] IN BLOOD BY AUTOMATED COUNT: 43.8 FL (ref 35.9–50)
EST. AVERAGE GLUCOSE BLD GHB EST-MCNC: 120 MG/DL
FASTING STATUS PATIENT QL REPORTED: NORMAL
GLOBULIN SER CALC-MCNC: 2.9 G/DL (ref 1.9–3.5)
GLUCOSE SERPL-MCNC: 94 MG/DL (ref 65–99)
HBA1C MFR BLD: 5.8 % (ref 4–5.6)
HCT VFR BLD AUTO: 45.1 % (ref 37–47)
HGB BLD-MCNC: 14.3 G/DL (ref 12–16)
IMM GRANULOCYTES # BLD AUTO: 0.03 K/UL (ref 0–0.11)
IMM GRANULOCYTES NFR BLD AUTO: 0.5 % (ref 0–0.9)
LYMPHOCYTES # BLD AUTO: 2.54 K/UL (ref 1–4.8)
LYMPHOCYTES NFR BLD: 38.8 % (ref 22–41)
MCH RBC QN AUTO: 26.9 PG (ref 27–33)
MCHC RBC AUTO-ENTMCNC: 31.7 G/DL (ref 33.6–35)
MCV RBC AUTO: 84.9 FL (ref 81.4–97.8)
MONOCYTES # BLD AUTO: 0.5 K/UL (ref 0–0.85)
MONOCYTES NFR BLD AUTO: 7.6 % (ref 0–13.4)
NEUTROPHILS # BLD AUTO: 3.28 K/UL (ref 2–7.15)
NEUTROPHILS NFR BLD: 50.2 % (ref 44–72)
NRBC # BLD AUTO: 0 K/UL
NRBC BLD-RTO: 0 /100 WBC
PLATELET # BLD AUTO: 234 K/UL (ref 164–446)
PMV BLD AUTO: 10 FL (ref 9–12.9)
POTASSIUM SERPL-SCNC: 4.6 MMOL/L (ref 3.6–5.5)
PROT SERPL-MCNC: 7.2 G/DL (ref 6–8.2)
RBC # BLD AUTO: 5.31 M/UL (ref 4.2–5.4)
SODIUM SERPL-SCNC: 142 MMOL/L (ref 135–145)
T4 FREE SERPL-MCNC: 0.82 NG/DL (ref 0.93–1.7)
TSH SERPL DL<=0.005 MIU/L-ACNC: 1.34 UIU/ML (ref 0.38–5.33)
WBC # BLD AUTO: 6.5 K/UL (ref 4.8–10.8)

## 2021-07-06 PROCEDURE — 83036 HEMOGLOBIN GLYCOSYLATED A1C: CPT

## 2021-07-06 PROCEDURE — 84443 ASSAY THYROID STIM HORMONE: CPT

## 2021-07-06 PROCEDURE — 84439 ASSAY OF FREE THYROXINE: CPT

## 2021-07-06 PROCEDURE — 80053 COMPREHEN METABOLIC PANEL: CPT

## 2021-07-06 PROCEDURE — 85025 COMPLETE CBC W/AUTO DIFF WBC: CPT

## 2021-07-06 PROCEDURE — 36415 COLL VENOUS BLD VENIPUNCTURE: CPT

## 2021-07-06 PROCEDURE — 82306 VITAMIN D 25 HYDROXY: CPT

## 2021-07-12 ENCOUNTER — APPOINTMENT (OUTPATIENT)
Dept: MEDICAL GROUP | Facility: LAB | Age: 51
End: 2021-07-12
Payer: COMMERCIAL

## 2021-07-15 ENCOUNTER — TELEMEDICINE (OUTPATIENT)
Dept: MEDICAL GROUP | Facility: LAB | Age: 51
End: 2021-07-15
Payer: COMMERCIAL

## 2021-07-15 VITALS — WEIGHT: 230 LBS | HEIGHT: 65 IN | BODY MASS INDEX: 38.32 KG/M2

## 2021-07-15 DIAGNOSIS — E55.9 VITAMIN D INSUFFICIENCY: ICD-10-CM

## 2021-07-15 DIAGNOSIS — R73.03 PREDIABETES: ICD-10-CM

## 2021-07-15 DIAGNOSIS — R74.8 ELEVATED ALKALINE PHOSPHATASE LEVEL: ICD-10-CM

## 2021-07-15 DIAGNOSIS — E66.9 CLASS 2 OBESITY WITHOUT SERIOUS COMORBIDITY WITH BODY MASS INDEX (BMI) OF 38.0 TO 38.9 IN ADULT, UNSPECIFIED OBESITY TYPE: ICD-10-CM

## 2021-07-15 DIAGNOSIS — E03.9 ACQUIRED HYPOTHYROIDISM: ICD-10-CM

## 2021-07-15 DIAGNOSIS — E78.5 DYSLIPIDEMIA: ICD-10-CM

## 2021-07-15 PROCEDURE — 99214 OFFICE O/P EST MOD 30 MIN: CPT | Mod: 95 | Performed by: FAMILY MEDICINE

## 2021-07-15 RX ORDER — MIRABEGRON 25 MG/1
TABLET, FILM COATED, EXTENDED RELEASE ORAL
COMMUNITY
Start: 2021-02-01

## 2021-07-15 RX ORDER — LEVOTHYROXINE SODIUM 0.05 MG/1
50 TABLET ORAL
Qty: 90 TABLET | Refills: 1 | Status: SHIPPED | OUTPATIENT
Start: 2021-07-15 | End: 2021-12-20

## 2021-07-15 RX ORDER — ERGOCALCIFEROL 1.25 MG/1
50000 CAPSULE ORAL
Qty: 24 CAPSULE | Refills: 3 | Status: SHIPPED | OUTPATIENT
Start: 2021-07-18 | End: 2021-09-07

## 2021-07-15 ASSESSMENT — FIBROSIS 4 INDEX: FIB4 SCORE: 1.36

## 2021-07-20 NOTE — ASSESSMENT & PLAN NOTE
Patient reports that she has been eating too much sugar and has gained 8 pounds since her last visit.  She denies any polyuria or polyphagia.

## 2021-07-20 NOTE — PROGRESS NOTES
Virtual Visit: Established Patient   This visit was conducted via Zoom using secure and encrypted videoconferencing technology. The patient was in a private location in the state of [place].    The patient's identity was confirmed and verbal consent was obtained for this virtual visit.    Subjective:   CC:   Chief Complaint   Patient presents with   • Lab Results       Gladis Tovar is a 50 y.o. female presenting for evaluation and management of:    Prediabetes  Patient reports that she has been eating too much sugar and has gained 8 pounds since her last visit.  She denies any polyuria or polyphagia.    Elevated alkaline phosphatase level  Patient denies any jaundice or dark urine.    Acquired hypothyroidism  Results for GLADIS TOVAR (MRN [id number]) as of 7/20/2021 11:57   Ref. Range 7/6/2021 09:58   TSH Latest Ref Range: 0.380 - 5.330 uIU/mL 1.340   Free T-4 Latest Ref Range: 0.93 - 1.70 ng/dL 0.82 (L)   This is a new problem.        RUSTAM   Denies any recent fevers or chills. No nausea or vomiting. No chest pains or shortness of breath.     Allergies   Allergen Reactions   • Codeine Unspecified     Heart stops  RXN=age 3       Current medicines (including changes today)  Current Outpatient Medications   Medication Sig Dispense Refill   • Mirabegron ER (MYRBETRIQ) 25 MG TABLET SR 24 HR Myrbetriq 25 mg tablet,extended release   TAKE 1 TABLET BY MOUTH EVERY DAY     • ergocalciferol (DRISDOL) 95010 UNIT capsule Take 1 capsule by mouth Every Sunday and Wednesday. 24 capsule 3   • levothyroxine (SYNTHROID) 50 MCG Tab Take 1 tablet by mouth every morning on an empty stomach. 90 tablet 1   • FLUoxetine (PROZAC) 20 MG Cap TAKE 1 CAPSULE BY MOUTH EVERY DAY 90 capsule 1   • LORATADINE PO loratadine     • buPROPion (WELLBUTRIN) 100 MG Tab Take 1 Tab by mouth 2 times a day. 180 Tab 2   • Albuterol Sulfate 108 (90 Base) MCG/ACT AEROSOL POWDER, BREATH ACTIVATED Inhale 1-2 Puffs by mouth as needed.       No current facility-administered  "medications for this visit.       Patient Active Problem List    Diagnosis Date Noted   • Acquired hypothyroidism 07/15/2021   • Dyslipidemia 07/15/2021   • Primary insomnia 02/11/2021   • S/P hysterectomy 03/02/2020   • Prediabetes 03/02/2020   • Elevated alkaline phosphatase level 11/27/2019   • Inter[facility]is[facility]  08/27/2019   • Episode of recurrent major depressive disorder (HCC) 12/26/2018   • Mild intermittent asthma without complication 12/26/2018   • Class 2 obesity without serious comorbidity with body mass index (BMI) of 38.0 to 38.9 in adult 03/12/2018       Family History   Problem Relation Age of Onset   • Lung Disease Mother    • Arthritis Mother    • Diabetes Mother    • Hypertension Father    • Alcohol/Drug Brother    • Arthritis Maternal Grandmother    • Cancer Maternal Grandmother    • Diabetes Maternal Grandmother        She  has a past medical history of Asthma (05/17/2019), Depression, Snoring, and Urinary incontinence (05/17/2019).  She  has a past surgical history that includes tonsillectomy; appendectomy (1994); cervical cerclage (1994/1996/1999); shoulder surgery (Bilateral, 2012); pr pelvic examination w anesth (5/23/2019); pr lap,diagnostic abdomen (5/23/2019); pr hysteroscopy,dx,sep proc (5/23/2019); hysteroscopy with video operative (5/23/2019); hysteroscopy with myosure (5/23/2019); pr pelvic examination w anesth (N/A, 6/24/2019); pr lap,diagnostic abdomen (6/24/2019); cystoscopy (6/24/2019); breast reconstruction (1992); salpingectomy (Bilateral, 5/23/2019); and vaginal hysterectomy scope total (6/24/2019).       Objective:   Ht 1.651 m (5' 5\")   Wt 104 kg (230 lb)   LMP  (LMP Unknown)   BMI 38.27 kg/m²     Physical Exam:  Constitutional: Alert, no distress, well-groomed.  Skin: No rashes in visible areas.  Eye: Round. Conjunctiva clear, lids normal. No icterus.   ENMT: Lips pink without lesions, good dentition, moist mucous membranes. Phonation normal.  Neck: " No masses, no thyromegaly. Moves freely without pain.  Respiratory: Unlabored respiratory effort, no cough or audible wheeze  Psych: Alert and oriented x3, normal affect and mood.       Assessment and Plan:   The following treatment plan was discussed:   1. Vitamin D insufficiency  Start vitamin D twice a week.  Patient is currently on once a week and that is not maintaining her vitamin D levels.  - ergocalciferol (DRISDOL) 07937 UNIT capsule; Take 1 capsule by mouth Every Sunday and Wednesday.  Dispense: 24 capsule; Refill: 3    2. Acquired hypothyroidism  This is a new problem.  Start levothyroxine 50 mcg daily.  Recheck labs in 3 months.  Patient education done  - levothyroxine (SYNTHROID) 50 MCG Tab; Take 1 tablet by mouth every morning on an empty stomach.  Dispense: 90 tablet; Refill: 1  - TSH; Future  - FREE THYROXINE; Future    3. Prediabetes  Dietary counseling done.  Encourage weight loss.  Check hemoglobin A1c in 3 months  - HEMOGLOBIN A1C; Future    4. Class 2 obesity without serious comorbidity with body mass index (BMI) of 38.0 to 38.9 in adult, unspecified obesity type  Counseling done.  Encourage weight loss.  Recheck in 3 months    5. Dyslipidemia  Mediterranean eating plan information discussed.  Recheck lipid panel with her next blood draw.  - Lipid Profile; Future    6. Elevated alkaline phosphatase level  Discussed that weight loss can help with this.  Recheck hepatic function in 3 months.  - HEPATIC FUNCTION PANEL; Future      Follow-up: Return in about 3 months (around 10/15/2021).

## 2021-07-20 NOTE — ASSESSMENT & PLAN NOTE
Results for SHILPI STRONG (MRN [id number]) as of 7/20/2021 11:57   Ref. Range 7/6/2021 09:58   TSH Latest Ref Range: 0.380 - 5.330 uIU/mL 1.340   Free T-4 Latest Ref Range: 0.93 - 1.70 ng/dL 0.82 (L)   This is a new problem.

## 2021-07-30 ENCOUNTER — HOSPITAL ENCOUNTER (OUTPATIENT)
Facility: MEDICAL CENTER | Age: 51
End: 2021-07-30
Attending: PHYSICIAN ASSISTANT
Payer: COMMERCIAL

## 2021-07-30 ENCOUNTER — OFFICE VISIT (OUTPATIENT)
Dept: URGENT CARE | Facility: PHYSICIAN GROUP | Age: 51
End: 2021-07-30
Payer: COMMERCIAL

## 2021-07-30 VITALS
BODY MASS INDEX: 34.99 KG/M2 | HEIGHT: 65 IN | OXYGEN SATURATION: 97 % | TEMPERATURE: 98.6 F | RESPIRATION RATE: 12 BRPM | WEIGHT: 210 LBS | DIASTOLIC BLOOD PRESSURE: 70 MMHG | SYSTOLIC BLOOD PRESSURE: 140 MMHG | HEART RATE: 78 BPM

## 2021-07-30 DIAGNOSIS — R05.9 COUGH: ICD-10-CM

## 2021-07-30 DIAGNOSIS — J45.20 MILD INTERMITTENT ASTHMA WITHOUT COMPLICATION: ICD-10-CM

## 2021-07-30 DIAGNOSIS — R53.83 FATIGUE, UNSPECIFIED TYPE: ICD-10-CM

## 2021-07-30 LAB — COVID ORDER STATUS COVID19: NORMAL

## 2021-07-30 PROCEDURE — U0003 INFECTIOUS AGENT DETECTION BY NUCLEIC ACID (DNA OR RNA); SEVERE ACUTE RESPIRATORY SYNDROME CORONAVIRUS 2 (SARS-COV-2) (CORONAVIRUS DISEASE [COVID-19]), AMPLIFIED PROBE TECHNIQUE, MAKING USE OF HIGH THROUGHPUT TECHNOLOGIES AS DESCRIBED BY CMS-2020-01-R: HCPCS

## 2021-07-30 PROCEDURE — 99214 OFFICE O/P EST MOD 30 MIN: CPT | Performed by: PHYSICIAN ASSISTANT

## 2021-07-30 PROCEDURE — U0005 INFEC AGEN DETEC AMPLI PROBE: HCPCS

## 2021-07-30 RX ORDER — DEXTROMETHORPHAN HYDROBROMIDE AND PROMETHAZINE HYDROCHLORIDE 15; 6.25 MG/5ML; MG/5ML
5 SYRUP ORAL 4 TIMES DAILY PRN
Qty: 100 ML | Refills: 0 | Status: SHIPPED | OUTPATIENT
Start: 2021-07-30 | End: 2021-08-04

## 2021-07-30 RX ORDER — BENZONATATE 100 MG/1
200 CAPSULE ORAL 3 TIMES DAILY PRN
Qty: 60 CAPSULE | Refills: 0 | Status: SHIPPED | OUTPATIENT
Start: 2021-07-30 | End: 2021-09-28

## 2021-07-30 RX ORDER — ALBUTEROL SULFATE 90 UG/1
2 AEROSOL, METERED RESPIRATORY (INHALATION) EVERY 6 HOURS PRN
Qty: 8.5 G | Refills: 0 | Status: SHIPPED | OUTPATIENT
Start: 2021-07-30

## 2021-07-30 ASSESSMENT — ENCOUNTER SYMPTOMS
SORE THROAT: 1
COUGH: 1
SPUTUM PRODUCTION: 0
MYALGIAS: 1
WHEEZING: 1
CHILLS: 1
SHORTNESS OF BREATH: 0

## 2021-07-30 ASSESSMENT — FIBROSIS 4 INDEX: FIB4 SCORE: 1.36

## 2021-07-30 NOTE — PROGRESS NOTES
"Subjective:   Gladis Tovar  is a 50 y.o. female who presents for Illness (coughing started yesturday, fatique, throat hurting from coughing)    Patient identity confirmed using two patient identifiers of last name and date of birth.        HPI     Ms. Tovar is a very pleasant 50-year-old female with previous history of cold-induced asthma who reports onset yesterday of feeling very ill, fatigued, sweaty and clammy with mild sore throat and significant cough.  Cough is nonproductive.  Last evening, she noted a whistle type sound when breathing out.  The cough was disruptive of sleep.  Patient denies any fever but does admit to chills.  Also reports generalized body aches.  She has had no significant nasal congestion.  Denies nausea, vomiting or diarrhea.  Denies loss of taste or loss of smell.  Patient has had no known exposure to COVID-19.  She is fully Covid vaccinated.  Review of Systems   Constitutional: Positive for chills and malaise/fatigue.   HENT: Positive for sore throat. Negative for congestion.    Respiratory: Positive for cough and wheezing. Negative for sputum production and shortness of breath.    Cardiovascular: Negative for chest pain.   Musculoskeletal: Positive for myalgias.   All other systems reviewed and are negative.    Allergies   Allergen Reactions   • Codeine Unspecified     Heart stops  RXN=age 3     Reviewed past medical, surgical , social and family history.  Reviewed prescription and over-the-counter medications with patient and electronic health record today.     Objective:   /70 (BP Location: Left arm, Patient Position: Sitting, BP Cuff Size: Adult)   Pulse 78   Temp 37 °C (98.6 °F) (Oral)   Resp 12   Ht 1.651 m (5' 5\")   Wt 95.3 kg (210 lb)   LMP  (LMP Unknown)   SpO2 97%   BMI 34.95 kg/m²   Physical Exam  Vitals reviewed.   Constitutional:       General: She is not in acute distress.     Appearance: She is well-developed. She is not ill-appearing or toxic-appearing.   HENT: "      Head: Normocephalic and atraumatic.      Right Ear: Tympanic membrane, ear canal and external ear normal.      Left Ear: Tympanic membrane, ear canal and external ear normal.      Nose: Nose normal.      Mouth/Throat:      Lips: Pink. No lesions.      Mouth: Mucous membranes are moist.      Pharynx: Oropharynx is clear. Uvula midline. No oropharyngeal exudate.   Eyes:      General: Lids are normal.      Extraocular Movements: Extraocular movements intact.      Conjunctiva/sclera: Conjunctivae normal.      Pupils: Pupils are equal, round, and reactive to light.   Cardiovascular:      Rate and Rhythm: Normal rate and regular rhythm.      Heart sounds: Normal heart sounds. No murmur heard.   No friction rub. No gallop.    Pulmonary:      Effort: Pulmonary effort is normal. Prolonged expiration present. No respiratory distress.      Breath sounds: Normal breath sounds.   Abdominal:      General: Bowel sounds are normal. There is no distension.      Palpations: Abdomen is soft. There is no mass.      Tenderness: There is no abdominal tenderness. There is no guarding or rebound.   Musculoskeletal:         General: No tenderness or deformity. Normal range of motion.      Cervical back: Normal range of motion and neck supple.   Lymphadenopathy:      Head:      Right side of head: No submental, submandibular or tonsillar adenopathy.      Left side of head: No submental, submandibular or tonsillar adenopathy.      Cervical: No cervical adenopathy.      Upper Body:      Right upper body: No supraclavicular adenopathy.      Left upper body: No supraclavicular adenopathy.   Skin:     General: Skin is warm and dry.      Findings: No rash.   Neurological:      Mental Status: She is alert and oriented to person, place, and time.      Cranial Nerves: Cranial nerves are intact. No cranial nerve deficit.      Sensory: Sensation is intact. No sensory deficit.      Motor: Motor function is intact.      Coordination: Coordination is  intact. Coordination normal.      Gait: Gait is intact.   Psychiatric:         Attention and Perception: Attention normal.         Mood and Affect: Mood and affect normal.         Speech: Speech normal.         Behavior: Behavior normal. Behavior is cooperative.         Thought Content: Thought content normal.         Judgment: Judgment normal.           Assessment/Plan:   1. Cough  - SARS-CoV-2 PCR (24 hour In-House): Collect NP swab in VTM; Future  - benzonatate (TESSALON) 100 MG Cap; Take 2 Capsules by mouth 3 times a day as needed.  Dispense: 60 capsule; Refill: 0  - promethazine-dextromethorphan (PROMETHAZINE-DM) 6.25-15 MG/5ML syrup; Take 5 mL by mouth 4 times a day as needed for Cough for up to 5 days.  Dispense: 100 mL; Refill: 0  - albuterol 108 (90 Base) MCG/ACT Aero Soln inhalation aerosol; Inhale 2 Puffs every 6 hours as needed for Shortness of Breath.  Dispense: 8.5 g; Refill: 0    2. Fatigue, unspecified type  - SARS-CoV-2 PCR (24 hour In-House): Collect NP swab in VTM; Future    3. Mild intermittent asthma without complication  - albuterol 108 (90 Base) MCG/ACT Aero Soln inhalation aerosol; Inhale 2 Puffs every 6 hours as needed for Shortness of Breath.  Dispense: 8.5 g; Refill: 0    Testing performed for COVID-19.  Patient/guardian is given printed /MyChart instructions regarding self-isolation.  Work/school note is provided with specific return to work/school protocols.  Reviewed with patient/guardian that if they do test positive they will be contacted by their local health department regarding return to work/school protocols.  Results will also be released to patient/guardian in MyChart or called to the patient/guardian directly.  Encouraged mask use, frequent handwashing, wiping down hard surfaces, etc.    Refill provided for Albuterol.  Tessalon for daytime cough.  Promethazine DM for nighttime cough, do not take while driving or working.    Nasal saline rinse  Over-the-counter Flonase nasal  spray per 's instructions  Mucinex as needed    Increase fluids, rest.  Patient may use salt water gargles, ice pops, cool fluids.    May use Tylenol or ibuprofen over-the-counter as needed for pain or fever not to exceed recommended daily dose.      Differential diagnosis, natural history, supportive care, and indications for immediate follow-up discussed.     Red flag warning symptoms and strict ER/follow-up precautions given.  The patient demonstrated a good understanding and agreed with the treatment plan.    Upon entering exam room I ensured patient was wearing a mask.  This provider wore appropriate PPE throughout entire visit.  Patient wore mask entire visit except for a brief period while examining oropharynx.    Please note that this note was created using voice recognition speech to text software. Every effort has been made to correct obvious errors.  However, I expect there are errors of grammar and possibly context that were not discovered prior to finalizing the note  GUILLERMO Alfaro PA-C

## 2021-07-30 NOTE — LETTER
July 30, 2021         Patient: Gladis Tovar   YOB: 1970   Date of Visit: 7/30/2021           To Whom it May Concern:    Concern for COVID-19 illness has been identified and testing is in progress.  The results are available through our electronic delivery system called [employer].    We are asking employers to excuse absence while they follow self isolation protocol per CDC guidelines    • At least 10 days since symptoms first appeared AND  • At least 24 hours with no fever greater than 100.4 without fever reducing medication AND  • Symptoms have improved.     If results are negative, you must continue to follow the self-isolation protocol. You may return to work when you have no fever for at least 24 hours without the use of fever-reducing medication, and symptoms have improved.     If the results of testing are positive then you will be contacted by your [facility] department for further instructions on duration of self-isolation and return to work. In general, this will also follow the CDC guidelines with minimum of 10 days from the onset of symptoms, and symptoms are improving without fever.       This is the only note that will be provided from [facility] for this visit. Please schedule a visit with a primary care provider if FMLA, disability, or unemployment paperwork is required.       Sincerely,    Stephenie Alfaro, P.A.-C.  [phone]      Electronically Signed

## 2021-08-01 ENCOUNTER — OFFICE VISIT (OUTPATIENT)
Dept: URGENT CARE | Facility: PHYSICIAN GROUP | Age: 51
End: 2021-08-01
Payer: COMMERCIAL

## 2021-08-01 VITALS
HEART RATE: 111 BPM | DIASTOLIC BLOOD PRESSURE: 66 MMHG | OXYGEN SATURATION: 97 % | WEIGHT: 210 LBS | TEMPERATURE: 99.3 F | RESPIRATION RATE: 16 BRPM | SYSTOLIC BLOOD PRESSURE: 108 MMHG | BODY MASS INDEX: 30.06 KG/M2 | HEIGHT: 70 IN

## 2021-08-01 DIAGNOSIS — T36.95XA ANTIBIOTIC-INDUCED YEAST INFECTION: ICD-10-CM

## 2021-08-01 DIAGNOSIS — R50.9 FEVER, UNSPECIFIED FEVER CAUSE: ICD-10-CM

## 2021-08-01 DIAGNOSIS — H66.003 NON-RECURRENT ACUTE SUPPURATIVE OTITIS MEDIA OF BOTH EARS WITHOUT SPONTANEOUS RUPTURE OF TYMPANIC MEMBRANES: ICD-10-CM

## 2021-08-01 DIAGNOSIS — B37.9 ANTIBIOTIC-INDUCED YEAST INFECTION: ICD-10-CM

## 2021-08-01 DIAGNOSIS — J06.9 VIRAL URI WITH COUGH: ICD-10-CM

## 2021-08-01 LAB
SARS-COV-2 RNA RESP QL NAA+PROBE: NOTDETECTED
SPECIMEN SOURCE: NORMAL

## 2021-08-01 PROCEDURE — 99214 OFFICE O/P EST MOD 30 MIN: CPT | Performed by: PHYSICIAN ASSISTANT

## 2021-08-01 RX ORDER — FLUCONAZOLE 150 MG/1
150 TABLET ORAL ONCE
Qty: 1 TABLET | Refills: 0 | Status: SHIPPED | OUTPATIENT
Start: 2021-08-01 | End: 2021-08-01

## 2021-08-01 RX ORDER — AMOXICILLIN AND CLAVULANATE POTASSIUM 875; 125 MG/1; MG/1
1 TABLET, FILM COATED ORAL 2 TIMES DAILY
Qty: 14 TABLET | Refills: 0 | Status: SHIPPED | OUTPATIENT
Start: 2021-08-01 | End: 2021-08-08

## 2021-08-01 ASSESSMENT — ENCOUNTER SYMPTOMS
SORE THROAT: 1
CHILLS: 1
SHORTNESS OF BREATH: 1
PALPITATIONS: 0
NAUSEA: 0
DIZZINESS: 0
MYALGIAS: 1
HEADACHES: 1
WHEEZING: 0
SPUTUM PRODUCTION: 1
SINUS PAIN: 1
DIARRHEA: 0
FEVER: 1
COUGH: 1
ABDOMINAL PAIN: 0
VOMITING: 0

## 2021-08-01 ASSESSMENT — FIBROSIS 4 INDEX: FIB4 SCORE: 1.36

## 2021-08-01 NOTE — PROGRESS NOTES
Subjective:      Gladis Tovar is a 50 y.o. female who presents with Cough (sinus pain/pressure, sinus headache, coughing up mucus, sneezing, eye pain,  x4 days)    HPI:  Gladis Tovar is a 50 y.o. female who presents today for evaluation of cough and URI symptoms.  Patient was initially seen in the urgent care for similar symptoms 2 days ago.  At that time symptoms have been present for 1 day.  Patient was prescribed promethazine-DM cough syrup and benzonatate.  She was also prescribed an albuterol inhaler for history of cold-induced asthma.  Covid test was done.  Results are still pending.  Patient returns today stating that the cough medications are not helping at all and she has continued to feel worse.  She states that she has moderate to severe sinus pain/pressure that gets worse when she leans forward.  She also notes pain and pressure in her ears.  She has continued to have headache, cough that is productive of mucus, sneezing, and eye pain.  States that she has had subjective fever, chills, and cold sweats at home.  Patient is fully vaccinated for COVID-19 virus.  She received the second dose of the [employer] vaccine on 3/18/2021.          Review of Systems   Constitutional: Positive for chills, fever and malaise/fatigue.   HENT: Positive for congestion, ear pain, sinus pain and sore throat.    Respiratory: Positive for cough, sputum production and shortness of breath. Negative for wheezing.    Cardiovascular: Negative for chest pain and palpitations.   Gastrointestinal: Negative for abdominal pain, diarrhea, nausea and vomiting.   Musculoskeletal: Positive for myalgias.   Skin: Negative for rash.   Neurological: Positive for headaches. Negative for dizziness.       PMH:  has a past medical history of Asthma (05/17/2019), Depression, Snoring, and Urinary incontinence (05/17/2019).  MEDS:   Current Outpatient Medications:   •  benzonatate (TESSALON) 100 MG Cap, Take 2 Capsules by mouth 3 times a day as needed., Disp:  60 capsule, Rfl: 0  •  promethazine-dextromethorphan (PROMETHAZINE-DM) 6.25-15 MG/5ML syrup, Take 5 mL by mouth 4 times a day as needed for Cough for up to 5 days., Disp: 100 mL, Rfl: 0  •  albuterol 108 (90 Base) MCG/ACT Aero Soln inhalation aerosol, Inhale 2 Puffs every 6 hours as needed for Shortness of Breath., Disp: 8.5 g, Rfl: 0  •  Mirabegron ER (MYRBETRIQ) 25 MG TABLET SR 24 HR, Myrbetriq 25 mg tablet,extended release  TAKE 1 TABLET BY MOUTH EVERY DAY, Disp: , Rfl:   •  ergocalciferol (DRISDOL) 64216 UNIT capsule, Take 1 capsule by mouth Every Sunday and Wednesday., Disp: 24 capsule, Rfl: 3  •  levothyroxine (SYNTHROID) 50 MCG Tab, Take 1 tablet by mouth every morning on an empty stomach., Disp: 90 tablet, Rfl: 1  •  FLUoxetine (PROZAC) 20 MG Cap, TAKE 1 CAPSULE BY MOUTH EVERY DAY, Disp: 90 capsule, Rfl: 1  •  LORATADINE PO, loratadine, Disp: , Rfl:   •  buPROPion (WELLBUTRIN) 100 MG Tab, Take 1 Tab by mouth 2 times a day., Disp: 180 Tab, Rfl: 2  •  Albuterol Sulfate 108 (90 Base) MCG/ACT AEROSOL POWDER, BREATH ACTIVATED, Inhale 1-2 Puffs by mouth as needed., Disp: , Rfl:   ALLERGIES:   Allergies   Allergen Reactions   • Codeine Unspecified     Heart stops  RXN=age 3     SURGHX:   Past Surgical History:   Procedure Laterality Date   • PB PELVIC EXAMINATION W ANESTH N/A 6/24/2019    Procedure: EXAM UNDER ANESTHESIA, PELVIS;  Surgeon: Edwina Miranda M.D.;  Location: SURGERY SAME DAY [facility];  Service: Gynecology   • PB LAP,DIAGNOSTIC ABDOMEN  6/24/2019    Procedure: PELVISCOPY;  Surgeon: Edwina Miranda M.D.;  Location: SURGERY SAME DAY [facility];  Service: Gynecology   • CYSTOSCOPY  6/24/2019    Procedure: CYSTOSCOPY;  Surgeon: Edwina Miranda M.D.;  Location: SURGERY SAME DAY [facility];  Service: Gynecology   • VAGINAL HYSTERECTOMY SCOPE TOTAL  6/24/2019    Procedure: HYSTERECTOMY, TOTAL, VAGINAL, LAPAROSCOPY-ASSISTED;  Surgeon: Edwina Miranda M.D.;  Location:  "SURGERY SAME DAY [facility];  Service: Gynecology   • PB PELVIC EXAMINATION W ANESTH  5/23/2019    Procedure: EXAM UNDER ANESTHESIA, PELVIS;  Surgeon: Edwina Miranda M.D.;  Location: SURGERY SAME DAY [facility];  Service: Gynecology   • PB LAP,DIAGNOSTIC ABDOMEN  5/23/2019    Procedure: PELVISCOPY;  Surgeon: Edwina Miranda M.D.;  Location: SURGERY SAME DAY [facility];  Service: Gynecology   • PB HYSTEROSCOPY,DX,SEP PROC  5/23/2019    Procedure: HYSTEROSCOPY, DIAGNOSTIC- AND EXCISION OF POLY/FIBROID AND ENDOMETRIAL CURETTAGE;  Surgeon: Edwina Miranda M.D.;  Location: SURGERY SAME DAY [facility];  Service: Gynecology   • HYSTEROSCOPY WITH VIDEO OPERATIVE  5/23/2019    Procedure: HYSTEROSCOPY, WITH VIDEO IMAGING;  Surgeon: Edwina Miranda M.D.;  Location: SURGERY SAME DAY [facility];  Service: Gynecology   • HYSTEROSCOPY WITH MYOSURE  5/23/2019    Procedure: HYSTEROSCOPY, WITH TISSUE REMOVAL, USING HYSTEROSCOPIC ROTATING CUTTER BLADE;  Surgeon: Edwina Miranda M.D.;  Location: SURGERY SAME DAY [facility];  Service: Gynecology   • SALPINGECTOMY Bilateral 5/23/2019    Procedure: SALPINGECTOMY;  Surgeon: Edwina Miranda M.D.;  Location: SURGERY SAME DAY [facility];  Service: Gynecology   • SHOULDER SURGERY Bilateral 2012   • APPENDECTOMY  1994   • BREAST RECONSTRUCTION  1992    REDUCTION   • CERVICAL CERCLAGE  1994/1996/1999    x 3   • TONSILLECTOMY       SOCHX:  reports that she has never smoked. She has never used smokeless tobacco. She reports current alcohol use. She reports that she does not use drugs.  FH: Family history was reviewed, no pertinent findings to report     Objective:     /66 (BP Location: Left arm, Patient Position: Sitting, BP Cuff Size: Adult)   Pulse (!) 111   Temp 37.4 °C (99.3 °F) (Temporal)   Resp 16   Ht 1.778 m (5' 10\")   Wt 95.3 kg (210 lb)   LMP  (LMP Unknown)   SpO2 97%   BMI 30.13 kg/m²      Physical " Exam  Constitutional:       Appearance: She is well-developed.   HENT:      Head: Normocephalic and atraumatic.      Right Ear: Ear canal and external ear normal.      Left Ear: Ear canal and external ear normal.      Ears:      Comments: Bilateral TMs are bulging and dull with blunting of the bony landmarks.  There is some erythematous injection noted as well.  Eyes:      Conjunctiva/sclera: Conjunctivae normal.      Pupils: Pupils are equal, round, and reactive to light.   Cardiovascular:      Rate and Rhythm: Regular rhythm. Tachycardia present.      Heart sounds: Normal heart sounds. No murmur heard.     Pulmonary:      Effort: Pulmonary effort is normal.      Breath sounds: Normal breath sounds. No decreased breath sounds, wheezing, rhonchi or rales.   Musculoskeletal:      Cervical back: Normal range of motion.   Lymphadenopathy:      Cervical: No cervical adenopathy.   Skin:     General: Skin is warm and dry.      Capillary Refill: Capillary refill takes less than 2 seconds.   Neurological:      Mental Status: She is alert and oriented to person, place, and time.   Psychiatric:         Behavior: Behavior normal.         Judgment: Judgment normal.                 Assessment/Plan:     1. Viral URI with cough  - OTC cold/flu medications  - PO fluids  - Rest  - Tylenol or ibuprofen as needed for fever > 100.4 F    2. Non-recurrent acute suppurative otitis media of both ears without spontaneous rupture of tympanic membranes  - amoxicillin-clavulanate (AUGMENTIN) 875-125 MG Tab; Take 1 tablet by mouth 2 times a day for 7 days.  Dispense: 14 tablet; Refill: 0    3. Fever    *Discussed with patient that the majority of her symptoms, including the sinusitis, still seem more likely viral in nature but she has developed some ear infections bilaterally over the past few days.  These will be treated with a 7-day course of Augmentin.   COVID-19 results are still pending.  Continued self-isolation instructions  discussed.            Differential Diagnosis, natural history, and supportive care discussed. Return to the Urgent Care or follow up with your PCP if symptoms fail to resolve, or for any new or worsening symptoms. Emergency room precautions discussed. Patient and/or family appears understanding of information.

## 2021-09-22 DIAGNOSIS — F33.1 MODERATE EPISODE OF RECURRENT MAJOR DEPRESSIVE DISORDER (HCC): ICD-10-CM

## 2021-09-23 RX ORDER — BUPROPION HYDROCHLORIDE 100 MG/1
TABLET ORAL
Qty: 180 TABLET | Refills: 2 | Status: SHIPPED | OUTPATIENT
Start: 2021-09-23 | End: 2022-06-17

## 2021-09-23 NOTE — TELEPHONE ENCOUNTER
Received request via: Patient    Was the patient seen in the last year in this department? Yes    Does the 7/15/21patient have an active prescription (recently filled or refills available) for medication(s) requested? No

## 2021-09-28 ENCOUNTER — OFFICE VISIT (OUTPATIENT)
Dept: URGENT CARE | Facility: PHYSICIAN GROUP | Age: 51
End: 2021-09-28
Payer: COMMERCIAL

## 2021-09-28 VITALS
TEMPERATURE: 97.3 F | HEART RATE: 96 BPM | DIASTOLIC BLOOD PRESSURE: 62 MMHG | OXYGEN SATURATION: 97 % | SYSTOLIC BLOOD PRESSURE: 104 MMHG | WEIGHT: 205 LBS | HEIGHT: 65 IN | RESPIRATION RATE: 18 BRPM | BODY MASS INDEX: 34.16 KG/M2

## 2021-09-28 DIAGNOSIS — H65.02 NON-RECURRENT ACUTE SEROUS OTITIS MEDIA OF LEFT EAR: ICD-10-CM

## 2021-09-28 PROCEDURE — 99213 OFFICE O/P EST LOW 20 MIN: CPT | Performed by: PHYSICIAN ASSISTANT

## 2021-09-28 RX ORDER — CEFDINIR 300 MG/1
300 CAPSULE ORAL 2 TIMES DAILY
Qty: 14 CAPSULE | Refills: 0 | Status: SHIPPED | OUTPATIENT
Start: 2021-09-28 | End: 2021-10-05

## 2021-09-28 ASSESSMENT — ENCOUNTER SYMPTOMS
SHORTNESS OF BREATH: 0
EYE DISCHARGE: 0
HEADACHES: 0
VOMITING: 0
COUGH: 0
SORE THROAT: 0
EYE REDNESS: 0
NAUSEA: 0
FEVER: 0
DIARRHEA: 0

## 2021-09-28 ASSESSMENT — FIBROSIS 4 INDEX: FIB4 SCORE: 1.36

## 2021-09-28 NOTE — PROGRESS NOTES
Subjective     Gladis Tovar is a 50 y.o. female who presents with Otalgia (left ear, painful,x 4 days.)            Otalgia   There is pain in the left ear. This is a new problem. Episode onset: x 4 days ago. The problem occurs constantly. The problem has been unchanged. There has been no fever. The pain is mild. Pertinent negatives include no coughing, diarrhea, ear discharge, headaches, rash, sore throat or vomiting. She has tried acetaminophen for the symptoms.     The patient states she was recently diagnosed with a sinus infection and bilateral ear infection at the beginning of August.  The patient states her symptoms are similar to her previous ear infection.    PMH:  has a past medical history of Asthma (05/17/2019), Depression, Snoring, and Urinary incontinence (05/17/2019).  MEDS:   Current Outpatient Medications:   •  buPROPion (WELLBUTRIN) 100 MG Tab, TAKE 1 TABLET BY MOUTH TWICE A DAY, Disp: 180 Tablet, Rfl: 2  •  vitamin D, Ergocalciferol, (DRISDOL) 1.25 MG (72647 UT) Cap capsule, TAKE 1 CAPSULE BY MOUTH EVERY SUNDAY AND WEDNESDAY, Disp: 24 Capsule, Rfl: 0  •  albuterol 108 (90 Base) MCG/ACT Aero Soln inhalation aerosol, Inhale 2 Puffs every 6 hours as needed for Shortness of Breath., Disp: 8.5 g, Rfl: 0  •  Mirabegron ER (MYRBETRIQ) 25 MG TABLET SR 24 HR, Myrbetriq 25 mg tablet,extended release  TAKE 1 TABLET BY MOUTH EVERY DAY, Disp: , Rfl:   •  levothyroxine (SYNTHROID) 50 MCG Tab, Take 1 tablet by mouth every morning on an empty stomach., Disp: 90 tablet, Rfl: 1  •  FLUoxetine (PROZAC) 20 MG Cap, TAKE 1 CAPSULE BY MOUTH EVERY DAY, Disp: 90 capsule, Rfl: 1  •  LORATADINE PO, loratadine, Disp: , Rfl:   •  Albuterol Sulfate 108 (90 Base) MCG/ACT AEROSOL POWDER, BREATH ACTIVATED, Inhale 1-2 Puffs by mouth as needed., Disp: , Rfl:   •  benzonatate (TESSALON) 100 MG Cap, Take 2 Capsules by mouth 3 times a day as needed. (Patient not taking: Reported on 9/28/2021), Disp: 60 capsule, Rfl: 0  ALLERGIES:    Allergies   Allergen Reactions   • Codeine Unspecified     Heart stops  RXN=age 3     SURGHX:   Past Surgical History:   Procedure Laterality Date   • PB PELVIC EXAMINATION W ANESTH N/A 6/24/2019    Procedure: EXAM UNDER ANESTHESIA, PELVIS;  Surgeon: Edwina Miranda M.D.;  Location: SURGERY SAME DAY [facility];  Service: Gynecology   • PB LAP,DIAGNOSTIC ABDOMEN  6/24/2019    Procedure: PELVISCOPY;  Surgeon: Edwina Miranda M.D.;  Location: SURGERY SAME DAY [facility];  Service: Gynecology   • CYSTOSCOPY  6/24/2019    Procedure: CYSTOSCOPY;  Surgeon: Edwina Miranda M.D.;  Location: SURGERY SAME DAY [facility];  Service: Gynecology   • VAGINAL HYSTERECTOMY SCOPE TOTAL  6/24/2019    Procedure: HYSTERECTOMY, TOTAL, VAGINAL, LAPAROSCOPY-ASSISTED;  Surgeon: Edwina Miranda M.D.;  Location: SURGERY SAME DAY [facility];  Service: Gynecology   • PB PELVIC EXAMINATION W ANESTH  5/23/2019    Procedure: EXAM UNDER ANESTHESIA, PELVIS;  Surgeon: Edwina Miranda M.D.;  Location: SURGERY SAME DAY [facility];  Service: Gynecology   • PB LAP,DIAGNOSTIC ABDOMEN  5/23/2019    Procedure: PELVISCOPY;  Surgeon: Edwina Miranda M.D.;  Location: SURGERY SAME DAY [facility];  Service: Gynecology   • PB HYSTEROSCOPY,DX,SEP PROC  5/23/2019    Procedure: HYSTEROSCOPY, DIAGNOSTIC- AND EXCISION OF POLY/FIBROID AND ENDOMETRIAL CURETTAGE;  Surgeon: Edwina Miranda M.D.;  Location: SURGERY SAME DAY [facility];  Service: Gynecology   • HYSTEROSCOPY WITH VIDEO OPERATIVE  5/23/2019    Procedure: HYSTEROSCOPY, WITH VIDEO IMAGING;  Surgeon: Edwina Miranda M.D.;  Location: SURGERY SAME DAY [facility];  Service: Gynecology   • HYSTEROSCOPY WITH MYOSURE  5/23/2019    Procedure: HYSTEROSCOPY, WITH TISSUE REMOVAL, USING HYSTEROSCOPIC ROTATING CUTTER BLADE;  Surgeon: Edwina Miranda M.D.;  Location: SURGERY SAME DAY [facility];  Service: Gynecology   • SALPINGECTOMY  "Bilateral 5/23/2019    Procedure: SALPINGECTOMY;  Surgeon: Edwina Miranda M.D.;  Location: SURGERY SAME DAY [facility];  Service: Gynecology   • SHOULDER SURGERY Bilateral 2012   • APPENDECTOMY  1994   • BREAST RECONSTRUCTION  1992    REDUCTION   • CERVICAL CERCLAGE  1994/1996/1999    x 3   • TONSILLECTOMY       SOCHX:  reports that she has never smoked. She has never used smokeless tobacco. She reports current alcohol use. She reports that she does not use drugs.  FH: Family history was reviewed, no pertinent findings to report     Review of Systems   Constitutional: Negative for fever.   HENT: Positive for ear pain. Negative for congestion, ear discharge and sore throat.    Eyes: Negative for discharge and redness.   Respiratory: Negative for cough and shortness of breath.    Cardiovascular: Negative for chest pain and leg swelling.   Gastrointestinal: Negative for diarrhea, nausea and vomiting.   Musculoskeletal: Negative for joint pain.   Skin: Negative for rash.   Neurological: Negative for headaches.        Objective     /62 (BP Location: Right arm, Patient Position: Sitting, BP Cuff Size: Adult)   Pulse 96   Temp 36.3 °C (97.3 °F) (Temporal)   Resp 18   Ht 1.651 m (5' 5\")   Wt 93 kg (205 lb)   LMP  (LMP Unknown)   SpO2 97%   BMI 34.11 kg/m²      Physical Exam  Constitutional:       General: She is not in acute distress.     Appearance: Normal appearance. She is not ill-appearing.   HENT:      Head: Normocephalic and atraumatic.      Right Ear: Tympanic membrane, ear canal and external ear normal.      Left Ear: Ear canal and external ear normal. No mastoid tenderness. Tympanic membrane is injected (slight) and bulging.      Ears:      Comments:   The patient's left TM is bulging and dull with slight injection.     Nose: Nose normal.      Mouth/Throat:      Mouth: Mucous membranes are moist.      Pharynx: Oropharynx is clear. No posterior oropharyngeal erythema.   Eyes:      " Extraocular Movements: Extraocular movements intact.      Conjunctiva/sclera: Conjunctivae normal.   Cardiovascular:      Rate and Rhythm: Normal rate and regular rhythm.      Heart sounds: Normal heart sounds.   Pulmonary:      Effort: Pulmonary effort is normal. No respiratory distress.      Breath sounds: Normal breath sounds. No wheezing.   Musculoskeletal:         General: Normal range of motion.      Cervical back: Normal range of motion and neck supple.   Lymphadenopathy:      Head:      Left side of head: No preauricular or posterior auricular adenopathy.      Cervical:      Left cervical: No superficial cervical adenopathy.   Skin:     General: Skin is warm and dry.   Neurological:      Mental Status: She is alert and oriented to person, place, and time.                           Assessment & Plan        1. Non-recurrent acute serous otitis media of left ear  - cefdinir (OMNICEF) 300 MG Cap; Take 1 Capsule by mouth 2 times a day for 7 days.  Dispense: 14 Capsule; Refill: 0    Differential diagnoses, supportive care, and indications for immediate follow-up discussed with patient.   Instructed to return to clinic or nearest emergency department for any change in condition, further concerns, or worsening of symptoms.    OTC Tylenol or Motrin for fever/discomfort.  OTC antihistamine for symptomatic relief  OTC Flonase for symptomatic relief  Drink plenty of fluids  Follow-up with PCP  Return to clinic or go to the ED if symptoms worsen or fail to improve, or if the patient should develop worsening/increasing ear pain, drainage from the affected ear, cough, congestion, sore throat, fever/chills, and/or any concerning symptoms.    Discussed plan with the patient, and she agrees to the above.     I personally reviewed prior external notes and test results pertinent to today's visit.  I have independently reviewed and interpreted all diagnostics ordered during this urgent care visit.     Time spent evaluating this  patient was at least 30 minutes and includes preparing for visit, obtaining history, exam and evaluation, ordering labs/tests/procedures/medications, independent interpretation, and counseling/education.    Please note that this dictation was created using voice recognition software. I have made every reasonable attempt to correct obvious errors, but I expect that there may be errors of grammar and possibly content that I did not discover before finalizing the note.     This note was electronically signed by Katrina Lynn PA-C

## 2021-12-21 ENCOUNTER — PATIENT MESSAGE (OUTPATIENT)
Dept: MEDICAL GROUP | Facility: LAB | Age: 51
End: 2021-12-21

## 2021-12-21 DIAGNOSIS — E55.9 VITAMIN D INSUFFICIENCY: ICD-10-CM

## 2021-12-21 DIAGNOSIS — F33.1 MODERATE EPISODE OF RECURRENT MAJOR DEPRESSIVE DISORDER (HCC): ICD-10-CM

## 2021-12-21 RX ORDER — FLUOXETINE HYDROCHLORIDE 20 MG/1
20 CAPSULE ORAL
Qty: 90 CAPSULE | Refills: 1 | Status: SHIPPED | OUTPATIENT
Start: 2021-12-21 | End: 2022-06-17

## 2021-12-21 RX ORDER — ERGOCALCIFEROL 1.25 MG/1
CAPSULE ORAL
Qty: 24 CAPSULE | Refills: 0 | OUTPATIENT
Start: 2021-12-21

## 2021-12-21 NOTE — TELEPHONE ENCOUNTER
Received request via: Patient    Was the patient seen in the last year in this department? Yes  LOV 07/15/2021 - Telemedicine  Does the patient have an active prescription (recently filled or refills available) for medication(s) requested? No

## 2021-12-22 ENCOUNTER — HOSPITAL ENCOUNTER (OUTPATIENT)
Dept: LAB | Facility: MEDICAL CENTER | Age: 51
End: 2021-12-22
Attending: FAMILY MEDICINE
Payer: COMMERCIAL

## 2021-12-22 DIAGNOSIS — R74.8 ELEVATED ALKALINE PHOSPHATASE LEVEL: ICD-10-CM

## 2021-12-22 DIAGNOSIS — R73.03 PREDIABETES: ICD-10-CM

## 2021-12-22 DIAGNOSIS — E78.5 DYSLIPIDEMIA: ICD-10-CM

## 2021-12-22 DIAGNOSIS — E03.9 ACQUIRED HYPOTHYROIDISM: ICD-10-CM

## 2021-12-22 LAB
ALBUMIN SERPL BCP-MCNC: 4.3 G/DL (ref 3.2–4.9)
ALP SERPL-CCNC: 108 U/L (ref 30–99)
ALT SERPL-CCNC: 17 U/L (ref 2–50)
AST SERPL-CCNC: 21 U/L (ref 12–45)
BILIRUB CONJ SERPL-MCNC: <0.2 MG/DL (ref 0.1–0.5)
BILIRUB INDIRECT SERPL-MCNC: ABNORMAL MG/DL (ref 0–1)
BILIRUB SERPL-MCNC: 0.4 MG/DL (ref 0.1–1.5)
CHOLEST SERPL-MCNC: 229 MG/DL (ref 100–199)
EST. AVERAGE GLUCOSE BLD GHB EST-MCNC: 117 MG/DL
HBA1C MFR BLD: 5.7 % (ref 4–5.6)
HDLC SERPL-MCNC: 69 MG/DL
LDLC SERPL CALC-MCNC: 136 MG/DL
PROT SERPL-MCNC: 6.8 G/DL (ref 6–8.2)
T4 FREE SERPL-MCNC: 1.07 NG/DL (ref 0.93–1.7)
TRIGL SERPL-MCNC: 122 MG/DL (ref 0–149)
TSH SERPL DL<=0.005 MIU/L-ACNC: 1.22 UIU/ML (ref 0.38–5.33)

## 2021-12-22 PROCEDURE — 84443 ASSAY THYROID STIM HORMONE: CPT

## 2021-12-22 PROCEDURE — 84439 ASSAY OF FREE THYROXINE: CPT

## 2021-12-22 PROCEDURE — 36415 COLL VENOUS BLD VENIPUNCTURE: CPT

## 2021-12-22 PROCEDURE — 80061 LIPID PANEL: CPT

## 2021-12-22 PROCEDURE — 80076 HEPATIC FUNCTION PANEL: CPT

## 2021-12-22 PROCEDURE — 83036 HEMOGLOBIN GLYCOSYLATED A1C: CPT

## 2022-05-08 DIAGNOSIS — E03.9 ACQUIRED HYPOTHYROIDISM: ICD-10-CM

## 2022-05-09 RX ORDER — LEVOTHYROXINE SODIUM 0.05 MG/1
TABLET ORAL
Qty: 90 TABLET | Refills: 0 | Status: SHIPPED | OUTPATIENT
Start: 2022-05-09 | End: 2022-08-12 | Stop reason: SDUPTHER

## 2022-06-17 DIAGNOSIS — F33.1 MODERATE EPISODE OF RECURRENT MAJOR DEPRESSIVE DISORDER (HCC): ICD-10-CM

## 2022-06-17 RX ORDER — FLUOXETINE HYDROCHLORIDE 20 MG/1
20 CAPSULE ORAL
Qty: 90 CAPSULE | Refills: 1 | Status: SHIPPED | OUTPATIENT
Start: 2022-06-17 | End: 2023-01-18 | Stop reason: SDUPTHER

## 2022-06-17 RX ORDER — BUPROPION HYDROCHLORIDE 100 MG/1
TABLET ORAL
Qty: 180 TABLET | Refills: 2 | Status: SHIPPED | OUTPATIENT
Start: 2022-06-17 | End: 2022-08-12

## 2022-06-17 NOTE — TELEPHONE ENCOUNTER
Received request via: Pharmacy    Was the patient seen in the last year in this department? Yes  7/15/2021  Does the patient have an active prescription (recently filled or refills available) for medication(s) requested? No

## 2022-07-27 DIAGNOSIS — E03.9 ACQUIRED HYPOTHYROIDISM: ICD-10-CM

## 2022-07-27 RX ORDER — LEVOTHYROXINE SODIUM 0.05 MG/1
TABLET ORAL
Qty: 30 TABLET | Refills: 0 | OUTPATIENT
Start: 2022-07-27

## 2022-08-12 ENCOUNTER — TELEMEDICINE (OUTPATIENT)
Dept: MEDICAL GROUP | Facility: LAB | Age: 52
End: 2022-08-12
Payer: COMMERCIAL

## 2022-08-12 VITALS — BODY MASS INDEX: 34.11 KG/M2 | HEIGHT: 65 IN

## 2022-08-12 DIAGNOSIS — E78.5 DYSLIPIDEMIA: ICD-10-CM

## 2022-08-12 DIAGNOSIS — F33.1 MODERATE EPISODE OF RECURRENT MAJOR DEPRESSIVE DISORDER (HCC): ICD-10-CM

## 2022-08-12 DIAGNOSIS — E03.9 ACQUIRED HYPOTHYROIDISM: ICD-10-CM

## 2022-08-12 DIAGNOSIS — E55.9 VITAMIN D INSUFFICIENCY: ICD-10-CM

## 2022-08-12 PROCEDURE — 99214 OFFICE O/P EST MOD 30 MIN: CPT | Mod: 95 | Performed by: FAMILY MEDICINE

## 2022-08-12 RX ORDER — BUPROPION HYDROCHLORIDE 300 MG/1
300 TABLET ORAL EVERY MORNING
Qty: 90 TABLET | Refills: 0 | Status: SHIPPED | OUTPATIENT
Start: 2022-08-12 | End: 2022-10-26

## 2022-08-12 RX ORDER — LEVOTHYROXINE SODIUM 0.05 MG/1
50 TABLET ORAL
Qty: 90 TABLET | Refills: 0 | Status: SHIPPED | OUTPATIENT
Start: 2022-08-12 | End: 2022-10-26

## 2022-08-12 NOTE — PROGRESS NOTES
Virtual Visit: Established Patient   This visit was conducted via Zoom using secure and encrypted videoconferencing technology.   The patient was in a private area in the state of [place].    The patient's identity was confirmed and verbal consent was obtained for this virtual visit.     Subjective:   CC:   Chief Complaint   Patient presents with    Medication Problem       Gladis Tovar is a 51 y.o. female presenting for evaluation and management of:    Medication adjustment:   Struggling a bit mentally. More stressors, dad diagnosed with leukemia, lots of travel, trying to care for him. Mom sick as well, COPD, CKD. Both dogs sick wit cancer as well.   Now a bit worse since back at work. Irritable, feeling some darkness.   Also need some thyroid med checking. Would britany to get routine labs ordered too.   H/o loss of children in her 20's, started on fluoxetine. Then Dr Keenan added wellbutrin more recently.     Seeing counseling as well.   Avoiding alcohol.     3 flares with interstitial cystitis. Seeing [facility] for this.     ROS   See above.     Current medicines (including changes today)  Current Outpatient Medications   Medication Sig Dispense Refill    buPROPion (WELLBUTRIN) 100 MG Tab TAKE 1 TABLET BY MOUTH TWICE A  Tablet 2    FLUoxetine (PROZAC) 20 MG Cap TAKE 1 CAPSULE BY MOUTH EVERY DAY 90 Capsule 1    levothyroxine (SYNTHROID) 50 MCG Tab TAKE 1 TABLET BY MOUTH EVERY DAY IN THE MORNING ON AN EMPTY STOMACH 90 Tablet 0    vitamin D, Ergocalciferol, (DRISDOL) 1.25 MG (83554 UT) Cap capsule TAKE 1 CAPSULE BY MOUTH EVERY SUNDAY AND WEDNESDAY 24 Capsule 0    albuterol 108 (90 Base) MCG/ACT Aero Soln inhalation aerosol Inhale 2 Puffs every 6 hours as needed for Shortness of Breath. 8.5 g 0    Mirabegron ER (MYRBETRIQ) 25 MG TABLET SR 24 HR Myrbetriq 25 mg tablet,extended release   TAKE 1 TABLET BY MOUTH EVERY DAY      LORATADINE PO loratadine      Albuterol Sulfate 108 (90 Base) MCG/ACT AEROSOL POWDER, BREATH  "ACTIVATED Inhale 1-2 Puffs by mouth as needed.       No current facility-administered medications for this visit.       Patient Active Problem List    Diagnosis Date Noted    Acquired hypothyroidism 07/15/2021    Dyslipidemia 07/15/2021    Primary insomnia 02/11/2021    S/P hysterectomy 03/02/2020    Prediabetes 03/02/2020    Elevated alkaline phosphatase level 11/27/2019    Interstitial cystitis_[facility]  08/27/2019    Episode of recurrent major depressive disorder (HCC) 12/26/2018    Mild intermittent asthma without complication 12/26/2018    Class 2 obesity without serious comorbidity with body mass index (BMI) of 38.0 to 38.9 in adult 03/12/2018        Objective:   Ht 1.651 m (5' 5\")   LMP  (LMP Unknown)   BMI 34.11 kg/m²     Physical Exam:  Constitutional: Alert, no distress, well-groomed.  Skin: No rashes in visible areas.  Eye: Round. Conjunctiva clear, lids normal. No icterus.   ENMT: Lips pink without lesions, good dentition, moist mucous membranes. Phonation normal.  Neck: No masses, no thyromegaly. Moves freely without pain.  Respiratory: Unlabored respiratory effort, no cough or audible wheeze  Psych: Alert and oriented x3, normal affect and mood.     Assessment and Plan:   The following treatment plan was discussed:     1. Vitamin D insufficiency  Reviewed her labs.  Discussed vitamin D deficiency in the past.  She is had struggles getting this increased even with spending some time in the sun and supplementing.  We will get this rechecked.  - VITAMIN D,25 HYDROXY; Future    2. Acquired hypothyroidism  Has recently started thyroid supplementation.  We will get this rechecked.  - TSH; Future  - FREE THYROXINE; Future  - TRIIDOTHYRONINE; Future    3. Dyslipidemia  History of elevated cholesterol.  She is been working on diet and exercise and we will get this rechecked  - CBC WITH DIFFERENTIAL; Future  - Comp Metabolic Panel; Future  - HEMOGLOBIN A1C; Future  - Lipid Profile; Future    4. " Moderate episode of recurrent major depressive disorder (HCC)  Discussed symptoms and options for adjustment of medications.  At this time we will see if switching her to a XL version of the Wellbutrin and increasing the dose to 300 mg is beneficial.  We did discuss that this is probably the better antidepressant of the 2 mainly a little bit less somnolence and more energizing.  We did discuss possibility for making anxiety worse.  She will let us know this is going with some time.  Let her know what her labs are showing and she will also connect with us in the next month to evaluate if medication adjustments are better or worse.  - buPROPion (WELLBUTRIN XL) 300 MG XL tablet; Take 1 Tablet by mouth every morning for 90 days.  Dispense: 90 Tablet; Refill: 0      Follow-up: No follow-ups on file.

## 2022-08-23 ENCOUNTER — OFFICE VISIT (OUTPATIENT)
Dept: URGENT CARE | Facility: PHYSICIAN GROUP | Age: 52
End: 2022-08-23
Payer: COMMERCIAL

## 2022-08-23 VITALS
HEIGHT: 65 IN | SYSTOLIC BLOOD PRESSURE: 110 MMHG | HEART RATE: 99 BPM | OXYGEN SATURATION: 98 % | WEIGHT: 220 LBS | DIASTOLIC BLOOD PRESSURE: 70 MMHG | BODY MASS INDEX: 36.65 KG/M2 | RESPIRATION RATE: 20 BRPM | TEMPERATURE: 96.4 F

## 2022-08-23 DIAGNOSIS — H65.193 OTHER ACUTE NONSUPPURATIVE OTITIS MEDIA OF BOTH EARS, RECURRENCE NOT SPECIFIED: ICD-10-CM

## 2022-08-23 PROCEDURE — 99213 OFFICE O/P EST LOW 20 MIN: CPT | Performed by: NURSE PRACTITIONER

## 2022-08-23 RX ORDER — AMOXICILLIN AND CLAVULANATE POTASSIUM 875; 125 MG/1; MG/1
1 TABLET, FILM COATED ORAL 2 TIMES DAILY
Qty: 14 TABLET | Refills: 0 | Status: SHIPPED | OUTPATIENT
Start: 2022-08-23 | End: 2022-08-30

## 2022-08-23 ASSESSMENT — ENCOUNTER SYMPTOMS
COUGH: 1
NECK PAIN: 0
CARDIOVASCULAR NEGATIVE: 1
DIARRHEA: 0
EYE DISCHARGE: 0
WHEEZING: 0
FEVER: 0
NAUSEA: 0
SPUTUM PRODUCTION: 0
DIZZINESS: 0
MYALGIAS: 0
CHILLS: 0
WEAKNESS: 0
SHORTNESS OF BREATH: 0
VOMITING: 0
EYE REDNESS: 0
SORE THROAT: 1
ABDOMINAL PAIN: 0
CONSTIPATION: 0
SINUS PAIN: 1
HEADACHES: 0

## 2022-08-23 ASSESSMENT — FIBROSIS 4 INDEX: FIB4 SCORE: 1.15

## 2022-08-23 NOTE — PROGRESS NOTES
Subjective     Gladis Tovar is a 51 y.o. female who presents with Sore Throat (Sinus pain, bilateral earache, cough x2 days, possible sinus infection )            HPI  States has been experiencing nasal congestion, postnasal drainage, runny nose, cough x 2 days.  History of recurrent sinus issues with previous sinus surgery.  He is prone to sinus infections as well as ear infections.  Has been using her saline rinse 3x/day, over the counter multisymptom sinus decongestant medication.  Denies fever but does feel clammy.  No concern for COVID at this time does have at home test.  No history of COVID infection.    PMH:  has a past medical history of Asthma (05/17/2019), Depression, Snoring, and Urinary incontinence (05/17/2019).  MEDS:   Current Outpatient Medications:     amoxicillin-clavulanate (AUGMENTIN) 875-125 MG Tab, Take 1 Tablet by mouth 2 times a day for 7 days., Disp: 14 Tablet, Rfl: 0    buPROPion (WELLBUTRIN XL) 300 MG XL tablet, Take 1 Tablet by mouth every morning for 90 days., Disp: 90 Tablet, Rfl: 0    levothyroxine (SYNTHROID) 50 MCG Tab, Take 1 Tablet by mouth every morning on an empty stomach., Disp: 90 Tablet, Rfl: 0    FLUoxetine (PROZAC) 20 MG Cap, TAKE 1 CAPSULE BY MOUTH EVERY DAY, Disp: 90 Capsule, Rfl: 1    albuterol 108 (90 Base) MCG/ACT Aero Soln inhalation aerosol, Inhale 2 Puffs every 6 hours as needed for Shortness of Breath., Disp: 8.5 g, Rfl: 0    Mirabegron ER (MYRBETRIQ) 25 MG TABLET SR 24 HR, Myrbetriq 25 mg tablet,extended release  TAKE 1 TABLET BY MOUTH EVERY DAY, Disp: , Rfl:     vitamin D, Ergocalciferol, (DRISDOL) 1.25 MG (21634 UT) Cap capsule, TAKE 1 CAPSULE BY MOUTH EVERY SUNDAY AND WEDNESDAY, Disp: 24 Capsule, Rfl: 0  ALLERGIES:   Allergies   Allergen Reactions    Codeine Unspecified     Heart stops  RXN=age 3     SURGHX:   Past Surgical History:   Procedure Laterality Date    OK PELVIC EXAMINATION W ANESTH N/A 6/24/2019    Procedure: EXAM UNDER ANESTHESIA, PELVIS;  Surgeon:  Edwina Miranda M.D.;  Location: SURGERY SAME DAY [facility];  Service: Gynecology    WI LAP,DIAGNOSTIC ABDOMEN  6/24/2019    Procedure: PELVISCOPY;  Surgeon: Edwina Miranda M.D.;  Location: SURGERY SAME DAY [facility];  Service: Gynecology    CYSTOSCOPY  6/24/2019    Procedure: CYSTOSCOPY;  Surgeon: Edwina Miranda M.D.;  Location: SURGERY SAME DAY [facility];  Service: Gynecology    VAGINAL HYSTERECTOMY SCOPE TOTAL  6/24/2019    Procedure: HYSTERECTOMY, TOTAL, VAGINAL, LAPAROSCOPY-ASSISTED;  Surgeon: Edwina Miranda M.D.;  Location: SURGERY SAME DAY [facility];  Service: Gynecology    WI PELVIC EXAMINATION W ANESTH  5/23/2019    Procedure: EXAM UNDER ANESTHESIA, PELVIS;  Surgeon: Edwina Miranda M.D.;  Location: SURGERY SAME DAY [facility];  Service: Gynecology    WI LAP,DIAGNOSTIC ABDOMEN  5/23/2019    Procedure: PELVISCOPY;  Surgeon: Edwina Miranda M.D.;  Location: SURGERY SAME DAY [facility];  Service: Gynecology    WI HYSTEROSCOPY,DX,SEP PROC  5/23/2019    Procedure: HYSTEROSCOPY, DIAGNOSTIC- AND EXCISION OF POLY/FIBROID AND ENDOMETRIAL CURETTAGE;  Surgeon: Edwina Miranda M.D.;  Location: SURGERY SAME DAY [facility];  Service: Gynecology    HYSTEROSCOPY WITH VIDEO OPERATIVE  5/23/2019    Procedure: HYSTEROSCOPY, WITH VIDEO IMAGING;  Surgeon: Edwina Miranda M.D.;  Location: SURGERY SAME DAY [facility];  Service: Gynecology    HYSTEROSCOPY WITH MYOSURE  5/23/2019    Procedure: HYSTEROSCOPY, WITH TISSUE REMOVAL, USING HYSTEROSCOPIC ROTATING CUTTER BLADE;  Surgeon: Edwina Miranda M.D.;  Location: SURGERY SAME DAY [facility];  Service: Gynecology    SALPINGECTOMY Bilateral 5/23/2019    Procedure: SALPINGECTOMY;  Surgeon: Edwina Miranda M.D.;  Location: SURGERY SAME DAY [facility];  Service: Gynecology    SHOULDER SURGERY Bilateral 2012    APPENDECTOMY  1994    BREAST RECONSTRUCTION  1992    REDUCTION     "CERVICAL CERCLAGE  1994/1996/1999    x 3    TONSILLECTOMY       SOCHX:  reports that she has never smoked. She has never used smokeless tobacco. She reports current alcohol use. She reports that she does not use drugs.  FH: Family history was reviewed, no pertinent findings to report    Review of Systems   Constitutional:  Positive for malaise/fatigue. Negative for chills and fever.   HENT:  Positive for congestion, ear pain, sinus pain and sore throat.    Eyes:  Negative for discharge and redness.   Respiratory:  Positive for cough. Negative for sputum production, shortness of breath and wheezing.    Cardiovascular: Negative.    Gastrointestinal:  Negative for abdominal pain, constipation, diarrhea, nausea and vomiting.   Musculoskeletal:  Negative for myalgias and neck pain.   Skin:  Negative for itching and rash.   Neurological:  Negative for dizziness, weakness and headaches.   Endo/Heme/Allergies:  Negative for environmental allergies.   All other systems reviewed and are negative.           Objective     /70 (BP Location: Left arm, Patient Position: Sitting, BP Cuff Size: Adult)   Pulse 99   Temp (!) 35.8 °C (96.4 °F) (Temporal)   Resp 20   Ht 1.651 m (5' 5\")   Wt 99.8 kg (220 lb)   LMP  (LMP Unknown)   SpO2 98%   BMI 36.61 kg/m²      Physical Exam  Vitals reviewed.   Constitutional:       General: She is awake. She is not in acute distress.     Appearance: Normal appearance. She is well-developed. She is not ill-appearing, toxic-appearing or diaphoretic.   HENT:      Head: Normocephalic.      Right Ear: A middle ear effusion is present.      Left Ear: A middle ear effusion is present.      Ears:      Comments: Bilateral ear canal redness.     Nose: Mucosal edema, congestion and rhinorrhea present.      Mouth/Throat:      Lips: Pink.      Mouth: Mucous membranes are dry.      Pharynx: Uvula midline. Posterior oropharyngeal erythema present. No pharyngeal swelling, oropharyngeal exudate or uvula " swelling.      Tonsils: 0 on the right. 0 on the left.   Eyes:      Conjunctiva/sclera: Conjunctivae normal.      Pupils: Pupils are equal, round, and reactive to light.   Cardiovascular:      Rate and Rhythm: Normal rate.   Pulmonary:      Effort: Pulmonary effort is normal. No tachypnea, accessory muscle usage or respiratory distress.      Breath sounds: Normal breath sounds and air entry. No stridor, decreased air movement or transmitted upper airway sounds. No decreased breath sounds, wheezing, rhonchi or rales.   Musculoskeletal:         General: Normal range of motion.      Cervical back: Normal range of motion and neck supple.   Skin:     General: Skin is warm and dry.   Neurological:      Mental Status: She is alert and oriented to person, place, and time.   Psychiatric:         Attention and Perception: Attention normal.         Mood and Affect: Mood normal.         Speech: Speech normal.         Behavior: Behavior normal. Behavior is cooperative.                           Assessment & Plan        1. Other acute nonsuppurative otitis media of both ears, recurrence not specified    - amoxicillin-clavulanate (AUGMENTIN) 875-125 MG Tab; Take 1 Tablet by mouth 2 times a day for 7 days.  Dispense: 14 Tablet; Refill: 0  -May use over the counter NSAID for any fever or ear pain  -May use over the counter longer acting allergy medication for any sinus symptoms related to allergy related ear problems (chose one: Claritin/Zyrtec/Allegra without decongestant) x 1 week then as needed   -May use saline nasal spray for nasal congestion as needed up to 4x/day   -May use over the counter nasal decongestant like Flonase/Nasacort as needed for allergy related ear problems x 1 week then as needed   -Maintain hydration status   -May use humidifier for any dry cough   -Monitor for fevers, difficulty or abnormal hearing, pain in ears, headache, dizziness- need re-evaluation

## 2022-10-25 DIAGNOSIS — F33.1 MODERATE EPISODE OF RECURRENT MAJOR DEPRESSIVE DISORDER (HCC): ICD-10-CM

## 2022-10-25 DIAGNOSIS — E03.9 ACQUIRED HYPOTHYROIDISM: ICD-10-CM

## 2022-10-26 RX ORDER — LEVOTHYROXINE SODIUM 0.05 MG/1
TABLET ORAL
Qty: 90 TABLET | Refills: 0 | Status: SHIPPED | OUTPATIENT
Start: 2022-10-26 | End: 2023-01-30

## 2022-10-26 RX ORDER — BUPROPION HYDROCHLORIDE 300 MG/1
TABLET ORAL
Qty: 90 TABLET | Refills: 0 | Status: SHIPPED | OUTPATIENT
Start: 2022-10-26 | End: 2023-01-30

## 2022-11-01 PROBLEM — M79.671 RIGHT FOOT PAIN: Status: ACTIVE | Noted: 2022-11-01

## 2023-01-18 DIAGNOSIS — F33.1 MODERATE EPISODE OF RECURRENT MAJOR DEPRESSIVE DISORDER (HCC): ICD-10-CM

## 2023-01-18 RX ORDER — FLUOXETINE HYDROCHLORIDE 20 MG/1
20 CAPSULE ORAL
Qty: 90 CAPSULE | Refills: 1 | Status: SHIPPED | OUTPATIENT
Start: 2023-01-18 | End: 2023-03-07 | Stop reason: SDUPTHER

## 2023-01-18 NOTE — TELEPHONE ENCOUNTER
Received request via: Pharmacy    Was the patient seen in the last year in this department? Yes  8/12/22  Does the patient have an active prescription (recently filled or refills available) for medication(s) requested? No    Does the patient have senior living Plus and need 100 day supply (blood pressure, diabetes and cholesterol meds only)? Medication is not for cholesterol, blood pressure or diabetes

## 2023-01-27 DIAGNOSIS — F33.1 MODERATE EPISODE OF RECURRENT MAJOR DEPRESSIVE DISORDER (HCC): ICD-10-CM

## 2023-01-27 DIAGNOSIS — E03.9 ACQUIRED HYPOTHYROIDISM: ICD-10-CM

## 2023-01-30 RX ORDER — BUPROPION HYDROCHLORIDE 300 MG/1
TABLET ORAL
Qty: 90 TABLET | Refills: 0 | Status: SHIPPED | OUTPATIENT
Start: 2023-01-30 | End: 2023-04-27

## 2023-01-30 RX ORDER — LEVOTHYROXINE SODIUM 0.05 MG/1
TABLET ORAL
Qty: 90 TABLET | Refills: 0 | Status: SHIPPED | OUTPATIENT
Start: 2023-01-30 | End: 2023-04-27

## 2023-01-30 NOTE — TELEPHONE ENCOUNTER
Received request via: Pharmacy    Was the patient seen in the last year in this department? Yes  LOV : 8/12/2022   Does the patient have an active prescription (recently filled or refills available) for medication(s) requested? No    Does the patient have [facility] Plus and need 100 day supply (blood pressure, diabetes and cholesterol meds only)? Patient does not have SCP

## 2023-02-23 ENCOUNTER — HOSPITAL ENCOUNTER (OUTPATIENT)
Dept: RADIOLOGY | Facility: MEDICAL CENTER | Age: 53
End: 2023-02-23
Attending: ORTHOPAEDIC SURGERY
Payer: COMMERCIAL

## 2023-02-23 DIAGNOSIS — Z98.1 HISTORY OF SURGICAL FUSION JOINT: ICD-10-CM

## 2023-02-23 PROCEDURE — 73700 CT LOWER EXTREMITY W/O DYE: CPT | Mod: RT

## 2023-03-03 ENCOUNTER — HOSPITAL ENCOUNTER (OUTPATIENT)
Dept: LAB | Facility: MEDICAL CENTER | Age: 53
End: 2023-03-03
Attending: FAMILY MEDICINE
Payer: COMMERCIAL

## 2023-03-03 DIAGNOSIS — E78.5 DYSLIPIDEMIA: ICD-10-CM

## 2023-03-03 DIAGNOSIS — E55.9 VITAMIN D INSUFFICIENCY: ICD-10-CM

## 2023-03-03 DIAGNOSIS — E03.9 ACQUIRED HYPOTHYROIDISM: ICD-10-CM

## 2023-03-03 LAB
25(OH)D3 SERPL-MCNC: 44 NG/ML (ref 30–100)
ALBUMIN SERPL BCP-MCNC: 4.5 G/DL (ref 3.2–4.9)
ALBUMIN/GLOB SERPL: 1.8 G/DL
ALP SERPL-CCNC: 121 U/L (ref 30–99)
ALT SERPL-CCNC: 12 U/L (ref 2–50)
ANION GAP SERPL CALC-SCNC: 7 MMOL/L (ref 7–16)
AST SERPL-CCNC: 17 U/L (ref 12–45)
BASOPHILS # BLD AUTO: 0.7 % (ref 0–1.8)
BASOPHILS # BLD: 0.05 K/UL (ref 0–0.12)
BILIRUB SERPL-MCNC: 0.4 MG/DL (ref 0.1–1.5)
BUN SERPL-MCNC: 16 MG/DL (ref 8–22)
CALCIUM ALBUM COR SERPL-MCNC: 9.1 MG/DL (ref 8.5–10.5)
CALCIUM SERPL-MCNC: 9.5 MG/DL (ref 8.5–10.5)
CHLORIDE SERPL-SCNC: 106 MMOL/L (ref 96–112)
CHOLEST SERPL-MCNC: 219 MG/DL (ref 100–199)
CO2 SERPL-SCNC: 29 MMOL/L (ref 20–33)
CREAT SERPL-MCNC: 0.83 MG/DL (ref 0.5–1.4)
EOSINOPHIL # BLD AUTO: 0.19 K/UL (ref 0–0.51)
EOSINOPHIL NFR BLD: 2.7 % (ref 0–6.9)
ERYTHROCYTE [DISTWIDTH] IN BLOOD BY AUTOMATED COUNT: 43 FL (ref 35.9–50)
EST. AVERAGE GLUCOSE BLD GHB EST-MCNC: 114 MG/DL
FASTING STATUS PATIENT QL REPORTED: NORMAL
GFR SERPLBLD CREATININE-BSD FMLA CKD-EPI: 84 ML/MIN/1.73 M 2
GLOBULIN SER CALC-MCNC: 2.5 G/DL (ref 1.9–3.5)
GLUCOSE SERPL-MCNC: 100 MG/DL (ref 65–99)
HBA1C MFR BLD: 5.6 % (ref 4–5.6)
HCT VFR BLD AUTO: 44.3 % (ref 37–47)
HDLC SERPL-MCNC: 48 MG/DL
HGB BLD-MCNC: 14.1 G/DL (ref 12–16)
IMM GRANULOCYTES # BLD AUTO: 0.03 K/UL (ref 0–0.11)
IMM GRANULOCYTES NFR BLD AUTO: 0.4 % (ref 0–0.9)
LDLC SERPL CALC-MCNC: 147 MG/DL
LYMPHOCYTES # BLD AUTO: 2.42 K/UL (ref 1–4.8)
LYMPHOCYTES NFR BLD: 34.5 % (ref 22–41)
MCH RBC QN AUTO: 27.1 PG (ref 27–33)
MCHC RBC AUTO-ENTMCNC: 31.8 G/DL (ref 33.6–35)
MCV RBC AUTO: 85.2 FL (ref 81.4–97.8)
MONOCYTES # BLD AUTO: 0.62 K/UL (ref 0–0.85)
MONOCYTES NFR BLD AUTO: 8.8 % (ref 0–13.4)
NEUTROPHILS # BLD AUTO: 3.7 K/UL (ref 2–7.15)
NEUTROPHILS NFR BLD: 52.9 % (ref 44–72)
NRBC # BLD AUTO: 0 K/UL
NRBC BLD-RTO: 0 /100 WBC
PLATELET # BLD AUTO: 233 K/UL (ref 164–446)
PMV BLD AUTO: 10.2 FL (ref 9–12.9)
POTASSIUM SERPL-SCNC: 4.5 MMOL/L (ref 3.6–5.5)
PROT SERPL-MCNC: 7 G/DL (ref 6–8.2)
RBC # BLD AUTO: 5.2 M/UL (ref 4.2–5.4)
SODIUM SERPL-SCNC: 142 MMOL/L (ref 135–145)
T3 SERPL-MCNC: 97.5 NG/DL (ref 60–181)
T4 FREE SERPL-MCNC: 1.1 NG/DL (ref 0.93–1.7)
TRIGL SERPL-MCNC: 121 MG/DL (ref 0–149)
TSH SERPL DL<=0.005 MIU/L-ACNC: 0.79 UIU/ML (ref 0.38–5.33)
WBC # BLD AUTO: 7 K/UL (ref 4.8–10.8)

## 2023-03-03 PROCEDURE — 36415 COLL VENOUS BLD VENIPUNCTURE: CPT

## 2023-03-03 PROCEDURE — 84439 ASSAY OF FREE THYROXINE: CPT

## 2023-03-03 PROCEDURE — 84480 ASSAY TRIIODOTHYRONINE (T3): CPT

## 2023-03-03 PROCEDURE — 84443 ASSAY THYROID STIM HORMONE: CPT

## 2023-03-03 PROCEDURE — 82306 VITAMIN D 25 HYDROXY: CPT

## 2023-03-03 PROCEDURE — 85025 COMPLETE CBC W/AUTO DIFF WBC: CPT

## 2023-03-03 PROCEDURE — 80053 COMPREHEN METABOLIC PANEL: CPT

## 2023-03-03 PROCEDURE — 80061 LIPID PANEL: CPT

## 2023-03-03 PROCEDURE — 83036 HEMOGLOBIN GLYCOSYLATED A1C: CPT

## 2023-03-07 ENCOUNTER — OFFICE VISIT (OUTPATIENT)
Dept: MEDICAL GROUP | Facility: LAB | Age: 53
End: 2023-03-07
Payer: COMMERCIAL

## 2023-03-07 VITALS
SYSTOLIC BLOOD PRESSURE: 110 MMHG | RESPIRATION RATE: 12 BRPM | TEMPERATURE: 97 F | WEIGHT: 212 LBS | HEIGHT: 65 IN | DIASTOLIC BLOOD PRESSURE: 68 MMHG | BODY MASS INDEX: 35.32 KG/M2 | OXYGEN SATURATION: 94 % | HEART RATE: 81 BPM

## 2023-03-07 DIAGNOSIS — F41.9 ANXIETY: ICD-10-CM

## 2023-03-07 DIAGNOSIS — F33.1 MODERATE EPISODE OF RECURRENT MAJOR DEPRESSIVE DISORDER (HCC): ICD-10-CM

## 2023-03-07 PROCEDURE — 99214 OFFICE O/P EST MOD 30 MIN: CPT | Performed by: FAMILY MEDICINE

## 2023-03-07 RX ORDER — FLUOXETINE HYDROCHLORIDE 40 MG/1
40 CAPSULE ORAL
Qty: 90 CAPSULE | Refills: 3 | Status: SHIPPED | OUTPATIENT
Start: 2023-03-07 | End: 2023-10-26 | Stop reason: SDUPTHER

## 2023-03-07 RX ORDER — LORAZEPAM 0.5 MG/1
0.5 TABLET ORAL EVERY 8 HOURS PRN
Qty: 30 TABLET | Refills: 0 | Status: SHIPPED | OUTPATIENT
Start: 2023-03-07 | End: 2023-09-05

## 2023-03-07 ASSESSMENT — PATIENT HEALTH QUESTIONNAIRE - PHQ9
SUM OF ALL RESPONSES TO PHQ QUESTIONS 1-9: 10
CLINICAL INTERPRETATION OF PHQ2 SCORE: 3
5. POOR APPETITE OR OVEREATING: 1 - SEVERAL DAYS

## 2023-03-07 ASSESSMENT — ANXIETY QUESTIONNAIRES
6. BECOMING EASILY ANNOYED OR IRRITABLE: NOT AT ALL
7. FEELING AFRAID AS IF SOMETHING AWFUL MIGHT HAPPEN: NEARLY EVERY DAY
3. WORRYING TOO MUCH ABOUT DIFFERENT THINGS: NEARLY EVERY DAY
5. BEING SO RESTLESS THAT IT IS HARD TO SIT STILL: NOT AT ALL
GAD7 TOTAL SCORE: 15
2. NOT BEING ABLE TO STOP OR CONTROL WORRYING: NEARLY EVERY DAY
4. TROUBLE RELAXING: NEARLY EVERY DAY
1. FEELING NERVOUS, ANXIOUS, OR ON EDGE: NEARLY EVERY DAY

## 2023-03-07 ASSESSMENT — FIBROSIS 4 INDEX: FIB4 SCORE: 1.14

## 2023-03-07 NOTE — PROGRESS NOTES
Subjective:     Chief Complaint   Patient presents with    Depression     family         HPI:   Gladis presents today with depression.     Both mom and dad are terminally ill. She is their primary care taker. Mom with uncontrolled diabetes, a-fib, end stage COPD. Dementia.   Dad with leukemia, CML, alzheimers as well.     Also having her own issues with foot surgery and nonhealing fracture, hardware cracked and loose. Needs bone stim now. Sees Dr Mason at [facility].     Work is stressful as well, Hs  , and filed complaint for hostile work place.     23 year old son is home helping, but also busy.     Meeting with HR to talk about FMLA this week.   Is seeing counselor as well.     When anxiety hits she freezes, shuts down. Cant function.       Current Outpatient Medications Ordered in Epic   Medication Sig Dispense Refill    FLUoxetine (PROZAC) 40 MG capsule Take 1 Capsule by mouth every day. 90 Capsule 3    LORazepam (ATIVAN) 0.5 MG Tab Take 1 Tablet by mouth every 8 hours as needed for Anxiety for up to 10 days. Indications: Feeling Anxious 30 Tablet 0    levothyroxine (SYNTHROID) 50 MCG Tab TAKE 1 TABLET BY MOUTH EVERY DAY IN THE MORNING ON AN EMPTY STOMACH 90 Tablet 0    buPROPion (WELLBUTRIN XL) 300 MG XL tablet TAKE 1 TABLET BY MOUTH EVERY DAY IN THE MORNING 90 Tablet 0    albuterol 108 (90 Base) MCG/ACT Aero Soln inhalation aerosol Inhale 2 Puffs every 6 hours as needed for Shortness of Breath. 8.5 g 0    Mirabegron ER (MYRBETRIQ) 25 MG TABLET SR 24 HR Myrbetriq 25 mg tablet,extended release   TAKE 1 TABLET BY MOUTH EVERY DAY       No current [facility]-ordered facility-administered medications on file.         ROS:  Gen: no fevers/chills, no changes in weight  Eyes: no changes in vision  ENT: no sore throat, no hearing loss, no bloody nose  Pulm: no sob, no cough  CV: no chest pain, no palpitations  GI: no nausea/vomiting, no diarrhea  : no dysuria  MSk: no myalgias  Skin: no rash  Neuro: no headaches,  "no numbness/tingling  Heme/Lymph: no easy bruising      Objective:     Exam:  /68 (BP Location: Left arm, Patient Position: Sitting, BP Cuff Size: Adult)   Pulse 81   Temp 36.1 °C (97 °F)   Resp 12   Ht 1.651 m (5' 5\")   Wt 96.2 kg (212 lb)   LMP  (LMP Unknown)   SpO2 94%   BMI 35.28 kg/m²  Body mass index is 35.28 kg/m².    Gen: AAOx3, NAD, well appearing  HEENT: NCAT, EOMI, Nares patent, Mucosa moist  Resp: Normal chest wall rise and fall, not SOB, no tachypnea  Skin: no rash or abnormality of visible skin.   Psych: normal speech, not slurred, good insight, affect sad tearful  MSK: Moves all four limbs equally and normally, gait normal      Assessment & Plan:     52 y.o. female with the following -     1. Moderate episode of recurrent major depressive disorder (HCC)  Discussed increasing fluoxetine. She is encouraged to reach out to her counselor/therapist as well.   - FLUoxetine (PROZAC) 40 MG capsule; Take 1 Capsule by mouth every day.  Dispense: 90 Capsule; Refill: 3    2. Anxiety  Discussed use of this medication as a rescue, not a daily. Reviewed risks and benefits, side effects, tolerance, dependence.   - LORazepam (ATIVAN) 0.5 MG Tab; Take 1 Tablet by mouth every 8 hours as needed for Anxiety for up to 10 days. Indications: Feeling Anxious  Dispense: 30 Tablet; Refill: 0            No follow-ups on file.    Please note that this dictation was created using voice recognition software. I have made every reasonable attempt to correct obvious errors, but I expect that there are errors of grammar and possibly content that I did not discover before finalizing the note.        "

## 2023-03-08 ENCOUNTER — PATIENT MESSAGE (OUTPATIENT)
Dept: MEDICAL GROUP | Facility: LAB | Age: 53
End: 2023-03-08
Payer: COMMERCIAL

## 2023-03-09 ENCOUNTER — APPOINTMENT (RX ONLY)
Dept: URBAN - METROPOLITAN AREA CLINIC 6 | Facility: CLINIC | Age: 53
Setting detail: DERMATOLOGY
End: 2023-03-09

## 2023-03-09 DIAGNOSIS — D49.2 NEOPLASM OF UNSPECIFIED BEHAVIOR OF BONE, SOFT TISSUE, AND SKIN: ICD-10-CM

## 2023-03-09 PROCEDURE — ? COUNSELING

## 2023-03-09 PROCEDURE — 99202 OFFICE O/P NEW SF 15 MIN: CPT

## 2023-03-09 PROCEDURE — ? DIAGNOSIS COMMENT

## 2023-03-09 ASSESSMENT — LOCATION ZONE DERM: LOCATION ZONE: ORAL_CAVITY

## 2023-03-09 ASSESSMENT — LOCATION SIMPLE DESCRIPTION DERM: LOCATION SIMPLE: RIGHT LATERAL TONGUE

## 2023-03-09 ASSESSMENT — LOCATION DETAILED DESCRIPTION DERM: LOCATION DETAILED: RIGHT MID-LATERAL TONGUE

## 2023-03-09 NOTE — PROCEDURE: DIAGNOSIS COMMENT
Comment: Lesion appeared 7 weeks ago, now resolved in last week.\\nWill NANCY w ENT if lesion returns.
Detail Level: Simple
Render Risk Assessment In Note?: no

## 2023-03-09 NOTE — HPI: SKIN LESIONS
Is This A New Presentation, Or A Follow-Up?: Skin Lesion
How Severe Is Your Skin Lesion?: mild
Have Your Skin Lesions Been Treated?: not been treated
Additional History: Patient was seen by a dentist who had mentioned lesion should be evaluated by a dermatologist if it persists. She states in the last week the lesion has greatly diminished.

## 2023-04-03 NOTE — ASSESSMENT & PLAN NOTE
Patient follows with Dr. Parmar, allergy specialist.  She is using albuterol inhaler as needed.  Patient states that she may use albuterol inhaler when she exposed to cold air.  Her asthma is stable and very well controlled currently.  
Patient is taking Wellbutrin 100 mg twice daily currently.  She completely wean off Prozac.  She increased her dose of Wellbutrin to 100 mg twice daily on 1/30/19.  She denies side effects from taking Wellbutrin.  She states that her depression is stable with current dose of Wellbutrin.  She reported having difficult time on switching Prozac to Wellbutrin, but she is doing better now with Wellbutrin.  She would like to stop taking Prozac due to risks of weight gain.  She is happy with her current condition.  She denied suicidal ideation or plan or homicidal ideation or plan.  
Patient states that she is taking vitamin D 2000 units daily regularly.  Her vitamin D level improved from 11 on 3/30/17 to 20 on 2/22/19.  She denies side effects from taking vitamin D.  We discussed to increase the dose of vitamin D to 5000 units daily.  She agreed with the plan.  
Patient tries to control her cholesterol with diet and physical exercise.  Her cholesterol level slightly improved.  She still has slightly elevated triglyceride.  She will try to control her triglyceride with diet and physical exercise.  I discussed blood test result with her in clinic today.    Results for SHILPI STRONG (MRN [id number]) as of 2/27/2019 18:11   Ref. Range 3/30/2017 11:10 2/22/2019 07:25   Cholesterol,Tot Latest Ref Range: 100 - 199 mg/dL 186 145   Triglycerides Latest Ref Range: 0 - 149 mg/dL 159 (H) 159 (H)   HDL Latest Ref Range: >=40 mg/dL 43 38 (A)   LDL Latest Ref Range: <100 mg/dL 111 (H) 75     
What Is The Reason For Today's Visit?: Full Body Skin Examination
How Severe Are Your Spot(S)?: moderate

## 2023-04-27 DIAGNOSIS — E03.9 ACQUIRED HYPOTHYROIDISM: ICD-10-CM

## 2023-04-27 DIAGNOSIS — F33.1 MODERATE EPISODE OF RECURRENT MAJOR DEPRESSIVE DISORDER (HCC): ICD-10-CM

## 2023-04-27 RX ORDER — LEVOTHYROXINE SODIUM 0.05 MG/1
TABLET ORAL
Qty: 90 TABLET | Refills: 0 | Status: SHIPPED | OUTPATIENT
Start: 2023-04-27 | End: 2023-07-13

## 2023-04-27 RX ORDER — BUPROPION HYDROCHLORIDE 300 MG/1
TABLET ORAL
Qty: 90 TABLET | Refills: 0 | Status: SHIPPED | OUTPATIENT
Start: 2023-04-27 | End: 2023-07-13

## 2023-04-27 NOTE — TELEPHONE ENCOUNTER
Received request via: Pharmacy    Was the patient seen in the last year in this department? Yes 03/07/23    Does the patient have an active prescription (recently filled or refills available) for medication(s) requested? No    Does the patient have long-term Plus and need 100 day supply (blood pressure, diabetes and cholesterol meds only)? Patient does not have SCP  LEVOTHYROXINE 50 MCG TABLET  BUPROPION HCL  MG TABLET

## 2023-06-18 ENCOUNTER — OFFICE VISIT (OUTPATIENT)
Dept: URGENT CARE | Facility: PHYSICIAN GROUP | Age: 53
End: 2023-06-18
Payer: COMMERCIAL

## 2023-06-18 VITALS
DIASTOLIC BLOOD PRESSURE: 70 MMHG | WEIGHT: 212 LBS | SYSTOLIC BLOOD PRESSURE: 110 MMHG | TEMPERATURE: 97 F | OXYGEN SATURATION: 97 % | HEART RATE: 87 BPM | BODY MASS INDEX: 35.32 KG/M2 | HEIGHT: 65 IN | RESPIRATION RATE: 16 BRPM

## 2023-06-18 DIAGNOSIS — S16.1XXA STRAIN OF NECK MUSCLE, INITIAL ENCOUNTER: ICD-10-CM

## 2023-06-18 DIAGNOSIS — R51.9 NONINTRACTABLE HEADACHE, UNSPECIFIED CHRONICITY PATTERN, UNSPECIFIED HEADACHE TYPE: ICD-10-CM

## 2023-06-18 PROBLEM — N39.3 FEMALE STRESS INCONTINENCE: Status: ACTIVE | Noted: 2023-03-30

## 2023-06-18 PROCEDURE — 3078F DIAST BP <80 MM HG: CPT | Performed by: NURSE PRACTITIONER

## 2023-06-18 PROCEDURE — 3074F SYST BP LT 130 MM HG: CPT | Performed by: NURSE PRACTITIONER

## 2023-06-18 PROCEDURE — 99214 OFFICE O/P EST MOD 30 MIN: CPT | Performed by: NURSE PRACTITIONER

## 2023-06-18 RX ORDER — CYCLOBENZAPRINE HCL 10 MG
10 TABLET ORAL EVERY 8 HOURS PRN
Qty: 30 TABLET | Refills: 0 | Status: SHIPPED | OUTPATIENT
Start: 2023-06-18

## 2023-06-18 RX ORDER — IBUPROFEN 800 MG/1
800 TABLET ORAL EVERY 8 HOURS PRN
Qty: 30 TABLET | Refills: 0 | Status: SHIPPED | OUTPATIENT
Start: 2023-06-18

## 2023-06-18 ASSESSMENT — ENCOUNTER SYMPTOMS
NECK PAIN: 1
WEAKNESS: 0
CONSTITUTIONAL NEGATIVE: 1
HEADACHES: 1
BACK PAIN: 1
SENSORY CHANGE: 0
GASTROINTESTINAL NEGATIVE: 1
FEVER: 0

## 2023-06-18 ASSESSMENT — VISUAL ACUITY: OU: 1

## 2023-06-18 ASSESSMENT — FIBROSIS 4 INDEX: FIB4 SCORE: 1.14

## 2023-06-18 NOTE — PROGRESS NOTES
Subjective:     Gladis Tovar is a 52 y.o. female who presents for Neck Strain (Pt was gardening x2 days when she felt neck become sore, neck pain that radiates down to back of left shoulder, headache )       Neck Pain   This is a new problem. The problem has been gradually worsening. Associated symptoms include headaches. Pertinent negatives include no fever or weakness.     Patient was working in the garden when she started to experience gradually worsening left-sided neck pain which spans to the left upper back.  Worsening.  Reports stiffness, decreased range of motion, and tenderness.  Also has a posterior headache.  Has not tried over the counter medication yet.  Comes in for evaluation first.    Review of Systems   Constitutional: Negative.  Negative for fever.   Gastrointestinal: Negative.    Genitourinary: Negative.    Musculoskeletal:  Positive for back pain and neck pain.   Neurological:  Positive for headaches. Negative for sensory change and weakness.   All other systems reviewed and are negative.    Refer to HPI for additional details.    During this visit, appropriate PPE was worn, and hand hygiene was performed.    PMH:  has a past medical history of Asthma (05/17/2019), Depression, Snoring, and Urinary incontinence (05/17/2019).    MEDS:   Current Outpatient Medications:     cyclobenzaprine (FLEXERIL) 10 mg Tab, Take 1 Tablet by mouth every 8 hours as needed for Muscle Spasms., Disp: 30 Tablet, Rfl: 0    ibuprofen (MOTRIN) 800 MG Tab, Take 1 Tablet by mouth every 8 hours as needed for Moderate Pain or Inflammation., Disp: 30 Tablet, Rfl: 0    phenazopyridine (PYRIDIUM) 200 MG Tab, TAKE 1 TABLET BY MOUTH THREE TIMES A DAY AS NEEDED FOR 3 DAYS, Disp: , Rfl:     levothyroxine (SYNTHROID) 50 MCG Tab, TAKE 1 TABLET BY MOUTH EVERY DAY IN THE MORNING ON AN EMPTY STOMACH, Disp: 90 Tablet, Rfl: 0    buPROPion (WELLBUTRIN XL) 300 MG XL tablet, TAKE 1 TABLET BY MOUTH EVERY DAY IN THE MORNING, Disp: 90 Tablet, Rfl:  0    FLUoxetine (PROZAC) 40 MG capsule, Take 1 Capsule by mouth every day., Disp: 90 Capsule, Rfl: 3    albuterol 108 (90 Base) MCG/ACT Aero Soln inhalation aerosol, Inhale 2 Puffs every 6 hours as needed for Shortness of Breath., Disp: 8.5 g, Rfl: 0    Mirabegron ER (MYRBETRIQ) 25 MG TABLET SR 24 HR, Myrbetriq 25 mg tablet,extended release  TAKE 1 TABLET BY MOUTH EVERY DAY, Disp: , Rfl:     ALLERGIES:   Allergies   Allergen Reactions    Codeine Unspecified     Heart stops  RXN=age 3     SURGHX:   Past Surgical History:   Procedure Laterality Date    PB FUSION BIG TOE,MT-P JT Right 11/21/2022    Procedure: RIGHT FOOT HALLUX METATARSALPHALANGEAL JOINT FUSION;  Surgeon: Chad Mason M.D.;  Location: [facility];  Service: Orthopedics    PB REMV BONE FOR GRAFT MAJOR Right 11/21/2022    Procedure: RIGHT CALCANEAL AUTOGRAFT;  Surgeon: Chad Mason M.D.;  Location: [facility];  Service: Orthopedics    NE PELVIC EXAMINATION W ANESTH N/A 6/24/2019    Procedure: EXAM UNDER ANESTHESIA, PELVIS;  Surgeon: Edwina Miranda M.D.;  Location: SURGERY SAME DAY [facility];  Service: Gynecology    NE LAP,DIAGNOSTIC ABDOMEN  6/24/2019    Procedure: PELVISCOPY;  Surgeon: Edwina Miranda M.D.;  Location: SURGERY SAME DAY [facility];  Service: Gynecology    CYSTOSCOPY  6/24/2019    Procedure: CYSTOSCOPY;  Surgeon: Edwina Miranda M.D.;  Location: SURGERY SAME DAY [facility];  Service: Gynecology    VAGINAL HYSTERECTOMY SCOPE TOTAL  6/24/2019    Procedure: HYSTERECTOMY, TOTAL, VAGINAL, LAPAROSCOPY-ASSISTED;  Surgeon: Edwina Miranda M.D.;  Location: SURGERY SAME DAY [facility];  Service: Gynecology    NE PELVIC EXAMINATION W ANESTH  5/23/2019    Procedure: EXAM UNDER ANESTHESIA, PELVIS;  Surgeon: Edwina Miranda M.D.;  Location: SURGERY SAME DAY [facility];  Service: Gynecology    NE LAP,DIAGNOSTIC ABDOMEN  5/23/2019    Procedure:  "PELVISCOPY;  Surgeon: Edwina Miranda M.D.;  Location: SURGERY SAME DAY [facility];  Service: Gynecology    NC HYSTEROSCOPY,DX,SEP PROC  5/23/2019    Procedure: HYSTEROSCOPY, DIAGNOSTIC- AND EXCISION OF POLY/FIBROID AND ENDOMETRIAL CURETTAGE;  Surgeon: Edwina Miranda M.D.;  Location: SURGERY SAME DAY [facility];  Service: Gynecology    HYSTEROSCOPY WITH VIDEO OPERATIVE  5/23/2019    Procedure: HYSTEROSCOPY, WITH VIDEO IMAGING;  Surgeon: Edwina Miranda M.D.;  Location: SURGERY SAME DAY [facility];  Service: Gynecology    HYSTEROSCOPY WITH MYOSURE  5/23/2019    Procedure: HYSTEROSCOPY, WITH TISSUE REMOVAL, USING HYSTEROSCOPIC ROTATING CUTTER BLADE;  Surgeon: Edwina Miranda M.D.;  Location: SURGERY SAME DAY [facility];  Service: Gynecology    SALPINGECTOMY Bilateral 5/23/2019    Procedure: SALPINGECTOMY;  Surgeon: Edwina Miranda M.D.;  Location: SURGERY SAME DAY [facility];  Service: Gynecology    SHOULDER SURGERY Bilateral 2012    APPENDECTOMY  1994    BREAST RECONSTRUCTION  1992    REDUCTION    CERVICAL CERCLAGE  1994/1996/1999    x 3    TONSILLECTOMY       SOCHX:  reports that she has never smoked. She has never used smokeless tobacco. She reports that she does not currently use alcohol. She reports that she does not use drugs.    FH: Per HPI as applicable/pertinent.      Objective:     /70 (BP Location: Right arm, Patient Position: Sitting, BP Cuff Size: Adult)   Pulse 87   Temp 36.1 °C (97 °F) (Temporal)   Resp 16   Ht 1.651 m (5' 5\")   Wt 96.2 kg (212 lb)   LMP  (LMP Unknown)   SpO2 97%   BMI 35.28 kg/m²     Physical Exam  Nursing note reviewed.   Constitutional:       General: She is not in acute distress.     Appearance: She is well-developed. She is not ill-appearing or toxic-appearing.   Eyes:      General: Vision grossly intact.   Neck:      Trachea: Phonation normal.   Cardiovascular:      Rate and Rhythm: Normal rate.   Pulmonary:      " Effort: Pulmonary effort is normal. No respiratory distress.   Musculoskeletal:         General: No deformity.      Left shoulder: No swelling, deformity or tenderness. Normal range of motion. Normal strength.      Cervical back: Spasms (Left trapezius) and tenderness present. No swelling, deformity or bony tenderness. Muscular tenderness (Left) present. No spinous process tenderness. Decreased range of motion (Rotation, due to pain).      Thoracic back: No deformity or tenderness. Normal range of motion.   Skin:     General: Skin is warm and dry.      Coloration: Skin is not pale.   Neurological:      Mental Status: She is alert and oriented to person, place, and time.      GCS: GCS eye subscore is 4. GCS verbal subscore is 5. GCS motor subscore is 6.      Motor: No weakness.   Psychiatric:         Mood and Affect: Mood normal.         Behavior: Behavior normal. Behavior is cooperative.         Thought Content: Thought content normal.         Judgment: Judgment normal.       Assessment/Plan:     1. Strain of neck muscle, initial encounter  - cyclobenzaprine (FLEXERIL) 10 mg Tab; Take 1 Tablet by mouth every 8 hours as needed for Muscle Spasms.  Dispense: 30 Tablet; Refill: 0  - ibuprofen (MOTRIN) 800 MG Tab; Take 1 Tablet by mouth every 8 hours as needed for Moderate Pain or Inflammation.  Dispense: 30 Tablet; Refill: 0    2. Nonintractable headache, unspecified chronicity pattern, unspecified headache type  - ibuprofen (MOTRIN) 800 MG Tab; Take 1 Tablet by mouth every 8 hours as needed for Moderate Pain or Inflammation.  Dispense: 30 Tablet; Refill: 0    Rest. Muscle strain/spasm likely causing headache. May apply heat up to 20 minutes at a time. May use gentle massage, stretching, and range of motion exercises as tolerated as symptoms improve. Rx as above sent electronically. Caution drowsiness with Flexeril. May use over-the-counter acetaminophen and topicals, per 's instructions, for additional pain  relief.    Differential diagnosis, natural history, supportive care, over-the-counter symptom management per 's instructions, close monitoring, and indications for immediate follow-up discussed.     All questions answered. Patient agrees with the plan of care.    Discharge summary provided via [employer].    Billing note: 30 minutes was allotted and spent for patient care and coordination of care (not reported separately) including preparing for the visit, obtaining/reviewing history from/with patient, performing an exam/evaluation, ordering Rx, developing a plan of care, counseling/educating the patient, developing the discharge summary for release to [employer], and documentation. This template was updated to summarize care specific to this encounter. Established patient. [id number]. Please refer to the chart for additional details on the care provided.

## 2023-07-13 DIAGNOSIS — F33.1 MODERATE EPISODE OF RECURRENT MAJOR DEPRESSIVE DISORDER (HCC): ICD-10-CM

## 2023-07-13 DIAGNOSIS — E03.9 ACQUIRED HYPOTHYROIDISM: ICD-10-CM

## 2023-07-13 RX ORDER — LEVOTHYROXINE SODIUM 0.05 MG/1
TABLET ORAL
Qty: 90 TABLET | Refills: 0 | Status: SHIPPED | OUTPATIENT
Start: 2023-07-13 | End: 2023-10-26 | Stop reason: SDUPTHER

## 2023-07-13 RX ORDER — BUPROPION HYDROCHLORIDE 300 MG/1
TABLET ORAL
Qty: 90 TABLET | Refills: 0 | Status: SHIPPED | OUTPATIENT
Start: 2023-07-13 | End: 2023-10-26 | Stop reason: SDUPTHER

## 2023-07-13 NOTE — TELEPHONE ENCOUNTER
Received request via: Pharmacy    Was the patient seen in the last year in this department? Yes  LOV:3/7/2023  Does the patient have an active prescription (recently filled or refills available) for medication(s) requested? No    Does the patient have [facility] and need 100 day supply (blood pressure, diabetes and cholesterol meds only)? Patient does not have SCP

## 2023-07-24 DIAGNOSIS — F33.1 MODERATE EPISODE OF RECURRENT MAJOR DEPRESSIVE DISORDER (HCC): ICD-10-CM

## 2023-07-24 RX ORDER — FLUOXETINE HYDROCHLORIDE 20 MG/1
20 CAPSULE ORAL
Qty: 90 CAPSULE | Refills: 1 | Status: SHIPPED | OUTPATIENT
Start: 2023-07-24 | End: 2023-10-26

## 2023-07-24 NOTE — TELEPHONE ENCOUNTER
Received request via: Pharmacy  3/7/23lov  Was the patient seen in the last year in this department? Yes    Does the patient have an active prescription (recently filled or refills available) for medication(s) requested? No    Does the patient have [facility] and need 100 day supply (blood pressure, diabetes and cholesterol meds only)? Patient does not have SCP

## 2023-09-02 DIAGNOSIS — F41.9 ANXIETY: ICD-10-CM

## 2023-09-05 RX ORDER — LORAZEPAM 0.5 MG/1
0.5 TABLET ORAL EVERY 8 HOURS PRN
Qty: 30 TABLET | Refills: 0 | Status: SHIPPED | OUTPATIENT
Start: 2023-09-05 | End: 2023-10-26 | Stop reason: SDUPTHER

## 2023-09-05 NOTE — TELEPHONE ENCOUNTER
Received request via: Pharmacy    Was the patient seen in the last year in this department? Yes  3/7/23  Does the patient have an active prescription (recently filled or refills available) for medication(s) requested? No    Does the patient have nursing home Plus and need 100 day supply (blood pressure, diabetes and cholesterol meds only)? Medication is not for cholesterol, blood pressure or diabetes

## 2023-10-20 ENCOUNTER — HOSPITAL ENCOUNTER (OUTPATIENT)
Dept: RADIOLOGY | Facility: MEDICAL CENTER | Age: 53
End: 2023-10-20
Attending: FAMILY MEDICINE
Payer: COMMERCIAL

## 2023-10-20 DIAGNOSIS — Z12.31 VISIT FOR SCREENING MAMMOGRAM: ICD-10-CM

## 2023-10-20 PROCEDURE — 77063 BREAST TOMOSYNTHESIS BI: CPT

## 2023-10-26 ENCOUNTER — TELEMEDICINE (OUTPATIENT)
Dept: MEDICAL GROUP | Facility: LAB | Age: 53
End: 2023-10-26
Payer: COMMERCIAL

## 2023-10-26 DIAGNOSIS — E03.9 ACQUIRED HYPOTHYROIDISM: ICD-10-CM

## 2023-10-26 DIAGNOSIS — F41.9 ANXIETY: ICD-10-CM

## 2023-10-26 DIAGNOSIS — F33.1 MODERATE EPISODE OF RECURRENT MAJOR DEPRESSIVE DISORDER (HCC): ICD-10-CM

## 2023-10-26 PROCEDURE — 99214 OFFICE O/P EST MOD 30 MIN: CPT | Mod: 95 | Performed by: FAMILY MEDICINE

## 2023-10-26 RX ORDER — BUPROPION HYDROCHLORIDE 300 MG/1
300 TABLET ORAL EVERY MORNING
Qty: 90 TABLET | Refills: 3 | Status: SHIPPED | OUTPATIENT
Start: 2023-10-26

## 2023-10-26 RX ORDER — LORAZEPAM 0.5 MG/1
0.5 TABLET ORAL EVERY 8 HOURS PRN
Qty: 30 TABLET | Refills: 0 | Status: SHIPPED | OUTPATIENT
Start: 2023-10-26 | End: 2023-11-05

## 2023-10-26 RX ORDER — FLUOXETINE HYDROCHLORIDE 40 MG/1
40 CAPSULE ORAL
Qty: 90 CAPSULE | Refills: 3 | Status: SHIPPED | OUTPATIENT
Start: 2023-10-26

## 2023-10-26 RX ORDER — LEVOTHYROXINE SODIUM 0.05 MG/1
50 TABLET ORAL
Qty: 90 TABLET | Refills: 3 | Status: SHIPPED | OUTPATIENT
Start: 2023-10-26

## 2023-10-26 NOTE — PROGRESS NOTES
Virtual Visit: Established Patient   This visit was conducted via Zoom using secure and encrypted videoconferencing technology.   The patient was in a private location outside of their home in the state of [place].    The patient's identity was confirmed and verbal consent was obtained for this virtual visit.     Subjective:   CC:   Chief Complaint   Patient presents with    Medication Refill       Gladis Tovar is a 52 y.o. female presenting for evaluation and management of:    She is still struggling with grief if her dad passing.   Back at work though. Seeing pain management for neck issues, ablation and a medial branch block. Sees [facility]. Headaches and neck pains.     Sleeping well. Flexeril helps sleep.   Work is better, helps keep her going.   Sees urology for interstitial cystitis. Triggered by stress and tomatoes.     Overall she is feeling more level, no dark days.     Has lost some weight as well.     ROS   See above.         Current medicines (including changes today)  Current Outpatient Medications   Medication Sig Dispense Refill    pentosan (ELMIRON) 100 MG Cap Take 1 Capsule by mouth 3 times a day.      buPROPion (WELLBUTRIN XL) 300 MG XL tablet TAKE 1 TABLET BY MOUTH EVERY DAY IN THE MORNING 90 Tablet 0    levothyroxine (SYNTHROID) 50 MCG Tab TAKE 1 TABLET BY MOUTH EVERY DAY IN THE MORNING ON AN EMPTY STOMACH 90 Tablet 0    cyclobenzaprine (FLEXERIL) 10 mg Tab Take 1 Tablet by mouth every 8 hours as needed for Muscle Spasms. 30 Tablet 0    ibuprofen (MOTRIN) 800 MG Tab Take 1 Tablet by mouth every 8 hours as needed for Moderate Pain or Inflammation. 30 Tablet 0    phenazopyridine (PYRIDIUM) 200 MG Tab TAKE 1 TABLET BY MOUTH THREE TIMES A DAY AS NEEDED FOR 3 DAYS      FLUoxetine (PROZAC) 40 MG capsule Take 1 Capsule by mouth every day. 90 Capsule 3    albuterol 108 (90 Base) MCG/ACT Aero Soln inhalation aerosol Inhale 2 Puffs every 6 hours as needed for Shortness of Breath. 8.5 g 0     Mirabegron ER (MYRBETRIQ) 25 MG TABLET SR 24 HR Myrbetriq 25 mg tablet,extended release   TAKE 1 TABLET BY MOUTH EVERY DAY       No current facility-administered medications for this visit.       Patient Active Problem List    Diagnosis Date Noted    Female stress incontinence 03/30/2023    Right foot pain 11/01/2022    Acquired hypothyroidism 07/15/2021    Dyslipidemia 07/15/2021    Primary insomnia 02/11/2021    S/P hysterectomy 03/02/2020    Prediabetes 03/02/2020    Elevated alkaline phosphatase level 11/27/2019    [facility]  08/27/2019    Episode of recurrent major depressive disorder (HCC) 12/26/2018    Mild intermittent asthma without complication 12/26/2018    Class 2 obesity without serious comorbidity with body mass index (BMI) of 38.0 to 38.9 in adult 03/12/2018        Objective:   LMP  (LMP Unknown)     Physical Exam:  Constitutional: Alert, no distress, well-groomed.  Skin: No rashes in visible areas.  Eye: Round. Conjunctiva clear, lids normal. No icterus.   ENMT: Lips pink without lesions, good dentition, moist mucous membranes. Phonation normal.  Neck: No masses, no thyromegaly. Moves freely without pain.  Respiratory: Unlabored respiratory effort, no cough or audible wheeze  Psych: Alert and oriented x3, normal affect and mood.     Assessment and Plan:   The following treatment plan was discussed:     1. Moderate episode of recurrent major depressive disorder (HCC)  Chronic, stable.  She is doing fairly well.  Reports feeling a lot more level.  Ultimately she would like to get off the fluoxetine but she feels that she is not ready.  Her father passed earlier this year hence if she still struggling with this a bit.  - buPROPion (WELLBUTRIN XL) 300 MG XL tablet; Take 1 Tablet by mouth every morning.  Dispense: 90 Tablet; Refill: 3  - fluoxetine (PROZAC) 40 MG capsule; Take 1 Capsule by mouth every day.  Dispense: 90 Capsule; Refill: 3    2. Acquired  hypothyroidism  Chronic, stable.  Last check in March.  We will check again at her annual in March.  - levothyroxine (SYNTHROID) 50 MCG Tab; Take 1 Tablet by mouth every morning on an empty stomach.  Dispense: 90 Tablet; Refill: 3    3. Anxiety  Very sporadic use.  PDMP reviewed.  No concerns.  - LORazepam (ATIVAN) 0.5 MG Tab; Take 1 Tablet by mouth every 8 hours as needed for Anxiety for up to 10 days. Indications: Feeling Anxious  Dispense: 30 Tablet; Refill: 0    Follow-up: No follow-ups on file.

## 2024-06-24 ENCOUNTER — OFFICE VISIT (OUTPATIENT)
Dept: MEDICAL GROUP | Facility: LAB | Age: 54
End: 2024-06-24
Payer: COMMERCIAL

## 2024-06-24 VITALS
WEIGHT: 208 LBS | OXYGEN SATURATION: 98 % | BODY MASS INDEX: 34.66 KG/M2 | HEART RATE: 88 BPM | HEIGHT: 65 IN | SYSTOLIC BLOOD PRESSURE: 120 MMHG | RESPIRATION RATE: 16 BRPM | DIASTOLIC BLOOD PRESSURE: 78 MMHG | TEMPERATURE: 97.3 F

## 2024-06-24 DIAGNOSIS — E55.9 VITAMIN D INSUFFICIENCY: ICD-10-CM

## 2024-06-24 DIAGNOSIS — Z79.890 HORMONE REPLACEMENT THERAPY (HRT): ICD-10-CM

## 2024-06-24 DIAGNOSIS — R11.2 NAUSEA AND VOMITING, UNSPECIFIED VOMITING TYPE: ICD-10-CM

## 2024-06-24 DIAGNOSIS — E78.5 DYSLIPIDEMIA: ICD-10-CM

## 2024-06-24 DIAGNOSIS — E03.9 ACQUIRED HYPOTHYROIDISM: ICD-10-CM

## 2024-06-24 DIAGNOSIS — E55.9 VITAMIN D DEFICIENCY: ICD-10-CM

## 2024-06-24 DIAGNOSIS — Z78.0 MENOPAUSE: ICD-10-CM

## 2024-06-24 PROBLEM — M77.8 EXTENSOR CARPI ULNARIS TENDINITIS: Status: ACTIVE | Noted: 2024-01-28

## 2024-06-24 PROCEDURE — 3074F SYST BP LT 130 MM HG: CPT | Performed by: FAMILY MEDICINE

## 2024-06-24 PROCEDURE — 3078F DIAST BP <80 MM HG: CPT | Performed by: FAMILY MEDICINE

## 2024-06-24 PROCEDURE — 99214 OFFICE O/P EST MOD 30 MIN: CPT | Performed by: FAMILY MEDICINE

## 2024-06-24 RX ORDER — ESTRADIOL 0.05 MG/D
1 PATCH TRANSDERMAL
Qty: 12 PATCH | Refills: 1 | Status: SHIPPED | OUTPATIENT
Start: 2024-06-24 | End: 2024-06-24

## 2024-06-24 RX ORDER — ESTRADIOL 0.05 MG/D
1 PATCH TRANSDERMAL
Qty: 12 PATCH | Refills: 1 | Status: SHIPPED
Start: 2024-06-24

## 2024-06-24 RX ORDER — PROGESTERONE 100 MG/1
100 CAPSULE ORAL NIGHTLY
Qty: 90 CAPSULE | Refills: 1 | Status: SHIPPED
Start: 2024-06-24

## 2024-06-24 RX ORDER — ONDANSETRON 4 MG/1
TABLET, ORALLY DISINTEGRATING ORAL
COMMUNITY
Start: 2024-06-19

## 2024-06-24 RX ORDER — PROGESTERONE 100 MG/1
100 CAPSULE ORAL NIGHTLY
Qty: 90 CAPSULE | Refills: 1 | Status: SHIPPED | OUTPATIENT
Start: 2024-06-24 | End: 2024-06-24

## 2024-06-24 RX ORDER — PROMETHAZINE HYDROCHLORIDE 12.5 MG/1
TABLET ORAL
COMMUNITY
Start: 2024-06-21

## 2024-06-24 ASSESSMENT — FIBROSIS 4 INDEX: FIB4 SCORE: 1.16

## 2024-06-24 NOTE — PROGRESS NOTES
Subjective:     Chief Complaint   Patient presents with    Nausea     X3 days ago     Emesis     X3 days ago     Medication Management         HPI:   Gladis presents today to discuss medications. Was ill over the weekend. Had a mobile IV service come to her home.       Lots of stressors in life with end of school year. Nausea actually started a week ago, and then Friday nausea and vomiting started.     Zofran and phenergen prescribed to her from the mobile service.     She does need some blood work. Felt better after fluids. Appetite still limited.     No suspicion for food borne illness.     No abdominal pain. No diarrhea.     Lmp: unknown, hysterectomy 2019, retains ovaries. Does report feeling overheated at times, sweaty, but not a real hot flash. Knows her sleep is poor in general, she wakes a lot.         Current Outpatient Medications Ordered in Epic   Medication Sig Dispense Refill    ondansetron (ZOFRAN ODT) 4 MG TABLET DISPERSIBLE DISSOLVE 1 TABLET ON TOP OF TONGUE 3 TIMES DAILY AS NEEDED FOR NAUSEA (MAX 1.6 TABS/DAY PER INS)      promethazine (PHENERGAN) 12.5 MG tablet 1 TAB BY MOUTH EVERY 6 HOURS OR AS NEEDED FOR NAUSEA/VOMITING      buPROPion (WELLBUTRIN XL) 300 MG XL tablet Take 1 Tablet by mouth every morning. 90 Tablet 3    levothyroxine (SYNTHROID) 50 MCG Tab Take 1 Tablet by mouth every morning on an empty stomach. 90 Tablet 3    fluoxetine (PROZAC) 40 MG capsule Take 1 Capsule by mouth every day. 90 Capsule 3    phenazopyridine (PYRIDIUM) 200 MG Tab TAKE 1 TABLET BY MOUTH THREE TIMES A DAY AS NEEDED FOR 3 DAYS      albuterol 108 (90 Base) MCG/ACT Aero Soln inhalation aerosol Inhale 2 Puffs every 6 hours as needed for Shortness of Breath. 8.5 g 0    Mirabegron ER (MYRBETRIQ) 25 MG TABLET SR 24 HR Myrbetriq 25 mg tablet,extended release   TAKE 1 TABLET BY MOUTH EVERY DAY       No current [facility]-ordered facility-administered medications on file.         ROS:  Gen: no fevers/chills, no changes in  "weight  Eyes: no changes in vision  ENT: no sore throat, no hearing loss, no bloody nose  Pulm: no sob, no cough  CV: no chest pain, no palpitations  MSk: no myalgias  Skin: no rash  Neuro: no headaches, no numbness/tingling  Heme/Lymph: no easy bruising      Objective:     Exam:  /78   Pulse 88   Temp 36.3 °C (97.3 °F)   Resp 16   Ht 1.651 m (5' 5\")   Wt 94.3 kg (208 lb)   LMP  (LMP Unknown)   SpO2 98%   BMI 34.61 kg/m²  Body mass index is 34.61 kg/m².    Gen: Alert and oriented, No apparent distress.  Neck: Neck is supple without lymphadenopathy.  Lungs: Normal effort, CTA bilaterally, no wheezes, rhonchi, or rales  CV: Regular rate and rhythm. No murmurs, rubs, or gallops.  Ext: No clubbing, cyanosis, edema.      Assessment & Plan:     53 y.o. female with the following -     1. Acquired hypothyroidism  Has not been checked in quite some time.  Will get this rechecked.  - TSH WITH REFLEX TO FT4; Future    2. Dyslipidemia    - Lipid Profile; Future    3. Vitamin D insufficiency      4. Nausea and vomiting, unspecified vomiting type  Discussed trying to avoid services like home IV therapy given that they are not checking any labs.  If she has a very low sodium because she has been vomiting or something else is going on and replenish her with fluids that could make things worse.  Discussed getting real medical care if needed in situations like this.  Will go and get a recheck of her electrolytes to make sure Bitting is looking okay.  - CBC WITH DIFFERENTIAL; Future  - Comp Metabolic Panel; Future  - HEMOGLOBIN A1C; Future    5. Vitamin D deficiency    - VITAMIN D,25 HYDROXY (DEFICIENCY); Future    6. Menopause  Discussed checking her ovarian reserve.  Based on age and some of her symptoms I think she is probably postmenopausal at this time.  We do not have to wait necessarily for this lab to come back to start HRT.  - ANTI-MULLERIAN HORMONE([facility]); Future    7. Hormone replacement therapy (HRT)  Discussed " menopause in general.  Discussed all of the receptors that estrogen and progesterone have Afaxin.  Discussed the multitude of symptoms that can be related to menopause much as hot flashes and night sweats.  Discussed long-term benefits with heart disease reduction dementia reduction, osteoporosis reduction.  Discussed also other factors that are necessary and working on reduction of risk for all of these things.  We did discuss exercise as well as diet and sleep hygiene.  Discussed stress management as well.  Will go and get her started on some hormone replacement therapy.  Discussed adjusting based on symptoms not labs.  Discussed risk and benefits possible side effects.  - estradiol (CLIMARA) 0.05 MG/24HR PATCH WEEKLY; Place 1 Patch on the skin every 7 days.  Dispense: 12 Patch; Refill: 1  - progesterone (PROMETRIUM) 100 MG Cap; Take 1 Capsule by mouth every evening.  Dispense: 90 Capsule; Refill: 1            No follow-ups on file.    Please note that this dictation was created using voice recognition software. I have made every reasonable attempt to correct obvious errors, but I expect that there are errors of grammar and possibly content that I did not discover before finalizing the note.

## 2024-06-26 ENCOUNTER — HOSPITAL ENCOUNTER (OUTPATIENT)
Dept: LAB | Facility: MEDICAL CENTER | Age: 54
End: 2024-06-26
Attending: FAMILY MEDICINE
Payer: COMMERCIAL

## 2024-06-26 DIAGNOSIS — R11.2 NAUSEA AND VOMITING, UNSPECIFIED VOMITING TYPE: ICD-10-CM

## 2024-06-26 LAB
BASOPHILS # BLD AUTO: 0.9 % (ref 0–1.8)
BASOPHILS # BLD: 0.06 K/UL (ref 0–0.12)
EOSINOPHIL # BLD AUTO: 0.11 K/UL (ref 0–0.51)
EOSINOPHIL NFR BLD: 1.6 % (ref 0–6.9)
ERYTHROCYTE [DISTWIDTH] IN BLOOD BY AUTOMATED COUNT: 43.8 FL (ref 35.9–50)
EST. AVERAGE GLUCOSE BLD GHB EST-MCNC: 114 MG/DL
HBA1C MFR BLD: 5.6 % (ref 4–5.6)
HCT VFR BLD AUTO: 43.1 % (ref 37–47)
HGB BLD-MCNC: 13.8 G/DL (ref 12–16)
IMM GRANULOCYTES # BLD AUTO: 0.02 K/UL (ref 0–0.11)
IMM GRANULOCYTES NFR BLD AUTO: 0.3 % (ref 0–0.9)
LYMPHOCYTES # BLD AUTO: 2.6 K/UL (ref 1–4.8)
LYMPHOCYTES NFR BLD: 38.3 % (ref 22–41)
MCH RBC QN AUTO: 27 PG (ref 27–33)
MCHC RBC AUTO-ENTMCNC: 32 G/DL (ref 32.2–35.5)
MCV RBC AUTO: 84.3 FL (ref 81.4–97.8)
MONOCYTES # BLD AUTO: 0.59 K/UL (ref 0–0.85)
MONOCYTES NFR BLD AUTO: 8.7 % (ref 0–13.4)
NEUTROPHILS # BLD AUTO: 3.41 K/UL (ref 1.82–7.42)
NEUTROPHILS NFR BLD: 50.2 % (ref 44–72)
NRBC # BLD AUTO: 0 K/UL
NRBC BLD-RTO: 0 /100 WBC (ref 0–0.2)
PLATELET # BLD AUTO: 248 K/UL (ref 164–446)
PMV BLD AUTO: 9.7 FL (ref 9–12.9)
RBC # BLD AUTO: 5.11 M/UL (ref 4.2–5.4)
WBC # BLD AUTO: 6.8 K/UL (ref 4.8–10.8)

## 2024-06-26 PROCEDURE — 36415 COLL VENOUS BLD VENIPUNCTURE: CPT

## 2024-06-26 PROCEDURE — 82166 ASSAY ANTI-MULLERIAN HORM: CPT

## 2024-06-26 PROCEDURE — 83036 HEMOGLOBIN GLYCOSYLATED A1C: CPT

## 2024-06-26 PROCEDURE — 82306 VITAMIN D 25 HYDROXY: CPT

## 2024-06-26 PROCEDURE — 84443 ASSAY THYROID STIM HORMONE: CPT

## 2024-06-26 PROCEDURE — 85025 COMPLETE CBC W/AUTO DIFF WBC: CPT

## 2024-06-26 PROCEDURE — 80053 COMPREHEN METABOLIC PANEL: CPT

## 2024-06-26 PROCEDURE — 80061 LIPID PANEL: CPT

## 2024-06-27 LAB
25(OH)D3 SERPL-MCNC: 45 NG/ML (ref 30–100)
ALBUMIN SERPL BCP-MCNC: 4.4 G/DL (ref 3.2–4.9)
ALBUMIN/GLOB SERPL: 1.8 G/DL
ALP SERPL-CCNC: 127 U/L (ref 30–99)
ALT SERPL-CCNC: 9 U/L (ref 2–50)
ANION GAP SERPL CALC-SCNC: 14 MMOL/L (ref 7–16)
AST SERPL-CCNC: 18 U/L (ref 12–45)
BILIRUB SERPL-MCNC: 0.5 MG/DL (ref 0.1–1.5)
BUN SERPL-MCNC: 23 MG/DL (ref 8–22)
CALCIUM ALBUM COR SERPL-MCNC: 8.5 MG/DL (ref 8.5–10.5)
CALCIUM SERPL-MCNC: 8.8 MG/DL (ref 8.5–10.5)
CHLORIDE SERPL-SCNC: 105 MMOL/L (ref 96–112)
CHOLEST SERPL-MCNC: 204 MG/DL (ref 100–199)
CO2 SERPL-SCNC: 21 MMOL/L (ref 20–33)
CREAT SERPL-MCNC: 0.85 MG/DL (ref 0.5–1.4)
FASTING STATUS PATIENT QL REPORTED: NORMAL
GFR SERPLBLD CREATININE-BSD FMLA CKD-EPI: 81 ML/MIN/1.73 M 2
GLOBULIN SER CALC-MCNC: 2.4 G/DL (ref 1.9–3.5)
GLUCOSE SERPL-MCNC: 88 MG/DL (ref 65–99)
HDLC SERPL-MCNC: 61 MG/DL
LDLC SERPL CALC-MCNC: 117 MG/DL
POTASSIUM SERPL-SCNC: 4.2 MMOL/L (ref 3.6–5.5)
PROT SERPL-MCNC: 6.8 G/DL (ref 6–8.2)
SODIUM SERPL-SCNC: 140 MMOL/L (ref 135–145)
TRIGL SERPL-MCNC: 130 MG/DL (ref 0–149)
TSH SERPL DL<=0.005 MIU/L-ACNC: 0.76 UIU/ML (ref 0.38–5.33)

## 2024-06-30 LAB — MIS SERPL-MCNC: <0.003 NG/ML

## 2024-09-13 DIAGNOSIS — Z79.890 HORMONE REPLACEMENT THERAPY (HRT): ICD-10-CM

## 2024-09-13 RX ORDER — ESTRADIOL 0.05 MG/D
1 PATCH TRANSDERMAL
Qty: 12 PATCH | Refills: 1 | Status: SHIPPED | OUTPATIENT
Start: 2024-09-13

## 2024-09-13 NOTE — TELEPHONE ENCOUNTER
Received request via: Pharmacy    Was the patient seen in the last year in this department? Yes    Does the patient have an active prescription (recently filled or refills available) for medication(s) requested? No    Pharmacy Name: [facility] [address]     Does the patient have [facility] Plus and need 100-day supply? (This applies to ALL medications) Patient does not have SCP

## 2024-09-19 ENCOUNTER — PATIENT MESSAGE (OUTPATIENT)
Dept: MEDICAL GROUP | Facility: LAB | Age: 54
End: 2024-09-19
Payer: COMMERCIAL

## 2024-09-19 DIAGNOSIS — F33.1 MODERATE EPISODE OF RECURRENT MAJOR DEPRESSIVE DISORDER (HCC): ICD-10-CM

## 2024-09-19 DIAGNOSIS — E03.9 ACQUIRED HYPOTHYROIDISM: ICD-10-CM

## 2024-09-19 NOTE — PATIENT COMMUNICATION
Received request via: Patient    Was the patient seen in the last year in this department? Yes    Does the patient have an active prescription (recently filled or refills available) for medication(s) requested? No    Pharmacy Name:   [facility]/pharmacy #[id number] - [place] - [address]  [address]  [place] [address]  Phone: [phone] Fax: [phone]       Does the patient have [facility] Plus and need 100-day supply? (This applies to ALL medications) Patient does not have SCP

## 2024-09-20 RX ORDER — LEVOTHYROXINE SODIUM 50 UG/1
50 TABLET ORAL
Qty: 90 TABLET | Refills: 3 | Status: SHIPPED | OUTPATIENT
Start: 2024-09-20

## 2024-09-20 RX ORDER — BUPROPION HYDROCHLORIDE 300 MG/1
300 TABLET ORAL EVERY MORNING
Qty: 90 TABLET | Refills: 3 | Status: SHIPPED | OUTPATIENT
Start: 2024-09-20

## 2024-11-30 DIAGNOSIS — F33.1 MODERATE EPISODE OF RECURRENT MAJOR DEPRESSIVE DISORDER (HCC): ICD-10-CM

## 2024-11-30 DIAGNOSIS — Z79.890 HORMONE REPLACEMENT THERAPY (HRT): ICD-10-CM

## 2024-12-02 RX ORDER — PROGESTERONE 100 MG/1
100 CAPSULE ORAL EVERY EVENING
Qty: 90 CAPSULE | Refills: 1 | Status: SHIPPED | OUTPATIENT
Start: 2024-12-02

## 2024-12-02 RX ORDER — FLUOXETINE 40 MG/1
40 CAPSULE ORAL
Qty: 90 CAPSULE | Refills: 2 | Status: SHIPPED | OUTPATIENT
Start: 2024-12-02

## 2024-12-02 NOTE — TELEPHONE ENCOUNTER
Received request via: Pharmacy    Was the patient seen in the last year in this department? Yes  LOV : 6/24/2024  Does the patient have an active prescription (recently filled or refills available) for medication(s) requested? No    [facility]    Does the patient have [facility] Plus and need 100-day supply? (This applies to ALL medications) Patient does not have SCP

## 2025-01-02 ENCOUNTER — OFFICE VISIT (OUTPATIENT)
Dept: URGENT CARE | Facility: CLINIC | Age: 55
End: 2025-01-02
Payer: COMMERCIAL

## 2025-01-02 VITALS
WEIGHT: 190 LBS | RESPIRATION RATE: 16 BRPM | DIASTOLIC BLOOD PRESSURE: 78 MMHG | BODY MASS INDEX: 28.79 KG/M2 | OXYGEN SATURATION: 97 % | SYSTOLIC BLOOD PRESSURE: 102 MMHG | HEIGHT: 68 IN | HEART RATE: 102 BPM | TEMPERATURE: 97 F

## 2025-01-02 DIAGNOSIS — F41.9 ACUTE ANXIETY: ICD-10-CM

## 2025-01-02 DIAGNOSIS — Z65.9 OTHER SOCIAL STRESSOR: ICD-10-CM

## 2025-01-02 PROCEDURE — 3078F DIAST BP <80 MM HG: CPT | Performed by: NURSE PRACTITIONER

## 2025-01-02 PROCEDURE — 99213 OFFICE O/P EST LOW 20 MIN: CPT | Performed by: NURSE PRACTITIONER

## 2025-01-02 PROCEDURE — 3074F SYST BP LT 130 MM HG: CPT | Performed by: NURSE PRACTITIONER

## 2025-01-02 RX ORDER — HYDROXYZINE HYDROCHLORIDE 25 MG/1
25 TABLET, FILM COATED ORAL 3 TIMES DAILY PRN
Qty: 30 TABLET | Refills: 0 | Status: SHIPPED | OUTPATIENT
Start: 2025-01-02

## 2025-01-02 ASSESSMENT — FIBROSIS 4 INDEX: FIB4 SCORE: 1.354838709677419355

## 2025-01-02 NOTE — PROGRESS NOTES
"Gladis Tovar is a 54 y.o. female who presents for Anxiety (Anxiety attack about 1 hour ago ) and Medication Refill (Anxiety medication )      HPI  This is a new problem. Gladis Tovar is a 54 y.o. patient who presents to urgent care with c/o:  hx of anxiety her entire life. 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  Her Mom is ill and it triggered an anxiety attack about an hour ago.  She says that her Mom is really mean to her. She has social supports and her son just moved in with her which she says should help reduce her stress.   Denies SI/ HI thoughts.    Tx tried: She usually tries to do sound therapy, relaxation, breathing exercises and she is finding it hard to do.  She currently has counseling that she has had for 30 years.   She did not reach out to her PCP today.       ROS See HPI    Allergies:       Allergies   Allergen Reactions    Codeine Unspecified     Heart stops  RXN=age 3       PMSFS Hx:  Past Medical History:   Diagnosis Date    Asthma 05/17/2019    Depression     Snoring     no sleep study    Urinary incontinence 05/17/2019    \"mild\"     Past Surgical History:   Procedure Laterality Date    PB FUSION BIG TOE,MT-P JT Right 11/21/2022    Procedure: RIGHT FOOT HALLUX METATARSALPHALANGEAL JOINT FUSION;  Surgeon: Chad Mason M.D.;  Location: [facility];  Service: Orthopedics    PB REMV BONE FOR GRAFT MAJOR Right 11/21/2022    Procedure: RIGHT CALCANEAL AUTOGRAFT;  Surgeon: Chad Mason M.D.;  Location: [facility];  Service: Orthopedics    DC PELVIC EXAMINATION W ANESTH N/A 6/24/2019    Procedure: EXAM UNDER ANESTHESIA, PELVIS;  Surgeon: Edwina Miranda M.D.;  Location: SURGERY SAME DAY [facility];  Service: Gynecology    DC LAP,DIAGNOSTIC ABDOMEN  6/24/2019    Procedure: PELVISCOPY;  Surgeon: Edwina Miranda M.D.;  Location: SURGERY SAME DAY [facility];  Service: Gynecology    CYSTOSCOPY  6/24/2019    Procedure: CYSTOSCOPY;  Surgeon: " Edwina Miranda M.D.;  Location: SURGERY SAME DAY [facility];  Service: Gynecology    VAGINAL HYSTERECTOMY SCOPE TOTAL  6/24/2019    Procedure: HYSTERECTOMY, TOTAL, VAGINAL, LAPAROSCOPY-ASSISTED;  Surgeon: Edwina Miranda M.D.;  Location: SURGERY SAME DAY [facility];  Service: Gynecology    NC PELVIC EXAMINATION W ANESTH  5/23/2019    Procedure: EXAM UNDER ANESTHESIA, PELVIS;  Surgeon: Edwina Miranda M.D.;  Location: SURGERY SAME DAY [facility];  Service: Gynecology    NC LAP,DIAGNOSTIC ABDOMEN  5/23/2019    Procedure: PELVISCOPY;  Surgeon: Edwina Miranda M.D.;  Location: SURGERY SAME DAY [facility];  Service: Gynecology    NC HYSTEROSCOPY,DX,SEP PROC  5/23/2019    Procedure: HYSTEROSCOPY, DIAGNOSTIC- AND EXCISION OF POLY/FIBROID AND ENDOMETRIAL CURETTAGE;  Surgeon: Edwina Miranda M.D.;  Location: SURGERY SAME DAY [facility];  Service: Gynecology    HYSTEROSCOPY WITH VIDEO OPERATIVE  5/23/2019    Procedure: HYSTEROSCOPY, WITH VIDEO IMAGING;  Surgeon: Edwina Miranda M.D.;  Location: SURGERY SAME DAY [facility];  Service: Gynecology    HYSTEROSCOPY WITH MYOSURE  5/23/2019    Procedure: HYSTEROSCOPY, WITH TISSUE REMOVAL, USING HYSTEROSCOPIC ROTATING CUTTER BLADE;  Surgeon: Edwina Miranda M.D.;  Location: SURGERY SAME DAY [facility];  Service: Gynecology    SALPINGECTOMY Bilateral 5/23/2019    Procedure: SALPINGECTOMY;  Surgeon: Edwina Miranda M.D.;  Location: SURGERY SAME DAY [facility];  Service: Gynecology    SHOULDER SURGERY Bilateral 2012    APPENDECTOMY  1994    BREAST RECONSTRUCTION  1992    REDUCTION    CERVICAL CERCLAGE  1994/1996/1999    x 3    TONSILLECTOMY       Family History   Problem Relation Age of Onset    Lung Disease Mother     Arthritis Mother     Diabetes Mother     Hypertension Father     Alcohol/Drug Brother     Arthritis Maternal Grandmother     Cancer Maternal Grandmother     Diabetes Maternal Grandmother       Social History     Tobacco Use    Smoking status: Never    Smokeless tobacco: Never   Substance Use Topics    Alcohol use: Not Currently         Problems:   Patient Active Problem List   Diagnosis    Class 2 obesity without serious comorbidity with body mass index (BMI) of 38.0 to 38.9 in adult    Episode of recurrent major depressive disorder (HCC)    Mild intermittent asthma without complication    [facility]     Elevated alkaline phosphatase level    S/P hysterectomy    Prediabetes    Primary insomnia    Acquired hypothyroidism    Dyslipidemia    Right foot pain    Female stress incontinence    Extensor carpi ulnaris tendinitis       Medications:   Current Outpatient Medications on File Prior to Visit   Medication Sig Dispense Refill    fluoxetine (PROZAC) 40 MG capsule TAKE 1 CAPSULE BY MOUTH EVERY DAY 90 Capsule 2    progesterone (PROMETRIUM) 100 MG Cap TAKE 1 CAPSULE BY MOUTH EVERY DAY IN THE EVENING 90 Capsule 1    buPROPion (WELLBUTRIN XL) 300 MG XL tablet Take 1 Tablet by mouth every morning. 90 Tablet 3    levothyroxine (SYNTHROID) 50 MCG Tab Take 1 Tablet by mouth every morning on an empty stomach. 90 Tablet 3    estradiol (CLIMARA) 0.05 MG/24HR PATCH WEEKLY PLACE 1 PATCH ON THE SKIN EVERY 7 DAYS 12 Patch 1    ondansetron (ZOFRAN ODT) 4 MG TABLET DISPERSIBLE DISSOLVE 1 TABLET ON TOP OF TONGUE 3 TIMES DAILY AS NEEDED FOR NAUSEA (MAX 1.6 TABS/DAY PER INS)      promethazine (PHENERGAN) 12.5 MG tablet 1 TAB BY MOUTH EVERY 6 HOURS OR AS NEEDED FOR NAUSEA/VOMITING      Mirabegron ER (MYRBETRIQ) 25 MG TABLET SR 24 HR Myrbetriq 25 mg tablet,extended release   TAKE 1 TABLET BY MOUTH EVERY DAY      albuterol 108 (90 Base) MCG/ACT Aero Soln inhalation aerosol Inhale 2 Puffs every 6 hours as needed for Shortness of Breath. (Patient not taking: Reported on 1/2/2025) 8.5 g 0     No current facility-administered medications on file prior to visit.        Objective:     /78   Pulse  "(!) 102   Temp 36.1 °C (97 °F) (Temporal)   Resp 16   Ht 1.727 m (5' 8\")   Wt 86.2 kg (190 lb)   LMP  (LMP Unknown)   SpO2 97%   BMI 28.89 kg/m²     Physical Exam  Vitals and nursing note reviewed.   Cardiovascular:      Rate and Rhythm: Tachycardia present.   Pulmonary:      Effort: Pulmonary effort is normal.   Skin:     General: Skin is warm and dry.   Psychiatric:         Attention and Perception: Attention and perception normal.         Mood and Affect: Mood is anxious. Affect is tearful.         Speech: Speech normal.         Behavior: Behavior normal. Behavior is cooperative.         Thought Content: Thought content normal.         Cognition and Memory: Cognition and memory normal.         Judgment: Judgment normal.         Assessment /Associated Orders:      1. Acute anxiety  hydrOXYzine HCl (ATARAX) 25 MG Tab    Referral back to PCP      2. Other social stressor  Referral back to PCP            Medical Decision Making:    Gladis Tovar is a very pleasant 54 y.o. female who is clinically stable at today's acute urgent care visit. Presents with acute problem/ concern today.    No acute distress is noted at the time of the visit.  VSS. Appropriate for outpatient care at this time.     Educated in proper administration of  prescription medication(s) ordered today including safety, possible SE, risks, benefits, rationale and alternatives to therapy.   Educated not to drive, drink alcohol, operate machinery, or do anything that requires mental alertness while taking RX medication that has sedation/ drowsiness as a side effect.  Allow an 8-hour wear off time before participating in any of these activities.  Relaxation techniques reviewed  Declines referral to [facility].       Through shared decision making a discussion of the Dx and DDx, management options (risks,benefits, and alternatives to planned treatment), natural progression and supportive care.  Expressed understanding and the treatment " plan was agreed upon.   Questions were encouraged and answered     Follow Up:   Schedule FU with counselor   Schedule FU with PCP  - referral placed.   Return to urgent care prn if new or worsening sx or if there is no improvement in condition prn.    Educated in Red flags and indications to immediately call 911 or present to the Emergency Department.             Please note that this dictation was created using voice recognition software. I have worked with consultants from the vendor as well as technical experts from [facility] to optimize the interface. I have made every reasonable attempt to correct obvious errors, but I expect that there are errors of grammar and possibly content that I did not discover before finalizing the note.  This note was electronically signed by provider

## 2025-01-16 ENCOUNTER — TELEPHONE (OUTPATIENT)
Dept: HEALTH INFORMATION MANAGEMENT | Facility: OTHER | Age: 55
End: 2025-01-16
Payer: COMMERCIAL

## 2025-01-31 NOTE — PATIENT INSTRUCTIONS
Fluoxetine capsules or tablets (Depression/Mood Disorders)  What is this medicine?  FLUOXETINE (floo OX e teen) belongs to a class of drugs known as selective serotonin reuptake inhibitors (SSRIs). It helps to treat mood problems such as depression, obsessive compulsive disorder, and panic attacks. It can also treat certain eating disorders.  This medicine may be used for other purposes; ask your health care provider or pharmacist if you have questions.  COMMON BRAND NAME(S): Prozac  What should I tell my health care provider before I take this medicine?  They need to know if you have any of these conditions:  · bipolar disorder or a family history of bipolar disorder  · bleeding disorders  · glaucoma  · heart disease  · liver disease  · low levels of sodium in the blood  · seizures  · suicidal thoughts, plans, or attempt; a previous suicide attempt by you or a family member  · take MAOIs like Carbex, Eldepryl, Marplan, Nardil, and Parnate  · take medicines that treat or prevent blood clots  · thyroid disease  · an unusual or allergic reaction to fluoxetine, other medicines, foods, dyes, or preservatives  · pregnant or trying to get pregnant  · breast-feeding  How should I use this medicine?  Take this medicine by mouth with a glass of water. Follow the directions on the prescription label. You can take this medicine with or without food. Take your medicine at regular intervals. Do not take it more often than directed. Do not stop taking this medicine suddenly except upon the advice of your doctor. Stopping this medicine too quickly may cause serious side effects or your condition may worsen.  A special MedGuide will be given to you by the pharmacist with each prescription and refill. Be sure to read this information carefully each time.  Talk to your pediatrician regarding the use of this medicine in children. While this drug may be prescribed for children as young as 7 years for selected conditions, precautions do  pleasant, well nourished, well developed, in no acute distress , normal communication ability apply.  Overdosage: If you think you have taken too much of this medicine contact a poison control center or emergency room at once.  NOTE: This medicine is only for you. Do not share this medicine with others.  What if I miss a dose?  If you miss a dose, skip the missed dose and go back to your regular dosing schedule. Do not take double or extra doses.  What may interact with this medicine?  Do not take this medicine with any of the following medications:  · other medicines containing fluoxetine, like Sarafem or Symbyax  · cisapride  · dronedarone  · linezolid  · MAOIs like Carbex, Eldepryl, Marplan, Nardil, and Parnate  · methylene blue (injected into a vein)  · pimozide  · thioridazine  This medicine may also interact with the following medications:  · alcohol  · amphetamines  · aspirin and aspirin-like medicines  · carbamazepine  · certain medicines for depression, anxiety, or psychotic disturbances  · certain medicines for migraine headaches like almotriptan, eletriptan, frovatriptan, naratriptan, rizatriptan, sumatriptan, zolmitriptan  · digoxin  · diuretics  · fentanyl  · flecainide  · furazolidone  · isoniazid  · lithium  · medicines for sleep  · medicines that treat or prevent blood clots like warfarin, enoxaparin, and dalteparin  · NSAIDs, medicines for pain and inflammation, like ibuprofen or naproxen  · other medicines that prolong the QT interval (an abnormal heart rhythm)  · phenytoin  · procarbazine  · propafenone  · rasagiline  · ritonavir  · supplements like Delicia's wort, kava kava, valerian  · tramadol  · tryptophan  · vinblastine  This list may not describe all possible interactions. Give your health care provider a list of all the medicines, herbs, non-prescription drugs, or dietary supplements you use. Also tell them if you smoke, drink alcohol, or use illegal drugs. Some items may interact with your medicine.  What should I watch for while using this medicine?  Tell your doctor if your  symptoms do not get better or if they get worse. Visit your doctor or health care professional for regular checks on your progress. Because it may take several weeks to see the full effects of this medicine, it is important to continue your treatment as prescribed by your doctor.  Patients and their families should watch out for new or worsening thoughts of suicide or depression. Also watch out for sudden changes in feelings such as feeling anxious, agitated, panicky, irritable, hostile, aggressive, impulsive, severely restless, overly excited and hyperactive, or not being able to sleep. If this happens, especially at the beginning of treatment or after a change in dose, call your health care professional.  You may get drowsy or dizzy. Do not drive, use machinery, or do anything that needs mental alertness until you know how this medicine affects you. Do not stand or sit up quickly, especially if you are an older patient. This reduces the risk of dizzy or fainting spells. Alcohol may interfere with the effect of this medicine. Avoid alcoholic drinks.  Your mouth may get dry. Chewing sugarless gum or sucking hard candy, and drinking plenty of water may help. Contact your doctor if the problem does not go away or is severe.  This medicine may affect blood sugar levels. If you have diabetes, check with your doctor or health care professional before you change your diet or the dose of your diabetic medicine.  What side effects may I notice from receiving this medicine?  Side effects that you should report to your doctor or health care professional as soon as possible:  · allergic reactions like skin rash, itching or hives, swelling of the face, lips, or tongue  · anxious  · black, tarry stools  · breathing problems  · changes in vision  · confusion  · elevated mood, decreased need for sleep, racing thoughts, impulsive behavior  · eye pain  · fast, irregular heartbeat  · feeling faint or lightheaded, falls  · feeling  agitated, angry, or irritable  · hallucination, loss of contact with reality  · loss of balance or coordination  · loss of memory  · painful or prolonged erections  · restlessness, pacing, inability to keep still  · seizures  · stiff muscles  · suicidal thoughts or other mood changes  · trouble sleeping  · unusual bleeding or bruising  · unusually weak or tired  · vomiting  Side effects that usually do not require medical attention (report to your doctor or health care professional if they continue or are bothersome):  · change in appetite or weight  · change in sex drive or performance  · diarrhea  · dry mouth  · headache  · increased sweating  · nausea  · tremors  This list may not describe all possible side effects. Call your doctor for medical advice about side effects. You may report side effects to FDA at 4-348-IRQ-2654.  Where should I keep my medicine?  Keep out of the reach of children.  Store at room temperature between 15 and 30 degrees C (59 and 86 degrees F). Throw away any unused medicine after the expiration date.  NOTE: This sheet is a summary. It may not cover all possible information. If you have questions about this medicine, talk to your doctor, pharmacist, or health care provider.  © 2020 [employer]/Gold Standard (2019-08-08 11:56:53)

## 2025-02-26 DIAGNOSIS — Z79.890 HORMONE REPLACEMENT THERAPY (HRT): ICD-10-CM

## 2025-02-26 NOTE — TELEPHONE ENCOUNTER
Received request via: Pharmacy    Was the patient seen in the last year in this department? Yes    Does the patient have an active prescription (recently filled or refills available) for medication(s) requested? No    Pharmacy Name: [facility]     Does the patient have [facility] and need 100-day supply? (This applies to ALL medications) Patient does not have SCP

## 2025-02-27 RX ORDER — ESTRADIOL 0.05 MG/D
1 PATCH TRANSDERMAL
Qty: 12 PATCH | Refills: 1 | Status: SHIPPED | OUTPATIENT
Start: 2025-02-27

## 2025-03-11 ENCOUNTER — OFFICE VISIT (OUTPATIENT)
Dept: URGENT CARE | Facility: CLINIC | Age: 55
End: 2025-03-11
Payer: COMMERCIAL

## 2025-03-11 VITALS
TEMPERATURE: 97.8 F | BODY MASS INDEX: 32.72 KG/M2 | WEIGHT: 196.4 LBS | OXYGEN SATURATION: 94 % | DIASTOLIC BLOOD PRESSURE: 74 MMHG | HEIGHT: 65 IN | RESPIRATION RATE: 19 BRPM | HEART RATE: 87 BPM | SYSTOLIC BLOOD PRESSURE: 124 MMHG

## 2025-03-11 DIAGNOSIS — J04.0 LARYNGITIS: ICD-10-CM

## 2025-03-11 DIAGNOSIS — R05.1 ACUTE COUGH: ICD-10-CM

## 2025-03-11 DIAGNOSIS — J98.8 RTI (RESPIRATORY TRACT INFECTION): ICD-10-CM

## 2025-03-11 PROCEDURE — 3074F SYST BP LT 130 MM HG: CPT | Performed by: PHYSICIAN ASSISTANT

## 2025-03-11 PROCEDURE — 3078F DIAST BP <80 MM HG: CPT | Performed by: PHYSICIAN ASSISTANT

## 2025-03-11 PROCEDURE — 99214 OFFICE O/P EST MOD 30 MIN: CPT | Performed by: PHYSICIAN ASSISTANT

## 2025-03-11 RX ORDER — DEXAMETHASONE SODIUM PHOSPHATE 10 MG/ML
10 INJECTION, SOLUTION INTRA-ARTICULAR; INTRALESIONAL; INTRAMUSCULAR; INTRAVENOUS; SOFT TISSUE ONCE
Status: COMPLETED | OUTPATIENT
Start: 2025-03-11 | End: 2025-03-11

## 2025-03-11 RX ORDER — DOXYCYCLINE HYCLATE 100 MG
100 TABLET ORAL 2 TIMES DAILY
Qty: 14 TABLET | Refills: 0 | Status: SHIPPED | OUTPATIENT
Start: 2025-03-11 | End: 2025-03-18

## 2025-03-11 RX ORDER — ALBUTEROL SULFATE 90 UG/1
2 INHALANT RESPIRATORY (INHALATION) EVERY 6 HOURS PRN
Qty: 8.5 G | Refills: 0 | Status: SHIPPED | OUTPATIENT
Start: 2025-03-11

## 2025-03-11 RX ORDER — DEXTROMETHORPHAN HYDROBROMIDE AND PROMETHAZINE HYDROCHLORIDE 15; 6.25 MG/5ML; MG/5ML
5 SYRUP ORAL EVERY 6 HOURS PRN
Qty: 100 ML | Refills: 0 | Status: SHIPPED | OUTPATIENT
Start: 2025-03-11 | End: 2025-03-16

## 2025-03-11 RX ADMIN — DEXAMETHASONE SODIUM PHOSPHATE 10 MG: 10 INJECTION, SOLUTION INTRA-ARTICULAR; INTRALESIONAL; INTRAMUSCULAR; INTRAVENOUS; SOFT TISSUE at 17:41

## 2025-03-11 ASSESSMENT — ENCOUNTER SYMPTOMS
CHILLS: 0
SORE THROAT: 1
FEVER: 0
COUGH: 1
SPUTUM PRODUCTION: 1
MYALGIAS: 1

## 2025-03-11 ASSESSMENT — FIBROSIS 4 INDEX: FIB4 SCORE: 1.354838709677419355

## 2025-03-12 NOTE — PROGRESS NOTES
Subjective:   Gladis Tovar is a 54 y.o. female who presents today with   Chief Complaint   Patient presents with    Pharyngitis     4 days      Pharyngitis   This is a new problem. The current episode started in the past 7 days. The problem has been unchanged. There has been no fever. The pain is mild. Associated symptoms include coughing. Treatments tried: otc cold/flu. The treatment provided mild relief.     Cough is keeping patient up at night.    PMH:  has a past medical history of Asthma (05/17/2019), Depression, Snoring, and Urinary incontinence (05/17/2019).  MEDS:   Current Outpatient Medications:     promethazine-dextromethorphan (PROMETHAZINE-DM) 6.25-15 MG/5ML syrup, Take 5 mL by mouth every 6 hours as needed for Cough for up to 5 days., Disp: 100 mL, Rfl: 0    albuterol 108 (90 Base) MCG/ACT Aero Soln inhalation aerosol, Inhale 2 Puffs every 6 hours as needed for Shortness of Breath., Disp: 8.5 g, Rfl: 0    doxycycline (VIBRAMYCIN) 100 MG Tab, Take 1 Tablet by mouth 2 times a day for 7 days., Disp: 14 Tablet, Rfl: 0    estradiol (CLIMARA) 0.05 MG/24HR PATCH WEEKLY, PLACE 1 PATCH ON THE SKIN EVERY 7 DAYS, Disp: 12 Patch, Rfl: 1    hydrOXYzine HCl (ATARAX) 25 MG Tab, Take 1 Tablet by mouth 3 times a day as needed for Anxiety., Disp: 30 Tablet, Rfl: 0    fluoxetine (PROZAC) 40 MG capsule, TAKE 1 CAPSULE BY MOUTH EVERY DAY, Disp: 90 Capsule, Rfl: 2    progesterone (PROMETRIUM) 100 MG Cap, TAKE 1 CAPSULE BY MOUTH EVERY DAY IN THE EVENING, Disp: 90 Capsule, Rfl: 1    buPROPion (WELLBUTRIN XL) 300 MG XL tablet, Take 1 Tablet by mouth every morning., Disp: 90 Tablet, Rfl: 3    levothyroxine (SYNTHROID) 50 MCG Tab, Take 1 Tablet by mouth every morning on an empty stomach., Disp: 90 Tablet, Rfl: 3    ondansetron (ZOFRAN ODT) 4 MG TABLET DISPERSIBLE, DISSOLVE 1 TABLET ON TOP OF TONGUE 3 TIMES DAILY AS NEEDED FOR NAUSEA (MAX 1.6 TABS/DAY PER INS), Disp: , Rfl:     promethazine (PHENERGAN) 12.5 MG tablet, 1 TAB BY  MOUTH EVERY 6 HOURS OR AS NEEDED FOR NAUSEA/VOMITING, Disp: , Rfl:     Mirabegron ER (MYRBETRIQ) 25 MG TABLET SR 24 HR, Myrbetriq 25 mg tablet,extended release  TAKE 1 TABLET BY MOUTH EVERY DAY, Disp: , Rfl:   ALLERGIES:   Allergies   Allergen Reactions    Codeine Unspecified     Heart stops  RXN=age 3     SURGHX:   Past Surgical History:   Procedure Laterality Date    PB FUSION BIG TOE,MT-P JT Right 11/21/2022    Procedure: RIGHT FOOT HALLUX METATARSALPHALANGEAL JOINT FUSION;  Surgeon: Chad Mason M.D.;  Location: [facility];  Service: Orthopedics    PB REMV BONE FOR GRAFT MAJOR Right 11/21/2022    Procedure: RIGHT CALCANEAL AUTOGRAFT;  Surgeon: Chad Mason M.D.;  Location: [facility];  Service: Orthopedics    NC PELVIC EXAMINATION W ANESTH N/A 6/24/2019    Procedure: EXAM UNDER ANESTHESIA, PELVIS;  Surgeon: Edwina Miranda M.D.;  Location: SURGERY SAME DAY [facility];  Service: Gynecology    NC LAP,DIAGNOSTIC ABDOMEN  6/24/2019    Procedure: PELVISCOPY;  Surgeon: Edwina Miranda M.D.;  Location: SURGERY SAME DAY [facility];  Service: Gynecology    CYSTOSCOPY  6/24/2019    Procedure: CYSTOSCOPY;  Surgeon: Edwina Miranda M.D.;  Location: SURGERY SAME DAY [facility];  Service: Gynecology    VAGINAL HYSTERECTOMY SCOPE TOTAL  6/24/2019    Procedure: HYSTERECTOMY, TOTAL, VAGINAL, LAPAROSCOPY-ASSISTED;  Surgeon: Edwina Miranda M.D.;  Location: SURGERY SAME DAY [facility];  Service: Gynecology    NC PELVIC EXAMINATION W ANESTH  5/23/2019    Procedure: EXAM UNDER ANESTHESIA, PELVIS;  Surgeon: Edwina Miranda M.D.;  Location: SURGERY SAME DAY [facility];  Service: Gynecology    NC LAP,DIAGNOSTIC ABDOMEN  5/23/2019    Procedure: PELVISCOPY;  Surgeon: Edwina Miranda M.D.;  Location: SURGERY SAME DAY [facility];  Service: Gynecology    NC HYSTEROSCOPY,DX,SEP PROC  5/23/2019    Procedure: HYSTEROSCOPY,  "DIAGNOSTIC- AND EXCISION OF POLY/FIBROID AND ENDOMETRIAL CURETTAGE;  Surgeon: Edwina Miranda M.D.;  Location: SURGERY SAME DAY [facility];  Service: Gynecology    HYSTEROSCOPY WITH VIDEO OPERATIVE  5/23/2019    Procedure: HYSTEROSCOPY, WITH VIDEO IMAGING;  Surgeon: Edwina Miranda M.D.;  Location: SURGERY SAME DAY [facility];  Service: Gynecology    HYSTEROSCOPY WITH MYOSURE  5/23/2019    Procedure: HYSTEROSCOPY, WITH TISSUE REMOVAL, USING HYSTEROSCOPIC ROTATING CUTTER BLADE;  Surgeon: Edwina Miranda M.D.;  Location: SURGERY SAME DAY [facility];  Service: Gynecology    SALPINGECTOMY Bilateral 5/23/2019    Procedure: SALPINGECTOMY;  Surgeon: Edwina Miranda M.D.;  Location: SURGERY SAME DAY [facility];  Service: Gynecology    SHOULDER SURGERY Bilateral 2012    APPENDECTOMY  1994    BREAST RECONSTRUCTION  1992    REDUCTION    CERVICAL CERCLAGE  1994/1996/1999    x 3    TONSILLECTOMY       SOCHX:  reports that she has never smoked. She has never used smokeless tobacco. She reports that she does not currently use alcohol. She reports that she does not use drugs.  FH: Reviewed with patient, not pertinent to this visit.     Review of Systems   Constitutional:  Negative for chills and fever.   HENT:  Positive for sore throat.    Respiratory:  Positive for cough and sputum production.    Musculoskeletal:  Positive for myalgias.      Objective:   /74   Pulse 87   Temp 36.6 °C (97.8 °F) (Temporal)   Resp 19   Ht 1.651 m (5' 5\")   Wt 89.1 kg (196 lb 6.4 oz)   LMP  (LMP Unknown)   SpO2 94%   BMI 32.68 kg/m²   Physical Exam  Vitals and nursing note reviewed.   Constitutional:       General: She is not in acute distress.     Appearance: Normal appearance. She is well-developed. She is not ill-appearing or toxic-appearing.      Comments: Hoarse voice   HENT:      Head: Normocephalic and atraumatic.      Right Ear: Hearing and ear canal normal. A middle ear effusion is " present.      Left Ear: Hearing and ear canal normal. A middle ear effusion is present. Tympanic membrane is erythematous.      Mouth/Throat:      Mouth: Mucous membranes are moist.      Pharynx: No oropharyngeal exudate or posterior oropharyngeal erythema.   Cardiovascular:      Rate and Rhythm: Normal rate and regular rhythm.      Heart sounds: Normal heart sounds.   Pulmonary:      Effort: Pulmonary effort is normal. No respiratory distress.      Breath sounds: Normal breath sounds. No stridor. No wheezing, rhonchi or rales.   Musculoskeletal:      Comments: Normal movement in all 4 extremities   Skin:     General: Skin is warm and dry.   Neurological:      Mental Status: She is alert.      Coordination: Coordination normal.   Psychiatric:         Mood and Affect: Mood normal.       Assessment/Plan:   Assessment    1. Acute cough  - dexamethasone (Decadron) injection (check route below) 10 mg  - promethazine-dextromethorphan (PROMETHAZINE-DM) 6.25-15 MG/5ML syrup; Take 5 mL by mouth every 6 hours as needed for Cough for up to 5 days.  Dispense: 100 mL; Refill: 0    2. Laryngitis  - dexamethasone (Decadron) injection (check route below) 10 mg    3. RTI (respiratory tract infection)  - promethazine-dextromethorphan (PROMETHAZINE-DM) 6.25-15 MG/5ML syrup; Take 5 mL by mouth every 6 hours as needed for Cough for up to 5 days.  Dispense: 100 mL; Refill: 0  - albuterol 108 (90 Base) MCG/ACT Aero Soln inhalation aerosol; Inhale 2 Puffs every 6 hours as needed for Shortness of Breath.  Dispense: 8.5 g; Refill: 0  - doxycycline (VIBRAMYCIN) 100 MG Tab; Take 1 Tablet by mouth 2 times a day for 7 days.  Dispense: 14 Tablet; Refill: 0    Symptoms and presentation appear consistent with respiratory tract infection and given duration of symptoms along with signs of left-sided ear infection I recommend treating with antibiotics to cover for bacterial etiology.  Will also send over cough suppressant medication for patient to use  sparingly only as prescribed and mostly at night not before driving or working.  She understands not to take any other medication that could cause her to be drowsy including any Benadryl or NyQuil and not to drink any alcohol.  She can also use inhaler as well.  No indication for x-ray on exam today.    Differential diagnosis, natural history, supportive care, and indications for immediate follow-up discussed.   Patient given instructions and understanding of medications and treatment.    If not improving in 3-5 days, F/U with PCP or return to  if symptoms worsen.  Strict ER precautions given.  Patient agreeable to plan.    Please note that this dictation was created using voice recognition software. I have made every reasonable attempt to correct obvious errors, but I expect that there are errors of grammar and possibly content that I did not discover before finalizing the note.    Quentin Scott PA-C

## 2025-06-08 DIAGNOSIS — Z79.890 HORMONE REPLACEMENT THERAPY (HRT): ICD-10-CM

## 2025-06-09 RX ORDER — PROGESTERONE 100 MG/1
100 CAPSULE ORAL EVERY EVENING
Qty: 90 CAPSULE | Refills: 1 | Status: SHIPPED | OUTPATIENT
Start: 2025-06-09

## 2025-07-25 ENCOUNTER — OFFICE VISIT (OUTPATIENT)
Dept: MEDICAL GROUP | Facility: LAB | Age: 55
End: 2025-07-25
Payer: COMMERCIAL

## 2025-07-25 VITALS
RESPIRATION RATE: 16 BRPM | WEIGHT: 190 LBS | TEMPERATURE: 97 F | DIASTOLIC BLOOD PRESSURE: 72 MMHG | SYSTOLIC BLOOD PRESSURE: 116 MMHG | HEIGHT: 65 IN | BODY MASS INDEX: 31.65 KG/M2 | HEART RATE: 76 BPM | OXYGEN SATURATION: 99 %

## 2025-07-25 DIAGNOSIS — E03.9 ACQUIRED HYPOTHYROIDISM: ICD-10-CM

## 2025-07-25 DIAGNOSIS — E78.5 DYSLIPIDEMIA: ICD-10-CM

## 2025-07-25 DIAGNOSIS — Z12.31 ENCOUNTER FOR SCREENING MAMMOGRAM FOR MALIGNANT NEOPLASM OF BREAST: ICD-10-CM

## 2025-07-25 DIAGNOSIS — N30.10 INTERSTITIAL CYSTITIS: ICD-10-CM

## 2025-07-25 DIAGNOSIS — Z79.890 HORMONE REPLACEMENT THERAPY (HRT): Primary | ICD-10-CM

## 2025-07-25 DIAGNOSIS — E55.9 VITAMIN D INSUFFICIENCY: ICD-10-CM

## 2025-07-25 DIAGNOSIS — Z13.1 SCREENING FOR DIABETES MELLITUS: ICD-10-CM

## 2025-07-25 DIAGNOSIS — E55.9 VITAMIN D DEFICIENCY: ICD-10-CM

## 2025-07-25 RX ORDER — ESTRADIOL 0.05 MG/D
1 PATCH, EXTENDED RELEASE TRANSDERMAL
Qty: 24 PATCH | Refills: 1 | Status: SHIPPED | OUTPATIENT
Start: 2025-07-25

## 2025-07-25 RX ORDER — ESTRADIOL 0.1 MG/G
1 CREAM VAGINAL
Qty: 42.5 G | Refills: 3 | Status: SHIPPED | OUTPATIENT
Start: 2025-07-25

## 2025-07-25 RX ORDER — PROGESTERONE 200 MG/1
200 CAPSULE ORAL EVERY EVENING
Qty: 90 CAPSULE | Refills: 2 | Status: SHIPPED | OUTPATIENT
Start: 2025-07-25 | End: 2025-10-23

## 2025-07-25 ASSESSMENT — FIBROSIS 4 INDEX: FIB4 SCORE: 1.354838709677419355

## 2025-07-25 NOTE — PROGRESS NOTES
"Subjective:     Chief Complaint   Patient presents with    Medication Refill         HPI:   Gladis presents today for med refills and anxiety concerns.   Significant family stressors with her mom who is in poor health and with her relationship with her.   While at work she is fine, but then when she comes home she is triggered. Going back to work which will help, but still struggling with falling asleep and staying asleep. Waking between 2-4 am.   Working on healthy habits, losing weight, goal is under 150 lbs.   Active at home, in yard.   Some increased issues with interstitial cystitis more recently as well.   Sees urology [place].     She brings up concerns about cortisol levels.  She reports she is seeing things all over the place talking about elevated cortisol levels and she wonders if she should have these checked.    Current Medications and Prescriptions Ordered in Epic[1]      ROS:  Gen: no fevers/chills, no changes in weight  Eyes: no changes in vision  ENT: no sore throat, no hearing loss, no bloody nose  Pulm: no sob, no cough  CV: no chest pain, no palpitations  GI: no nausea/vomiting, no diarrhea  : no dysuria  MSk: no myalgias  Skin: no rash  Neuro: no headaches, no numbness/tingling  Heme/Lymph: no easy bruising      Objective:     Exam:  /72   Pulse 76   Temp 36.1 °C (97 °F)   Resp 16   Ht 1.651 m (5' 5\")   Wt 86.2 kg (190 lb)   LMP  (LMP Unknown)   SpO2 99%   BMI 31.62 kg/m²  Body mass index is 31.62 kg/m².    Gen: AAOx3, NAD, well appearing  HEENT: NCAT, EOMI, Nares patent, Mucosa moist  Resp: Normal chest wall rise and fall, not SOB, no tachypnea  Skin: no rash or abnormality of visible skin.   Psych: normal speech, not slurred, good insight, affect full  MSK: Moves all four limbs equally and normally, gait normal      Assessment & Plan:     54 y.o. female with the following -     1. Hormone replacement therapy (HRT) (Primary)  Discussed overall health.  She is due for some regular " routine labs so we will get these ordered.  We did discuss adjusting her medications that she is already on to hopefully better suit her sleep as well as anxiety levels.  Will try to increase her progesterone to see how she feels with this.  Will switch from a weekly estradiol patch to a twice weekly so that she has better absorption and adhesive properties.  We did discuss eventually considering adjusting some of her mental health medications.  If she is feeling more anxious of late her Wellbutrin may be stimulating this.  She also has been on fluoxetine for the last 2 years since her dad  and wonders if this is really helping or not.  Will address these going forward depending upon how she does with just some hormone adjustments.  Discussed the difficulty in drawing cortisol levels.  We discussed that cortisol is naturally fluctuating based on life and stressors as well.  She can have a chronically elevated cortisol but be within the normal range on her lab.  We did discuss that the things that improve her cortisol levels are the usual things that we try to strive for every day including improved sleep, improved diet, improved exercise, healthy relationships, accomplishment at work.  If we work on these things the cortisol levels will likely drop on their own and she should be feeling better.  - estradiol (VIVELLE DOT) 0.05 MG/24HR PATCH BIWEEKLY; Place 1 Patch on the skin two times a week.  Dispense: 24 Patch; Refill: 1  - progesterone (PROMETRIUM) 200 MG capsule; Take 1 Capsule by mouth every evening for 90 days.  Dispense: 90 Capsule; Refill: 2  - estradiol (ESTRACE) 0.1 MG/GM vaginal cream; Insert 1 g into the vagina two times a week.  Dispense: 42.5 g; Refill: 3    2. Interstitial cystitis  Discussed use of estradiol cream vaginally both postmenopausal and for interstitial cystitis.  Discussed use of this cream.  Discussed twice weekly frequency.  This is prescribed and sent in.  Discussed the benefit of  preventing urinary tract infections but also improving overall genitourinary syndrome of menopause.  - estradiol (ESTRACE) 0.1 MG/GM vaginal cream; Insert 1 g into the vagina two times a week.  Dispense: 42.5 g; Refill: 3  - CBC WITH DIFFERENTIAL; Future    3. Acquired hypothyroidism    - TSH WITH REFLEX TO FT4; Future    4. Dyslipidemia    - Lipid Profile; Future    5. Vitamin D insufficiency      6. Vitamin D deficiency    - VITAMIN D,25 HYDROXY (DEFICIENCY); Future    7. Screening for diabetes mellitus    - Comp Metabolic Panel; Future  - HEMOGLOBIN A1C; Future    8. Encounter for screening mammogram for malignant neoplasm of breast    - MA-SCREENING MAMMO BILAT W/TOMOSYNTHESIS W/CAD; Future      Will see her back in about 4 weeks so we can discuss medication adjustments and where to go from here.      Return in about 4 weeks (around 8/22/2025) for follow up meds.    Please note that this dictation was created using voice recognition software. I have made every reasonable attempt to correct obvious errors, but I expect that there are errors of grammar and possibly content that I did not discover before finalizing the note.             [1]   Current Outpatient Medications Ordered in Epic   Medication Sig Dispense Refill    estradiol (VIVELLE DOT) 0.05 MG/24HR PATCH BIWEEKLY Place 1 Patch on the skin two times a week. 24 Patch 1    progesterone (PROMETRIUM) 200 MG capsule Take 1 Capsule by mouth every evening for 90 days. 90 Capsule 2    estradiol (ESTRACE) 0.1 MG/GM vaginal cream Insert 1 g into the vagina two times a week. 42.5 g 3    albuterol 108 (90 Base) MCG/ACT Aero Soln inhalation aerosol Inhale 2 Puffs every 6 hours as needed for Shortness of Breath. 8.5 g 0    hydrOXYzine HCl (ATARAX) 25 MG Tab Take 1 Tablet by mouth 3 times a day as needed for Anxiety. 30 Tablet 0    fluoxetine (PROZAC) 40 MG capsule TAKE 1 CAPSULE BY MOUTH EVERY DAY 90 Capsule 2    buPROPion (WELLBUTRIN XL) 300 MG XL tablet Take 1  Tablet by mouth every morning. 90 Tablet 3    levothyroxine (SYNTHROID) 50 MCG Tab Take 1 Tablet by mouth every morning on an empty stomach. 90 Tablet 3    Mirabegron ER (MYRBETRIQ) 25 MG TABLET SR 24 HR Myrbetriq 25 mg tablet,extended release   TAKE 1 TABLET BY MOUTH EVERY DAY       No current [facility]-ordered facility-administered medications on file.

## 2025-08-08 ENCOUNTER — HOSPITAL ENCOUNTER (OUTPATIENT)
Facility: MEDICAL CENTER | Age: 55
End: 2025-08-08
Attending: FAMILY MEDICINE
Payer: COMMERCIAL

## 2025-08-08 VITALS — HEIGHT: 65 IN | BODY MASS INDEX: 31.32 KG/M2 | WEIGHT: 188 LBS

## 2025-08-08 DIAGNOSIS — Z12.31 ENCOUNTER FOR SCREENING MAMMOGRAM FOR MALIGNANT NEOPLASM OF BREAST: ICD-10-CM

## 2025-08-08 PROCEDURE — 77067 SCR MAMMO BI INCL CAD: CPT

## 2025-08-08 ASSESSMENT — FIBROSIS 4 INDEX: FIB4 SCORE: 1.354838709677419355

## 2025-08-14 ENCOUNTER — RESULTS FOLLOW-UP (OUTPATIENT)
Dept: MEDICAL GROUP | Facility: LAB | Age: 55
End: 2025-08-14
Payer: COMMERCIAL

## 2025-08-18 ENCOUNTER — HOSPITAL ENCOUNTER (OUTPATIENT)
Dept: LAB | Facility: MEDICAL CENTER | Age: 55
End: 2025-08-18
Attending: FAMILY MEDICINE
Payer: COMMERCIAL

## 2025-08-18 DIAGNOSIS — E55.9 VITAMIN D DEFICIENCY: ICD-10-CM

## 2025-08-18 DIAGNOSIS — Z13.1 SCREENING FOR DIABETES MELLITUS: ICD-10-CM

## 2025-08-18 DIAGNOSIS — E78.5 DYSLIPIDEMIA: ICD-10-CM

## 2025-08-18 DIAGNOSIS — E03.9 ACQUIRED HYPOTHYROIDISM: ICD-10-CM

## 2025-08-18 DIAGNOSIS — N30.10 INTERSTITIAL CYSTITIS: ICD-10-CM

## 2025-08-18 LAB
25(OH)D3 SERPL-MCNC: 31 NG/ML (ref 30–100)
ALBUMIN SERPL BCP-MCNC: 4 G/DL (ref 3.2–4.9)
ALBUMIN/GLOB SERPL: 1.7 G/DL
ALP SERPL-CCNC: 96 U/L (ref 30–99)
ALT SERPL-CCNC: 15 U/L (ref 2–50)
ANION GAP SERPL CALC-SCNC: 9 MMOL/L (ref 7–16)
AST SERPL-CCNC: 26 U/L (ref 12–45)
BASOPHILS # BLD AUTO: 0.4 % (ref 0–1.8)
BASOPHILS # BLD: 0.02 K/UL (ref 0–0.12)
BILIRUB SERPL-MCNC: 0.5 MG/DL (ref 0.1–1.5)
BUN SERPL-MCNC: 13 MG/DL (ref 8–22)
CALCIUM ALBUM COR SERPL-MCNC: 8.7 MG/DL (ref 8.5–10.5)
CALCIUM SERPL-MCNC: 8.7 MG/DL (ref 8.5–10.5)
CHLORIDE SERPL-SCNC: 107 MMOL/L (ref 96–112)
CHOLEST SERPL-MCNC: 174 MG/DL (ref 100–199)
CO2 SERPL-SCNC: 25 MMOL/L (ref 20–33)
CREAT SERPL-MCNC: 0.74 MG/DL (ref 0.5–1.4)
EOSINOPHIL # BLD AUTO: 0 K/UL (ref 0–0.51)
EOSINOPHIL NFR BLD: 0 % (ref 0–6.9)
ERYTHROCYTE [DISTWIDTH] IN BLOOD BY AUTOMATED COUNT: 43.4 FL (ref 35.9–50)
EST. AVERAGE GLUCOSE BLD GHB EST-MCNC: 111 MG/DL
GFR SERPLBLD CREATININE-BSD FMLA CKD-EPI: 94 ML/MIN/1.73 M 2
GLOBULIN SER CALC-MCNC: 2.3 G/DL (ref 1.9–3.5)
GLUCOSE SERPL-MCNC: 85 MG/DL (ref 65–99)
HBA1C MFR BLD: 5.5 % (ref 4–5.6)
HCT VFR BLD AUTO: 41.7 % (ref 37–47)
HDLC SERPL-MCNC: 52 MG/DL
HGB BLD-MCNC: 13.5 G/DL (ref 12–16)
IMM GRANULOCYTES # BLD AUTO: 0.02 K/UL (ref 0–0.11)
IMM GRANULOCYTES NFR BLD AUTO: 0.4 % (ref 0–0.9)
LDLC SERPL CALC-MCNC: 103 MG/DL
LYMPHOCYTES # BLD AUTO: 1.51 K/UL (ref 1–4.8)
LYMPHOCYTES NFR BLD: 30.4 % (ref 22–41)
MCH RBC QN AUTO: 26.9 PG (ref 27–33)
MCHC RBC AUTO-ENTMCNC: 32.4 G/DL (ref 32.2–35.5)
MCV RBC AUTO: 83.2 FL (ref 81.4–97.8)
MONOCYTES # BLD AUTO: 0.74 K/UL (ref 0–0.85)
MONOCYTES NFR BLD AUTO: 14.9 % (ref 0–13.4)
NEUTROPHILS # BLD AUTO: 2.67 K/UL (ref 1.82–7.42)
NEUTROPHILS NFR BLD: 53.9 % (ref 44–72)
NRBC # BLD AUTO: 0 K/UL
NRBC BLD-RTO: 0 /100 WBC (ref 0–0.2)
PLATELET # BLD AUTO: 204 K/UL (ref 164–446)
PMV BLD AUTO: 9.5 FL (ref 9–12.9)
POTASSIUM SERPL-SCNC: 4 MMOL/L (ref 3.6–5.5)
PROT SERPL-MCNC: 6.3 G/DL (ref 6–8.2)
RBC # BLD AUTO: 5.01 M/UL (ref 4.2–5.4)
SODIUM SERPL-SCNC: 141 MMOL/L (ref 135–145)
TRIGL SERPL-MCNC: 95 MG/DL (ref 0–149)
TSH SERPL DL<=0.005 MIU/L-ACNC: 1.22 UIU/ML (ref 0.38–5.33)
WBC # BLD AUTO: 5 K/UL (ref 4.8–10.8)

## 2025-08-18 PROCEDURE — 84443 ASSAY THYROID STIM HORMONE: CPT

## 2025-08-18 PROCEDURE — 36415 COLL VENOUS BLD VENIPUNCTURE: CPT

## 2025-08-18 PROCEDURE — 80053 COMPREHEN METABOLIC PANEL: CPT

## 2025-08-18 PROCEDURE — 80061 LIPID PANEL: CPT

## 2025-08-18 PROCEDURE — 83036 HEMOGLOBIN GLYCOSYLATED A1C: CPT

## 2025-08-18 PROCEDURE — 85025 COMPLETE CBC W/AUTO DIFF WBC: CPT

## 2025-08-18 PROCEDURE — 82306 VITAMIN D 25 HYDROXY: CPT

## 2025-08-19 DIAGNOSIS — F33.1 MODERATE EPISODE OF RECURRENT MAJOR DEPRESSIVE DISORDER (HCC): ICD-10-CM

## 2025-08-19 DIAGNOSIS — E03.9 ACQUIRED HYPOTHYROIDISM: ICD-10-CM

## 2025-08-19 RX ORDER — BUPROPION HYDROCHLORIDE 300 MG/1
300 TABLET ORAL EVERY MORNING
Qty: 90 TABLET | Refills: 3 | Status: SHIPPED | OUTPATIENT
Start: 2025-08-19

## 2025-08-19 RX ORDER — FLUOXETINE HYDROCHLORIDE 40 MG/1
40 CAPSULE ORAL
Qty: 90 CAPSULE | Refills: 2 | Status: SHIPPED | OUTPATIENT
Start: 2025-08-19

## 2025-08-19 RX ORDER — LEVOTHYROXINE SODIUM 50 UG/1
50 TABLET ORAL
Qty: 90 TABLET | Refills: 3 | Status: SHIPPED | OUTPATIENT
Start: 2025-08-19

## 2025-08-26 ENCOUNTER — OFFICE VISIT (OUTPATIENT)
Dept: MEDICAL GROUP | Facility: LAB | Age: 55
End: 2025-08-26
Payer: COMMERCIAL

## 2025-08-26 VITALS
WEIGHT: 195 LBS | HEIGHT: 65 IN | HEART RATE: 80 BPM | RESPIRATION RATE: 14 BRPM | SYSTOLIC BLOOD PRESSURE: 116 MMHG | TEMPERATURE: 97.9 F | DIASTOLIC BLOOD PRESSURE: 64 MMHG | BODY MASS INDEX: 32.49 KG/M2 | OXYGEN SATURATION: 99 %

## 2025-08-26 DIAGNOSIS — F41.9 ANXIETY: Primary | ICD-10-CM

## 2025-08-26 DIAGNOSIS — Z78.0 MENOPAUSE: ICD-10-CM

## 2025-08-26 PROCEDURE — 3074F SYST BP LT 130 MM HG: CPT | Performed by: FAMILY MEDICINE

## 2025-08-26 PROCEDURE — 99213 OFFICE O/P EST LOW 20 MIN: CPT | Performed by: FAMILY MEDICINE

## 2025-08-26 PROCEDURE — 3078F DIAST BP <80 MM HG: CPT | Performed by: FAMILY MEDICINE

## 2025-08-26 ASSESSMENT — FIBROSIS 4 INDEX: FIB4 SCORE: 1.84

## (undated) DEVICE — GLOVE BIOGEL SZ 7 SURGICAL PF LTX - (50PR/BX 4BX/CA)

## (undated) DEVICE — CANISTER SUCTION 3000ML MECHANICAL FILTER AUTO SHUTOFF MEDI-VAC NONSTERILE LF DISP  (40EA/CA)

## (undated) DEVICE — SYRINGE 10 ML CONTROL LL (25EA/BX 4BX/CA)

## (undated) DEVICE — TRAY SRGPRP PVP IOD WT PRP - (20/CA)

## (undated) DEVICE — TROCAR LAPSCP 100MM 12MM NTHRD - (6/BX)

## (undated) DEVICE — ARMREST CRADLE FOAM - (2PR/PK 12PR/CA)

## (undated) DEVICE — KIT  I.V. START (100EA/CA)

## (undated) DEVICE — CHLORAPREP 26 ML APPLICATOR - ORANGE TINT(25/CA)

## (undated) DEVICE — ELECTRODE MONOPOLAR ANGLED CUTTING LOOP DIAM 0.35 YELLOW 24FR (6EA/PK)

## (undated) DEVICE — SUTURE 2-0 VICRYL PLUS CT-1 36 (36PK/BX)"

## (undated) DEVICE — MASK ANESTHESIA ADULT  - (100/CA)

## (undated) DEVICE — SODIUM CHL. IRRIGATION 0.9% 3000ML (4EA/CA 65CA/PF)

## (undated) DEVICE — SODIUM CHL IRRIGATION 0.9% 1000ML (12EA/CA)

## (undated) DEVICE — GLOVE BIOGEL SZ 6.5 SURGICAL PF LTX (50PR/BX 4BX/CA)

## (undated) DEVICE — SET IRRIGATION CYSTOSCOPY TUBE L80 IN (20EA/CA)

## (undated) DEVICE — GLOVE BIOGEL INDICATOR SZ 7SURGICAL PF LTX - (50/BX 4BX/CA)

## (undated) DEVICE — HEAD HOLDER JUNIOR/ADULT

## (undated) DEVICE — DERMABOND ADVANCED - (12EA/BX)

## (undated) DEVICE — CANISTER SUCTION RIGID RED 1500CC (40EA/CA)

## (undated) DEVICE — PROTECTOR ULNA NERVE - (36PR/CA)

## (undated) DEVICE — PACK LAPAROSCOPY - (1/CA)

## (undated) DEVICE — PAD SANITARY 11IN MAXI IND WRAPPED  (12EA/PK 24PK/CA)

## (undated) DEVICE — STERI STRIP COMPOUND BENZOIN - TINCTURE 0.6ML WITH APPLICATOR (40EA/BX)

## (undated) DEVICE — SUTURE GENERAL

## (undated) DEVICE — DRESSING TRANSPARENT FILM TEGADERM 2.375 X 2.75"  (100EA/BX)"

## (undated) DEVICE — TUBING SETDISPOS HIGH FLOW II - (10/BX)

## (undated) DEVICE — DRAPESURG STERI-DRAPE LONG - (10/BX 4BX/CA)

## (undated) DEVICE — SET SUCTION/IRRIGATION WITH DISPOSABLE TIP (6/CA )PART #0250-070-520 IS A SUB

## (undated) DEVICE — SUTURE 4-0 MONOCRYL PLUS PS-2 - 27 INCH (36/BX)

## (undated) DEVICE — DEVICE TISSUE REMOVAL LITE MYOSURE

## (undated) DEVICE — UTERINE MANIP V-CARE LARGE - (DAVINCI)  8/CA

## (undated) DEVICE — DRAPE VAGINAL BIB W/ POUCH (10EA/CA)

## (undated) DEVICE — TUBE CONNECTING SUCTION - CLEAR PLASTIC STERILE 72 IN (50EA/CA)

## (undated) DEVICE — DRESSING TRANSPARENT FILM TEGADERM 4 X 4.75" (50EA/BX)"

## (undated) DEVICE — KIT ANESTHESIA W/CIRCUIT & 3/LT BAG W/FILTER (20EA/CA)

## (undated) DEVICE — SET EXTENSION WITH 2 PORTS (48EA/CA) ***PART #2C8610 IS A SUBSTITUTE*****

## (undated) DEVICE — TUBE E-T HI-LO CUFF 6.5MM (10EA/BX)

## (undated) DEVICE — Device

## (undated) DEVICE — GOWN WARMING STANDARD FLEX - (30/CA)

## (undated) DEVICE — SOLUTION SORBITOL 3000ML (4/CA)

## (undated) DEVICE — MANIFOLD NEPTUNE 1 PORT (20/PK)

## (undated) DEVICE — GOWN SURGEONS X-LARGE - DISP. (30/CA)

## (undated) DEVICE — GLOVE BIOGEL PI INDICATOR SZ 7.5 SURGICAL PF LF -(50/BX 4BX/CA)

## (undated) DEVICE — UTERINE MANIP RUMI 6.7X6 - (5/BX)

## (undated) DEVICE — ELECTRODE 850 FOAM ADHESIVE - HYDROGEL RADIOTRNSPRNT (50/PK)

## (undated) DEVICE — SUTURE 0 VICRYL PLUS CT-2 - 8 X 18 INCH (12/BX)

## (undated) DEVICE — SUTURE 0 VICRYL PLUS CT-2 - 27 INCH (36/BX)

## (undated) DEVICE — SPONGE GAUZESTER. 2X2 4-PL - (2/PK 50PK/BX 30BX/CS)

## (undated) DEVICE — SENSOR SPO2 NEO LNCS ADHESIVE (20/BX) SEE USER NOTES

## (undated) DEVICE — SET LEADWIRE 5 LEAD BEDSIDE DISPOSABLE ECG (1SET OF 5/EA)

## (undated) DEVICE — TROCAR 5X100 BLADED ADVANCE - FIXATION (6/BX)

## (undated) DEVICE — ENDO PEANUT 5MM DEVICE (12EA/BX)

## (undated) DEVICE — TRAY FOLEY CATHETER STATLOCK 16FR SURESTEP  (10EA/CA)

## (undated) DEVICE — SUTURE 0 VICRYL PLUS CT-1 - 36 INCH (36/BX)

## (undated) DEVICE — CATHETER IV 20 GA X 1-1/4 ---SURG.& SDS ONLY--- (50EA/BX)

## (undated) DEVICE — BLADE SURGICAL CLIPPER - (50EA/CA)

## (undated) DEVICE — HEMOSTAT ARISTA PWD 5 GRAM - (5/BX)

## (undated) DEVICE — CLOSURE SKIN STRIP 1/2 X 4 IN - (STERI STRIP) (50/BX 4BX/CA)

## (undated) DEVICE — TUBING CLEARLINK DUO-VENT - C-FLO (48EA/CA)

## (undated) DEVICE — SLEEVE, VASO, THIGH, MED

## (undated) DEVICE — LACTATED RINGERS INJ 1000 ML - (14EA/CA 60CA/PF)

## (undated) DEVICE — LIGASURE 5MM BLUNT TIP LONG - 44CM (6EA/PK)

## (undated) DEVICE — SUCTION INSTRUMENT YANKAUER BULBOUS TIP W/O VENT (50EA/CA)

## (undated) DEVICE — ELECTRODE DUAL RETURN W/ CORD - (50/PK)

## (undated) DEVICE — SUTURE 4-0 VICRYL PLUS FS-2 - 27 INCH (36/BX)

## (undated) DEVICE — TUBING OUTFLOW HYSTEROSCPY (10EA/BX)

## (undated) DEVICE — DRAPE UNDER BUTTOCKS FLUID - (20/CA)

## (undated) DEVICE — LIGASURE LAPAROSCOPIC 5MM - (6EA/CA)

## (undated) DEVICE — PAD BABY LAP 4X18 W/O - RINGS PREWASHED 5/PK 40PK/CS

## (undated) DEVICE — CANNULA W/SEAL 5X100 Z-THRE - ADED KII (12/BX)

## (undated) DEVICE — SPECIMEN PLASTIC CONVERTOR - (10/CA)

## (undated) DEVICE — WATER IRRIGATION STERILE 1000ML (12EA/CA)

## (undated) DEVICE — TUBING INFLOW HYSTEROSCOPY (10EA/CA)

## (undated) DEVICE — DRESSING NON-ADHERING 8 X 3 - (50/BX)

## (undated) DEVICE — PACK MINOR BASIN - (2EA/CA)

## (undated) DEVICE — GLOVE SZ 6.5 BIOGEL PI MICRO - PF LF (50PR/BX)